# Patient Record
Sex: FEMALE | Race: WHITE | NOT HISPANIC OR LATINO | Employment: OTHER | URBAN - METROPOLITAN AREA
[De-identification: names, ages, dates, MRNs, and addresses within clinical notes are randomized per-mention and may not be internally consistent; named-entity substitution may affect disease eponyms.]

---

## 2017-01-18 ENCOUNTER — ALLSCRIPTS OFFICE VISIT (OUTPATIENT)
Dept: OTHER | Facility: OTHER | Age: 52
End: 2017-01-18

## 2017-04-19 ENCOUNTER — ALLSCRIPTS OFFICE VISIT (OUTPATIENT)
Dept: OTHER | Facility: OTHER | Age: 52
End: 2017-04-19

## 2017-07-21 ENCOUNTER — ALLSCRIPTS OFFICE VISIT (OUTPATIENT)
Dept: OTHER | Facility: OTHER | Age: 52
End: 2017-07-21

## 2017-10-23 ENCOUNTER — GENERIC CONVERSION - ENCOUNTER (OUTPATIENT)
Dept: OTHER | Facility: OTHER | Age: 52
End: 2017-10-23
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
## 2018-01-13 VITALS — DIASTOLIC BLOOD PRESSURE: 85 MMHG | TEMPERATURE: 98.3 F | SYSTOLIC BLOOD PRESSURE: 125 MMHG

## 2018-01-13 VITALS — SYSTOLIC BLOOD PRESSURE: 122 MMHG | TEMPERATURE: 98.1 F | DIASTOLIC BLOOD PRESSURE: 82 MMHG

## 2018-01-15 VITALS — DIASTOLIC BLOOD PRESSURE: 88 MMHG | SYSTOLIC BLOOD PRESSURE: 140 MMHG | TEMPERATURE: 97.8 F

## 2018-01-15 NOTE — MISCELLANEOUS
Provider Comments  Provider Comments:   Patient phone numbers on file have been disconnected, patient missed follow up appointment  I will send out a Missed Appointment warning letter to patients home    Hailey Concepcion [facility]      Signatures   Electronically signed by : Kathryn Carrera [facility]; Jul 19 2016  6:14PM EST                       (Author)    Electronically signed by : Vinod Pleitez MD; Jul 20 2016  5:59AM EST                       (Author)

## 2018-01-22 VITALS — TEMPERATURE: 97.9 F | SYSTOLIC BLOOD PRESSURE: 118 MMHG | DIASTOLIC BLOOD PRESSURE: 84 MMHG

## 2018-01-24 DIAGNOSIS — G43.709 CHRONIC MIGRAINE WITHOUT AURA WITHOUT STATUS MIGRAINOSUS, NOT INTRACTABLE: Primary | ICD-10-CM

## 2018-01-24 RX ORDER — GABAPENTIN 300 MG/1
CAPSULE ORAL
Qty: 540 CAPSULE | Refills: 0 | Status: SHIPPED | OUTPATIENT
Start: 2018-01-24 | End: 2018-03-03 | Stop reason: SDUPTHER

## 2018-01-26 ENCOUNTER — PROCEDURE VISIT (OUTPATIENT)
Dept: NEUROLOGY | Facility: CLINIC | Age: 53
End: 2018-01-26
Payer: COMMERCIAL

## 2018-01-26 VITALS — HEART RATE: 90 BPM | DIASTOLIC BLOOD PRESSURE: 85 MMHG | SYSTOLIC BLOOD PRESSURE: 135 MMHG | TEMPERATURE: 97.8 F

## 2018-01-26 DIAGNOSIS — G43.709 CHRONIC MIGRAINE WITHOUT AURA WITHOUT STATUS MIGRAINOSUS, NOT INTRACTABLE: Primary | ICD-10-CM

## 2018-01-26 PROCEDURE — 64615 CHEMODENERV MUSC MIGRAINE: CPT | Performed by: PSYCHIATRY & NEUROLOGY

## 2018-01-26 NOTE — PROGRESS NOTES
Chemodenervation  Date/Time: 1/26/2018 8:45 AM  Performed by: Flip Mitchell by: Armando Vanegas details:     Prepped With: Alcohol    Anesthesia (see MAR for exact dosages): Anesthesia method:  None  Procedure details:     Positon:  Upright  Botox:     Botox Type:  Type A    Brand:  Botox    Final Concentration per CC:  200 units    Needle Gauge:  30 G 2 5 inch    Medication Administration:  100 Units onabotulinumtoxin A 100 units  Procedures:     Botox Procedures: chronic headache      Indications: migraines    Injection Location:     Head / Face:  L superior cervical paraspinal, R superior cervical paraspinal, L , R , L frontalis, R frontalis, L medial occipitalis, R medial occipitalis, procerus, R temporalis, L temporalis, R superior trapezius, L superior trapezius, R orbicularis oculi and L orbicularis oculi    L  injection amount:  5    R  injection amount:  5    L lateral frontalis:  5    R lateral frontalis:  5    L medial frontalis:  5    R medial frontalis:  5    L temporalis injection amount:  20    R temporalis injection amount:  20    Procerus injection amount:  5    L orbicularis oculi injection amount:  5    R orbicularis oculi injection amount:  5    L medial occipitalis injection amount:  15    R medial occipitalis injection amount:  15    L superior cervical paraspinal injection amount:  10    R superior cervical paraspinal injection amount:  10    L superior trapezius injection amount:  15    R superior trapezius injection amount:  15  Total Units:     Total units used:  200, was medically necessary    Total units discarded:  0  Post-procedure details:     Chemodenervation:  Chronic migraine    Patient tolerance of procedure: Tolerated well, no immediate complications  Comments:      35 was given on the left temporal, parietal region

## 2018-03-03 DIAGNOSIS — G43.709 CHRONIC MIGRAINE WITHOUT AURA WITHOUT STATUS MIGRAINOSUS, NOT INTRACTABLE: ICD-10-CM

## 2018-03-05 RX ORDER — GABAPENTIN 300 MG/1
CAPSULE ORAL
Qty: 540 CAPSULE | Refills: 0 | Status: SHIPPED | OUTPATIENT
Start: 2018-03-05 | End: 2018-07-18 | Stop reason: SDUPTHER

## 2018-04-24 ENCOUNTER — TELEPHONE (OUTPATIENT)
Dept: NEUROLOGY | Facility: CLINIC | Age: 53
End: 2018-04-24

## 2018-04-25 NOTE — TELEPHONE ENCOUNTER
Per America, pts 200 units of Botox is going to arrive in [facility]  This Fri the 27th via [employer], Can you please await shipment and document when it arrives and please let me know when it comes to the office  Thank you so much!   Justin

## 2018-04-27 ENCOUNTER — TELEPHONE (OUTPATIENT)
Dept: NEUROLOGY | Facility: CLINIC | Age: 53
End: 2018-04-27

## 2018-04-27 NOTE — TELEPHONE ENCOUNTER
Juan,  Pt has new insur  And did not tell us    I found out from [place], pts Botox was supposed to be delivered today    Pts new insur  Is just reg  [place] now  ID number [id number]     Phone number   [facility] pt we have to cancel her binj apt with Q40 for this coming Monday  I told her we would work on this ASAP as she is suffering from worsening H/a's   Please advise ASAP    Thanks  Justin

## 2018-04-30 DIAGNOSIS — G43.709 CHRONIC MIGRAINE WITHOUT AURA WITHOUT STATUS MIGRAINOSUS, NOT INTRACTABLE: Primary | ICD-10-CM

## 2018-04-30 RX ORDER — DEXAMETHASONE 2 MG/1
TABLET ORAL
Qty: 3 TABLET | Refills: 0 | Status: SHIPPED | OUTPATIENT
Start: 2018-04-30 | End: 2018-09-21

## 2018-04-30 NOTE — TELEPHONE ENCOUNTER
She may have decadron 2mg in am for 3 days and increase her gabapentin to 2 tabs tid to see if that works better for her   thx

## 2018-04-30 NOTE — TELEPHONE ENCOUNTER
Pt seems very unsure of what she has taken in the past & what has helped    Pt states she has tried fioricet in the past w/minimal relief  Pt thinks she has tried steroids     Per med rec, pt has tried sumatriptan, midrin, decadron, toradol ink (she did not like the inj), & reglan    Pt does not want codeine, she OD'd in the past    Pt has only been taking gabapentin 300mg, 2 tabs BID, she will increase that as directed per last rx now    Please advise

## 2018-05-01 ENCOUNTER — TELEPHONE (OUTPATIENT)
Dept: NEUROLOGY | Facility: CLINIC | Age: 53
End: 2018-05-01

## 2018-05-02 ENCOUNTER — DOCUMENTATION (OUTPATIENT)
Dept: NEUROLOGY | Facility: CLINIC | Age: 53
End: 2018-05-02

## 2018-05-04 ENCOUNTER — TELEPHONE (OUTPATIENT)
Dept: NEUROLOGY | Facility: CLINIC | Age: 53
End: 2018-05-04

## 2018-05-04 ENCOUNTER — TRANSCRIBE ORDERS (OUTPATIENT)
Dept: NEUROLOGY | Facility: CLINIC | Age: 53
End: 2018-05-04

## 2018-05-04 DIAGNOSIS — IMO0002 CHRONIC MIGRAINE: Primary | ICD-10-CM

## 2018-05-04 RX ORDER — DULOXETIN HYDROCHLORIDE 60 MG/1
60 CAPSULE, DELAYED RELEASE ORAL DAILY
Refills: 0 | COMMUNITY
Start: 2018-04-03 | End: 2020-12-14

## 2018-05-04 RX ORDER — AMLODIPINE BESYLATE 5 MG/1
TABLET ORAL
Refills: 3 | COMMUNITY
Start: 2018-04-03 | End: 2019-04-24 | Stop reason: ALTCHOICE

## 2018-05-04 RX ORDER — FLUTICASONE FUROATE AND VILANTEROL TRIFENATATE 100; 25 UG/1; UG/1
POWDER RESPIRATORY (INHALATION)
Refills: 5 | Status: ON HOLD | COMMUNITY
Start: 2018-02-09 | End: 2022-01-28 | Stop reason: ALTCHOICE

## 2018-05-04 RX ORDER — ONABOTULINUMTOXINA 200 [USP'U]/1
INJECTION, POWDER, LYOPHILIZED, FOR SOLUTION INTRADERMAL; INTRAMUSCULAR
COMMUNITY
Start: 2018-05-01 | End: 2019-04-24 | Stop reason: ALTCHOICE

## 2018-05-04 NOTE — PROGRESS NOTES
Chemodenervation  Date/Time: 5/7/2018 2:43 PM  Performed by: Jazmine Gandhi  Authorized by: Mariya Pringle details:     Prepped With: Alcohol    Anesthesia (see MAR for exact dosages): Anesthesia method:  None  Procedure details:     Position:  Upright  Botox:     Botox Type:  Type A    Brand:  Botox    mL's of Botulinum Toxin:  200    Final Concentration per CC:  200 units    Needle Gauge:  30 G 2 5 inch  Procedures:     Botox Procedures: chronic headache      Indications: migraines    Injection Location:     Head / Face:  L superior cervical paraspinal, R superior cervical paraspinal, L , R , L frontalis, R frontalis, L medial occipitalis, R medial occipitalis, procerus, R temporalis, L temporalis, R superior trapezius and L superior trapezius    L  injection amount:  5 unit(s)    R  injection amount:  5 unit(s)    L lateral frontalis:  5 unit(s)    R lateral frontalis:  5 unit(s)    L medial frontalis:  5 unit(s)    R medial frontalis:  5 unit(s)    L temporalis injection amount:  20 unit(s)    R temporalis injection amount:  20 unit(s)    Procerus injection amount:  5 unit(s)    L medial occipitalis injection amount:  15 unit(s)    R medial occipitalis injection amount:  15 unit(s)    L superior cervical paraspinal injection amount:  10 unit(s)    R superior cervical paraspinal injection amount:  10 unit(s)    L superior trapezius injection amount:  15 unit(s)    R superior trapezius injection amount:  15 unit(s)  Total Units:     Total units used:  200    Total units discarded:  0  Post-procedure details:     Chemodenervation:  Chronic migraine    Patient tolerance of procedure:   Tolerated well, no immediate complications  Comments:      45 units given on the left temporal, parietal region, all medically necessary

## 2018-05-07 ENCOUNTER — PROCEDURE VISIT (OUTPATIENT)
Dept: NEUROLOGY | Facility: CLINIC | Age: 53
End: 2018-05-07
Payer: COMMERCIAL

## 2018-05-07 VITALS — DIASTOLIC BLOOD PRESSURE: 82 MMHG | HEART RATE: 99 BPM | SYSTOLIC BLOOD PRESSURE: 130 MMHG | TEMPERATURE: 98.4 F

## 2018-05-07 DIAGNOSIS — IMO0002 CHRONIC MIGRAINE: ICD-10-CM

## 2018-05-07 DIAGNOSIS — G43.709 CHRONIC MIGRAINE WITHOUT AURA WITHOUT STATUS MIGRAINOSUS, NOT INTRACTABLE: Primary | ICD-10-CM

## 2018-05-07 PROCEDURE — 64615 CHEMODENERV MUSC MIGRAINE: CPT | Performed by: PHYSICIAN ASSISTANT

## 2018-06-25 ENCOUNTER — TELEPHONE (OUTPATIENT)
Dept: NEUROLOGY | Facility: CLINIC | Age: 53
End: 2018-06-25

## 2018-06-25 DIAGNOSIS — G43.709 CHRONIC MIGRAINE WITHOUT AURA WITHOUT STATUS MIGRAINOSUS, NOT INTRACTABLE: Primary | ICD-10-CM

## 2018-06-25 RX ORDER — DEXAMETHASONE 2 MG/1
2 TABLET ORAL
Qty: 5 TABLET | Refills: 0 | Status: SHIPPED | OUTPATIENT
Start: 2018-06-25 | End: 2018-09-21 | Stop reason: SDUPTHER

## 2018-06-25 NOTE — TELEPHONE ENCOUNTER
pt called and states today is day 5 of a migriane   +nausea, +light/sound and smell sensitivity  tried tylenol and ineffective     gabapentin 300mg 2 caps tid  unsure what she has taken in the past that has worked  will not take depakote because of weight gain    she does not remember if she took decadron in the past     [phone]

## 2018-07-11 ENCOUNTER — TELEPHONE (OUTPATIENT)
Dept: NEUROLOGY | Facility: CLINIC | Age: 53
End: 2018-07-11

## 2018-07-11 NOTE — TELEPHONE ENCOUNTER
Called patient because [place] is no longer active  Patient stated she now has Medicare and Access (yellow card) I requested patient bring copies of cards to the office  Patient agreeable to go to our [facility] office since she lives in [place]

## 2018-07-17 NOTE — TELEPHONE ENCOUNTER
Delia Tierney  [address] [place] [address],     Patient did no show up at the [facility] with new insurance cards  [place] is still scanned into the patient's chart from May

## 2018-07-18 DIAGNOSIS — G43.709 CHRONIC MIGRAINE WITHOUT AURA WITHOUT STATUS MIGRAINOSUS, NOT INTRACTABLE: ICD-10-CM

## 2018-07-18 RX ORDER — GABAPENTIN 300 MG/1
CAPSULE ORAL
Qty: 540 CAPSULE | Refills: 0 | Status: SHIPPED | OUTPATIENT
Start: 2018-07-18 | End: 2018-10-11 | Stop reason: SDUPTHER

## 2018-07-31 NOTE — TELEPHONE ENCOUNTER
Called and spoke with patient regarding below  Patient stated she is having difficulty getting to office to provide copies  Patient stated she will be able to fax copies tomorrow 8/1/18  Provided patient with fax #  I will await

## 2018-08-06 NOTE — TELEPHONE ENCOUNTER
Called and spoke with patient who stated she was not yet able to fax insurance information  We discussed that we will have to cancel botox inj on 8/9/18  Tried to r/s patient to next available which is 8/30/18, patient was upset stating that she is suffering and does not want to wait 3 weeks  I explained to patient that I called her multiple times to get insurance information and she has not followed through with the requests  Patient requesting to be seen at [facility] [place]  I will reach out to [address] at [facility] office to schedule patient once we receive patients information

## 2018-08-24 ENCOUNTER — TELEPHONE (OUTPATIENT)
Dept: NEUROLOGY | Facility: CLINIC | Age: 53
End: 2018-08-24

## 2018-08-24 DIAGNOSIS — G43.709 CHRONIC MIGRAINE WITHOUT AURA WITHOUT STATUS MIGRAINOSUS, NOT INTRACTABLE: Primary | ICD-10-CM

## 2018-08-24 DIAGNOSIS — G43.709 CHRONIC MIGRAINE WITHOUT AURA WITHOUT STATUS MIGRAINOSUS, NOT INTRACTABLE: ICD-10-CM

## 2018-08-24 RX ORDER — DIVALPROEX SODIUM 250 MG/1
TABLET, DELAYED RELEASE ORAL
Qty: 13 TABLET | Refills: 0 | Status: SHIPPED | OUTPATIENT
Start: 2018-08-24 | End: 2019-04-24 | Stop reason: ALTCHOICE

## 2018-08-24 NOTE — TELEPHONE ENCOUNTER
Patient made aware that rx sent to pharm for depakote  She stated she doesn't want to take it due to the weight gain, but informed her that it's only for 5 days and not long term, patient agreeable  Patient also advised to sched f/u at [place] office  Patient stated she will once we receive her insurance info

## 2018-08-24 NOTE — TELEPHONE ENCOUNTER
----- Message from Aryan Jones sent at 8/22/2018 12:28 PM EDT -----  [facility],    Please reach out to patient  I have attempted to get copies of patients insurance cards in order to get new auth  Patient has been non compliant with this  She also expressed wanting to only go to [facility] since she lives in [place], Dr Rhianna Early is ok with this       Thanks,    Jahaira Amezcua

## 2018-08-24 NOTE — TELEPHONE ENCOUNTER
Per Jessica Mccann at [place] office, Patient is trying to sched a botox appt at [place] office but they are having issues with obtaining her insurance for new authorizations, they will not sched her until they receive her insurance info faxed to [place]

## 2018-08-24 NOTE — TELEPHONE ENCOUNTER
It does not seem that [facility] team has done a follow up with her  She was seen by Dr Maureen Stark in the past   She has no office visit follow up since 2016  She needs to make an appointment with the [facility] team to update what is going on with her  Consider Depakote 250 mg 3 tabs at bedtime for 3 days then 20 50 mg at bedtime for 2 days then stop

## 2018-08-24 NOTE — TELEPHONE ENCOUNTER
When did migraine start?  3 days ago  Location/Description: throbbing pain, unilateral in the left temporal area  Pain scale: 8; pain was 10/10 this morn  Associated symptoms:nausea, vomiting, photophobia, decreased social functioning  Precipitating factors: stress  Alleviating factors: caffeine  What medications have you tried for this migraine headache? gabapentin and OTC aleve 220 mg 2 tabs 4 times yesterday (total of 8 pills)  Advised patient not to take any more OTC aleve    Current migraine medications are confirmed as:  Gabapentin 300 mg 2 caps BID    Medications tried in the past? Decadron "numbs" it

## 2018-08-24 NOTE — TELEPHONE ENCOUNTER
Called and spoke with the patient- advised her that I cannot schedule her Botox appointment until she send over her insurance cards so we can get authorization  Patient states she sent them over today- called spoke with Kaylene Whyte- she didn't see any come over- went to check with Juan but he was away from his desk  I advised patient we have not received them yet  Patient states she will call her friend to make sure they were sent and went through okay  Patient states she is having horrible headaches  She questioned what she can do until she gets her Botox I did advise that I can send her to the nursing team  Patient was hesitant and then stated "well I guess I should because I've been popping gabapentin like its Candy and I don't want to have to call you on Monday to tell you I overdose " Called nursing team, and advised them of the situation  Patient was transferred directly to hospitals

## 2018-08-24 NOTE — TELEPHONE ENCOUNTER
[address] [facility]             Patient called again to schedule Botox in [facility]  She is having insurance cards faxed today or tomorrow  She will be available after 1 pm today 8/24/18  Please call patient ASAP to schedule

## 2018-08-25 DIAGNOSIS — G43.709 CHRONIC MIGRAINE WITHOUT AURA WITHOUT STATUS MIGRAINOSUS, NOT INTRACTABLE: ICD-10-CM

## 2018-08-27 ENCOUNTER — DOCUMENTATION (OUTPATIENT)
Dept: NEUROLOGY | Facility: CLINIC | Age: 53
End: 2018-08-27

## 2018-08-27 RX ORDER — DIVALPROEX SODIUM 250 MG/1
TABLET, DELAYED RELEASE ORAL
Qty: 390 TABLET | Refills: 0 | OUTPATIENT
Start: 2018-08-27

## 2018-08-27 RX ORDER — DIVALPROEX SODIUM 250 MG/1
TABLET, DELAYED RELEASE ORAL
Qty: 13 TABLET | Refills: 0 | OUTPATIENT
Start: 2018-08-27

## 2018-08-27 NOTE — TELEPHONE ENCOUNTER
Insurance cards received per Juan  Called and spoke with the patient made her aware that we received her cards  I offered the patient an appointment on 9/7/18 at 8 am with [address]  Patient states she cannot do Tuesday or Fridays  Due to MK's availability and the upcoming holiday next available with [address] is 9/12/18 at 730 am  Patient accepted that appointment

## 2018-09-12 ENCOUNTER — PROCEDURE VISIT (OUTPATIENT)
Dept: NEUROLOGY | Facility: CLINIC | Age: 53
End: 2018-09-12
Payer: COMMERCIAL

## 2018-09-12 VITALS
RESPIRATION RATE: 16 BRPM | BODY MASS INDEX: 31.83 KG/M2 | HEIGHT: 62 IN | HEART RATE: 92 BPM | SYSTOLIC BLOOD PRESSURE: 136 MMHG | WEIGHT: 173 LBS | DIASTOLIC BLOOD PRESSURE: 80 MMHG | TEMPERATURE: 98.1 F

## 2018-09-12 DIAGNOSIS — G43.709 CHRONIC MIGRAINE WITHOUT AURA WITHOUT STATUS MIGRAINOSUS, NOT INTRACTABLE: Primary | ICD-10-CM

## 2018-09-12 PROCEDURE — 64615 CHEMODENERV MUSC MIGRAINE: CPT | Performed by: PHYSICIAN ASSISTANT

## 2018-09-12 RX ORDER — ALBUTEROL SULFATE 90 UG/1
POWDER, METERED RESPIRATORY (INHALATION)
Refills: 1 | COMMUNITY
Start: 2018-06-14 | End: 2020-05-04 | Stop reason: ALTCHOICE

## 2018-09-12 RX ORDER — AMLODIPINE BESYLATE 10 MG/1
TABLET ORAL
Refills: 3 | COMMUNITY
Start: 2018-06-11 | End: 2019-04-24 | Stop reason: ALTCHOICE

## 2018-09-12 RX ORDER — LIDOCAINE AND PRILOCAINE 25; 25 MG/G; MG/G
CREAM TOPICAL AS NEEDED
Qty: 30 G | Refills: 0 | Status: SHIPPED | OUTPATIENT
Start: 2018-09-12 | End: 2019-04-24 | Stop reason: ALTCHOICE

## 2018-09-12 RX ORDER — SUMATRIPTAN 100 MG/1
1 TABLET, FILM COATED ORAL
COMMUNITY
Start: 2015-12-31 | End: 2020-05-04 | Stop reason: ALTCHOICE

## 2018-09-12 NOTE — PROGRESS NOTES
Chemodenervation  Date/Time: 9/12/2018 7:50 AM  Performed by: Laure Carey  Authorized by: Laure Carey     Pre-procedure details:     Prepped With: Alcohol    Procedure details:     Position:  Upright  Botox:     Botox Type:  Type A    Brand:  Botox    mL's of Botulinum Toxin:  165    Final Concentration per CC:  50 units    Needle Gauge:  30 G 2 5 inch  Procedures:     Botox Procedures: chronic headache      Indications: migraines    Injection Location:     Head / Face:  L , R , L frontalis, R frontalis, R inferior cervical paraspinal, L inferior cervical paraspinal, L medial occipitalis, R medial occipitalis, L lateral occipitalis, R lateral occipitalis, procerus, L temporalis, R temporalis, R superior trapezius and L superior trapezius    L  injection amount:  5 unit(s)    R  injection amount:  5 unit(s)    L lateral frontalis:  5 unit(s)    R lateral frontalis:  5 unit(s)    L medial frontalis:  5 unit(s)    R medial frontalis:  5 unit(s)    L temporalis injection amount:  20 unit(s)    R temporalis injection amount:  20 unit(s)    Procerus injection amount:  5 unit(s)    L lateral occipitalis injection amount:  5 unit(s)    R lateral occipitalis injection amount:  5 unit(s)    L medial occipitalis injection amount:  10 unit(s)    R medial occipitalis injection amount:  10 unit(s)    L inferior cervical paraspinal injection amount:  10 unit(s)    R inferior cervical paraspinal injection amount:  10 unit(s)    L superior trapezius injection amount:  15 unit(s)    R superior trapezius injection amount:  15 unit(s)  Total Units:     Total units used:  165    Total units discarded:  35  Post-procedure details:     Chemodenervation:  Chronic migraine    Facial Nerve Location[de-identified]  Bilateral facial nerve    Patient tolerance of procedure: Tolerated well, no immediate complications  Comments:      10 extra units in the left scalp medically necessary            Vitals: 09/12/18 0746   BP: 136/80   Pulse: 92   Resp: 16   Temp: 98 1 °F (36 7 °C)       Emla cream provided for next visit  The patient's sister recently passed from ruptured aneurysm  The patient had imaging at [facility] and will have this sent over to us  When she gets her license back in November she will make a follow-up to discuss/manage this

## 2018-09-20 ENCOUNTER — TELEPHONE (OUTPATIENT)
Dept: NEUROLOGY | Facility: CLINIC | Age: 53
End: 2018-09-20

## 2018-09-20 DIAGNOSIS — G43.709 CHRONIC MIGRAINE WITHOUT AURA WITHOUT STATUS MIGRAINOSUS, NOT INTRACTABLE: ICD-10-CM

## 2018-09-20 NOTE — TELEPHONE ENCOUNTER
pt called and states that she had botox on 9/12  today is day 3 of a headache   tired of crying herself to sleep and waking up with the same pain  10/10   today is the worst   +light/smell sensitivity  no n/v   took gabapentin this am and not helping   has not taken any other medications  gabapentin 300mg 2 tabs bid  does not want depakote due to weight gain  took decadron in june but does not remember if this was effective  agreeable to decadron if that is what you would like to order      [phone]

## 2018-09-21 ENCOUNTER — TELEPHONE (OUTPATIENT)
Dept: NEUROLOGY | Facility: CLINIC | Age: 53
End: 2018-09-21

## 2018-09-21 RX ORDER — DEXAMETHASONE 2 MG/1
2 TABLET ORAL
Qty: 5 TABLET | Refills: 0 | Status: SHIPPED | OUTPATIENT
Start: 2018-09-21 | End: 2018-12-17 | Stop reason: SDUPTHER

## 2018-09-21 NOTE — TELEPHONE ENCOUNTER
Will send decadron  Last message could have been handled by any provider if this was emergent  Jacquelin's last message was very helpful and detailed

## 2018-10-11 DIAGNOSIS — G43.709 CHRONIC MIGRAINE WITHOUT AURA WITHOUT STATUS MIGRAINOSUS, NOT INTRACTABLE: ICD-10-CM

## 2018-10-11 RX ORDER — GABAPENTIN 300 MG/1
600 CAPSULE ORAL 3 TIMES DAILY
Qty: 540 CAPSULE | Refills: 0 | Status: SHIPPED | OUTPATIENT
Start: 2018-10-11 | End: 2019-04-19 | Stop reason: SDUPTHER

## 2018-11-27 ENCOUNTER — DOCUMENTATION (OUTPATIENT)
Dept: NEUROLOGY | Facility: CLINIC | Age: 53
End: 2018-11-27

## 2018-12-06 ENCOUNTER — TELEPHONE (OUTPATIENT)
Dept: NEUROLOGY | Facility: CLINIC | Age: 53
End: 2018-12-06

## 2018-12-06 NOTE — TELEPHONE ENCOUNTER
Pt states that she still had not receive a call from our office re: botox delivery  Her appt is scheduled 12/13/18  Call transferred to [facility]

## 2018-12-06 NOTE — TELEPHONE ENCOUNTER
Spoke with the patient- made her aware that she is stock Botox  Confirmed Botox appointment with the patient

## 2018-12-13 ENCOUNTER — PROCEDURE VISIT (OUTPATIENT)
Dept: NEUROLOGY | Facility: CLINIC | Age: 53
End: 2018-12-13
Payer: MEDICARE

## 2018-12-13 VITALS — DIASTOLIC BLOOD PRESSURE: 78 MMHG | HEART RATE: 102 BPM | SYSTOLIC BLOOD PRESSURE: 116 MMHG | TEMPERATURE: 98.2 F

## 2018-12-13 DIAGNOSIS — G43.709 CHRONIC MIGRAINE WITHOUT AURA WITHOUT STATUS MIGRAINOSUS, NOT INTRACTABLE: Primary | ICD-10-CM

## 2018-12-13 PROCEDURE — 64615 CHEMODENERV MUSC MIGRAINE: CPT | Performed by: PHYSICIAN ASSISTANT

## 2018-12-13 NOTE — PROGRESS NOTES
Chemodenervation  Date/Time: 12/13/2018 8:23 AM  Performed by: Becki Diop  Authorized by: Becki Diop     Pre-procedure details:     Prepped With: Alcohol    Procedure details:     Position:  Upright  Botox:     Botox Type:  Type A    Brand:  Botox    mL's of Botulinum Toxin:  175    Final Concentration per CC:  50 units    Needle Gauge:  30 G 2 5 inch  Procedures:     Botox Procedures: chronic headache      Indications: migraines    Injection Location:     Head / Face:  L , R , L frontalis, R frontalis, R inferior cervical paraspinal, L inferior cervical paraspinal, L medial occipitalis, R medial occipitalis, L lateral occipitalis, R lateral occipitalis, procerus, L temporalis, R temporalis, R superior trapezius and L superior trapezius    L  injection amount:  5 unit(s)    R  injection amount:  5 unit(s)    L lateral frontalis:  5 unit(s)    R lateral frontalis:  5 unit(s)    L medial frontalis:  5 unit(s)    R medial frontalis:  5 unit(s)    L temporalis injection amount:  20 unit(s)    R temporalis injection amount:  20 unit(s)    Procerus injection amount:  5 unit(s)    L lateral occipitalis injection amount:  5 unit(s)    R lateral occipitalis injection amount:  5 unit(s)    L medial occipitalis injection amount:  10 unit(s)    R medial occipitalis injection amount:  10 unit(s)    L inferior cervical paraspinal injection amount:  10 unit(s)    R inferior cervical paraspinal injection amount:  10 unit(s)    L superior trapezius injection amount:  15 unit(s)    R superior trapezius injection amount:  15 unit(s)  Total Units:     Total units used:  175    Total units discarded:  25  Post-procedure details:     Chemodenervation:  Chronic migraine    Facial Nerve Location[de-identified]  Bilateral facial nerve    Patient tolerance of procedure: Tolerated well, no immediate complications  Comments:      20 extra units in the left scalp, medically necessary            Vitals: 12/13/18 0752   BP: 116/78   Pulse: 102   Temp: 98 2 °F (36 8 °C)

## 2018-12-17 ENCOUNTER — TELEPHONE (OUTPATIENT)
Dept: NEUROLOGY | Facility: CLINIC | Age: 53
End: 2018-12-17

## 2018-12-17 DIAGNOSIS — G43.709 CHRONIC MIGRAINE WITHOUT AURA WITHOUT STATUS MIGRAINOSUS, NOT INTRACTABLE: ICD-10-CM

## 2018-12-17 RX ORDER — DEXAMETHASONE 2 MG/1
TABLET ORAL
Qty: 5 TABLET | Refills: 0 | Status: SHIPPED | OUTPATIENT
Start: 2018-12-17 | End: 2019-01-17 | Stop reason: SDUPTHER

## 2018-12-17 NOTE — TELEPHONE ENCOUNTER
Patient Nils Velasquez called [facility] office  States she had Botox with Marilyn Pack this past Thursday 12/13/18 and has a headache since Friday 12/14/18  Consulted with Marilyn Pack who offered decadron, sumatriptan, or coming into the office for a toradol injection  The patient declined the toradol injection and requested a prescription for "whatever Marilyn Pack recommends" to be sent to [address]

## 2019-01-16 ENCOUNTER — TELEPHONE (OUTPATIENT)
Dept: NEUROLOGY | Facility: CLINIC | Age: 54
End: 2019-01-16

## 2019-01-16 DIAGNOSIS — G43.709 CHRONIC MIGRAINE WITHOUT AURA WITHOUT STATUS MIGRAINOSUS, NOT INTRACTABLE: ICD-10-CM

## 2019-01-16 NOTE — TELEPHONE ENCOUNTER
I called patient in regards to scheduled Botox appt with [address] on 3/14/19  Per Jackie's previous note, patient requested to continue her care with Suni Ruiz and Eric  A botox appt was offered with Eric on 3/18/19 in [place] d/t patient unsure of where the [place] [address] office is located  Patient denied appt because she "doesn't want to prolong her botox"  States any appointments thereafter will be made with Alberto Muñoz  Please advice that patient is awaiting a call back to discuss meds

## 2019-01-16 NOTE — TELEPHONE ENCOUNTER
Pt c/o migraine x7days  N/V yesterday  Photosensitive yesterday  sharp throbbing @l/temple, not behind eye  Not dizzy  Yesterday was the worst day but she can't get rid of pain today    Current meds  Gabapentin 300mg, 2cap tid    Pt does not want steroids, pt getting ready for gastric bypass  depakote not helpful in the past    Please advise  Pt requesting appt w/Dr Andrea Adam f/u scheduled 4/24  Pt aware that she will continue her care w/Dr Austen Wilson  Pt is scheduled for botox 3/14 w/[facility], I did speak w/Siobhan    Please advise

## 2019-01-16 NOTE — TELEPHONE ENCOUNTER
She specifically requested follow up with [address] due to proximity [facility]  She needs to continue to receive her care there  We cannot have patients go back and forth  Therefore, will need to remain with [address]  Final option is that she could see Dr Adriel Marvin at [facility] office, not Dr Airam Miller or Alva Palacios  Please cancel appointment with Dr Airam Miller on 4/24/19    Thank you

## 2019-01-17 DIAGNOSIS — G43.709 CHRONIC MIGRAINE WITHOUT AURA WITHOUT STATUS MIGRAINOSUS, NOT INTRACTABLE: Primary | ICD-10-CM

## 2019-01-17 DIAGNOSIS — G43.709 CHRONIC MIGRAINE WITHOUT AURA WITHOUT STATUS MIGRAINOSUS, NOT INTRACTABLE: ICD-10-CM

## 2019-01-17 RX ORDER — OLANZAPINE 5 MG/1
5 TABLET ORAL
Qty: 5 TABLET | Refills: 0 | Status: SHIPPED | OUTPATIENT
Start: 2019-01-17 | End: 2019-04-24 | Stop reason: SDUPTHER

## 2019-01-17 RX ORDER — DEXAMETHASONE 2 MG/1
TABLET ORAL
Qty: 5 TABLET | Refills: 0 | Status: SHIPPED | OUTPATIENT
Start: 2019-01-17 | End: 2019-04-24 | Stop reason: ALTCHOICE

## 2019-01-17 RX ORDER — OLANZAPINE 5 MG/1
TABLET ORAL
Qty: 90 TABLET | Refills: 0 | OUTPATIENT
Start: 2019-01-17

## 2019-01-17 NOTE — TELEPHONE ENCOUNTER
Called patient and informed her that moving forward she should continue to be seen by Dr Liz Barnhart and Troy Jones  Patient states she loves Dr Char Melgar and does not want to switch her neurologist   She states she was under the impression that she was only seeing Troy Jones for botox  Patient states she would be happy to see Hoang Lowery and Dr Char Melgar in the [place] or [place] offices moving forward  Patient states she would prefer to keep last scheduled Botox appt with Troy Jones as she does not want to delay getting the Botox further  Please advise if okay to keep patient current appts as scheduled and advise re below migraine (or let us know if you would like it sent to Troy Jones)  Patient states she is suffering from the migraine and really hopes she will hear back today

## 2019-01-17 NOTE — TELEPHONE ENCOUNTER
Pt agreeable to olanzapine, will call on Monday w/update  Pt does not want steroids, please see prevous note, is having gastric bypass done    For clarification  Pt will be getting next botox w/[facility] & will then continue her care here w/Lisa & Dr Parminder Zuñiga    thanks

## 2019-01-17 NOTE — TELEPHONE ENCOUNTER
Discussed with Dr Valerio King and he states pt can continue botox with [facility] and see Dr Parminder Zuñiga as her neurologist as long as treatment plan is consistent, as this is the patients preference       Either [facility] or Moiz Bacon, please respond on treating pt's current headache ASAP

## 2019-01-17 NOTE — TELEPHONE ENCOUNTER
Patient states she would like to avoid taking steroids at this point if possible so she will take the Olanzapine  Patient states she will keep current appts as scheduled

## 2019-01-17 NOTE — TELEPHONE ENCOUNTER
She needs to decide who she wants care from: Steff Garcia and Dr Og[place] Doctor and Dr Parminder Zuñiga  She cannot go back and forth between the 2 centers  She can chose to switch her botox and office visits to [place] or continue at [facility]  Unfortunately, she must make a choice  Steff Garcia, please advise on how to break patient's headache    Thanks

## 2019-01-17 NOTE — TELEPHONE ENCOUNTER
I called the patient and left a voicemail  I will send Decadron because this seemed to help in the past   I told her to call back if she needs something else

## 2019-03-06 ENCOUNTER — DOCUMENTATION (OUTPATIENT)
Dept: NEUROLOGY | Facility: CLINIC | Age: 54
End: 2019-03-06

## 2019-03-06 NOTE — PROGRESS NOTES
General 03/06/2019 10:51 AM Cailin King care coordination  -   Note    Botox- no authorization needed  Please use our stock         Thank you!

## 2019-03-06 NOTE — PROGRESS NOTES
Patient scheduled with Wily on 3/21/19 [facility] [place] [facility]

## 2019-03-21 ENCOUNTER — PROCEDURE VISIT (OUTPATIENT)
Dept: NEUROLOGY | Facility: CLINIC | Age: 54
End: 2019-03-21
Payer: MEDICARE

## 2019-03-21 ENCOUNTER — TELEPHONE (OUTPATIENT)
Dept: NEUROLOGY | Facility: CLINIC | Age: 54
End: 2019-03-21

## 2019-03-21 VITALS — DIASTOLIC BLOOD PRESSURE: 78 MMHG | SYSTOLIC BLOOD PRESSURE: 118 MMHG | TEMPERATURE: 98.1 F | HEART RATE: 91 BPM

## 2019-03-21 DIAGNOSIS — G43.709 CHRONIC MIGRAINE WITHOUT AURA WITHOUT STATUS MIGRAINOSUS, NOT INTRACTABLE: Primary | ICD-10-CM

## 2019-03-21 PROCEDURE — 64615 CHEMODENERV MUSC MIGRAINE: CPT | Performed by: PHYSICIAN ASSISTANT

## 2019-03-21 RX ORDER — KETOROLAC TROMETHAMINE 30 MG/ML
INJECTION, SOLUTION INTRAMUSCULAR; INTRAVENOUS
Qty: 2 ML | Refills: 0 | Status: SHIPPED | OUTPATIENT
Start: 2019-03-21 | End: 2019-04-24 | Stop reason: SDUPTHER

## 2019-03-21 RX ORDER — SYRINGE W-NEEDLE,DISPOSAB,3 ML 25GX5/8"
SYRINGE, EMPTY DISPOSABLE MISCELLANEOUS
Qty: 3 EACH | Refills: 2 | Status: SHIPPED | OUTPATIENT
Start: 2019-03-21 | End: 2019-12-02 | Stop reason: SDUPTHER

## 2019-03-21 NOTE — PROGRESS NOTES
Chemodenervation  Date/Time: 3/21/2019 8:01 AM  Performed by: Alejo Tony PA-C  Authorized by: Alejo Tony PA-C     Pre-procedure details:     Prepped With: Alcohol    Procedure details:     Position:  Upright  Botox:     Botox Type:  Type A    Brand:  Botox    mL's of Botulinum Toxin:  185    Final Concentration per CC:  50 units    Needle Gauge:  30 G 2 5 inch  Procedures:     Botox Procedures: chronic headache      Indications: migraines    Injection Location:     Head / Face:  L , R , L frontalis, R frontalis, R inferior cervical paraspinal, L inferior cervical paraspinal, L medial occipitalis, R medial occipitalis, L lateral occipitalis, R lateral occipitalis, procerus, L temporalis, R temporalis, R superior trapezius and L superior trapezius    L  injection amount:  5 unit(s)    R  injection amount:  5 unit(s)    L lateral frontalis:  5 unit(s)    R lateral frontalis:  5 unit(s)    L medial frontalis:  5 unit(s)    R medial frontalis:  5 unit(s)    L temporalis injection amount:  20 unit(s)    R temporalis injection amount:  20 unit(s)    Procerus injection amount:  5 unit(s)    L lateral occipitalis injection amount:  5 unit(s)    R lateral occipitalis injection amount:  5 unit(s)    L medial occipitalis injection amount:  10 unit(s)    R medial occipitalis injection amount:  10 unit(s)    L inferior cervical paraspinal injection amount:  10 unit(s)    R inferior cervical paraspinal injection amount:  10 unit(s)    L superior trapezius injection amount:  15 unit(s)    R superior trapezius injection amount:  15 unit(s)  Total Units:     Total units used:  185    Total units discarded:  15  Post-procedure details:     Chemodenervation:  Chronic migraine    Facial Nerve Location[de-identified]  Bilateral facial nerve    Patient tolerance of procedure:   Tolerated well, no immediate complications  Comments:      Extra 25 units in the left scalp/ parietal region and extra 5 units in the left anterior temporalis, medically necessary          Vitals:    03/21/19 0756   BP: 118/78   Pulse: 91   Temp: 98 1 °F (36 7 °C)       toradol IM injection PRN severe migraine only, JUST had GB surgery about 5 weeks ago [facility]   Ineffective per her: depakote taper, advil, tylenol, steroid, olanzapine, gabapentin rescue, imitrex, maxalt

## 2019-03-21 NOTE — TELEPHONE ENCOUNTER
Call received from [place] pharmacist stating they do not have 27g 3ml 1 1/4 syringes are not available to order  I gave verbal for 25g 3ml 1 1/2 syringes  Pharmacist also states that per , no latex in the stopper, however, they cannot guarantee that latex gloves did not "handle" the product  Call placed to patient to inquire further about latex allergy  She states she does not have any respiratory reaction only redness where touched  Patient still feels comfortable still picking up the medication

## 2019-04-19 DIAGNOSIS — G43.709 CHRONIC MIGRAINE WITHOUT AURA WITHOUT STATUS MIGRAINOSUS, NOT INTRACTABLE: ICD-10-CM

## 2019-04-22 RX ORDER — GABAPENTIN 300 MG/1
600 CAPSULE ORAL 3 TIMES DAILY
Qty: 540 CAPSULE | Refills: 0 | Status: SHIPPED | OUTPATIENT
Start: 2019-04-22 | End: 2019-07-19 | Stop reason: SDUPTHER

## 2019-04-24 ENCOUNTER — TELEPHONE (OUTPATIENT)
Dept: NEUROLOGY | Facility: CLINIC | Age: 54
End: 2019-04-24

## 2019-04-24 ENCOUNTER — OFFICE VISIT (OUTPATIENT)
Dept: NEUROLOGY | Facility: CLINIC | Age: 54
End: 2019-04-24
Payer: MEDICARE

## 2019-04-24 VITALS
SYSTOLIC BLOOD PRESSURE: 127 MMHG | BODY MASS INDEX: 31.8 KG/M2 | DIASTOLIC BLOOD PRESSURE: 85 MMHG | HEART RATE: 91 BPM | WEIGHT: 179.5 LBS | HEIGHT: 63 IN

## 2019-04-24 DIAGNOSIS — G60.9 IDIOPATHIC PERIPHERAL NEUROPATHY: ICD-10-CM

## 2019-04-24 DIAGNOSIS — E55.9 VITAMIN D DEFICIENCY: ICD-10-CM

## 2019-04-24 DIAGNOSIS — G43.709 CHRONIC MIGRAINE WITHOUT AURA WITHOUT STATUS MIGRAINOSUS, NOT INTRACTABLE: ICD-10-CM

## 2019-04-24 DIAGNOSIS — G43.709 CHRONIC MIGRAINE WITHOUT AURA WITHOUT STATUS MIGRAINOSUS, NOT INTRACTABLE: Primary | ICD-10-CM

## 2019-04-24 PROBLEM — G62.9 PERIPHERAL NEUROPATHY: Status: ACTIVE | Noted: 2019-04-24

## 2019-04-24 PROCEDURE — 99215 OFFICE O/P EST HI 40 MIN: CPT | Performed by: PSYCHIATRY & NEUROLOGY

## 2019-04-24 PROCEDURE — 96372 THER/PROPH/DIAG INJ SC/IM: CPT | Performed by: PSYCHIATRY & NEUROLOGY

## 2019-04-24 RX ORDER — KETOROLAC TROMETHAMINE 30 MG/ML
60 INJECTION, SOLUTION INTRAMUSCULAR; INTRAVENOUS ONCE
Status: COMPLETED | OUTPATIENT
Start: 2019-04-24 | End: 2019-04-24

## 2019-04-24 RX ORDER — PROCHLORPERAZINE MALEATE 10 MG
TABLET ORAL
Qty: 360 TABLET | Refills: 3 | OUTPATIENT
Start: 2019-04-24

## 2019-04-24 RX ORDER — OLANZAPINE 5 MG/1
5 TABLET ORAL
Qty: 30 TABLET | Refills: 0 | Status: SHIPPED | OUTPATIENT
Start: 2019-04-24 | End: 2019-05-22 | Stop reason: SDUPTHER

## 2019-04-24 RX ORDER — PROCHLORPERAZINE MALEATE 10 MG
TABLET ORAL
Qty: 20 TABLET | Refills: 3 | Status: SHIPPED | OUTPATIENT
Start: 2019-04-24 | End: 2020-08-20 | Stop reason: SDUPTHER

## 2019-04-24 RX ORDER — DILTIAZEM HCL 90 MG
1 TABLET ORAL DAILY
COMMUNITY
Start: 2019-03-14 | End: 2021-06-23

## 2019-04-24 RX ORDER — KETOROLAC TROMETHAMINE 30 MG/ML
INJECTION, SOLUTION INTRAMUSCULAR; INTRAVENOUS
Qty: 6 ML | Refills: 0 | Status: SHIPPED | OUTPATIENT
Start: 2019-04-24 | End: 2019-06-27 | Stop reason: SDUPTHER

## 2019-04-24 RX ADMIN — KETOROLAC TROMETHAMINE 60 MG: 30 INJECTION, SOLUTION INTRAMUSCULAR; INTRAVENOUS at 13:31

## 2019-04-25 ENCOUNTER — TELEPHONE (OUTPATIENT)
Dept: NEUROLOGY | Facility: CLINIC | Age: 54
End: 2019-04-25

## 2019-04-30 ENCOUNTER — DOCUMENTATION (OUTPATIENT)
Dept: NEUROLOGY | Facility: CLINIC | Age: 54
End: 2019-04-30

## 2019-05-06 ENCOUNTER — TRANSCRIBE ORDERS (OUTPATIENT)
Dept: LAB | Facility: CLINIC | Age: 54
End: 2019-05-06

## 2019-05-06 ENCOUNTER — APPOINTMENT (OUTPATIENT)
Dept: LAB | Facility: CLINIC | Age: 54
End: 2019-05-06
Payer: COMMERCIAL

## 2019-05-06 DIAGNOSIS — G43.709 CHRONIC MIGRAINE WITHOUT AURA WITHOUT STATUS MIGRAINOSUS, NOT INTRACTABLE: ICD-10-CM

## 2019-05-06 DIAGNOSIS — G60.9 IDIOPATHIC PERIPHERAL NEUROPATHY: ICD-10-CM

## 2019-05-06 DIAGNOSIS — E55.9 VITAMIN D DEFICIENCY: ICD-10-CM

## 2019-05-06 LAB
25(OH)D3 SERPL-MCNC: 13.5 NG/ML (ref 30–100)
ALBUMIN SERPL BCP-MCNC: 3.5 G/DL (ref 3.5–5)
ALP SERPL-CCNC: 101 U/L (ref 46–116)
ALT SERPL W P-5'-P-CCNC: 36 U/L (ref 12–78)
ANION GAP SERPL CALCULATED.3IONS-SCNC: 8 MMOL/L (ref 4–13)
AST SERPL W P-5'-P-CCNC: 20 U/L (ref 5–45)
BASOPHILS # BLD AUTO: 0.04 THOUSANDS/ΜL (ref 0–0.1)
BASOPHILS NFR BLD AUTO: 1 % (ref 0–1)
BILIRUB SERPL-MCNC: 0.4 MG/DL (ref 0.2–1)
BUN SERPL-MCNC: 13 MG/DL (ref 5–25)
CALCIUM SERPL-MCNC: 9.2 MG/DL (ref 8.3–10.1)
CHLORIDE SERPL-SCNC: 110 MMOL/L (ref 100–108)
CO2 SERPL-SCNC: 27 MMOL/L (ref 21–32)
CREAT SERPL-MCNC: 0.9 MG/DL (ref 0.6–1.3)
EOSINOPHIL # BLD AUTO: 0.37 THOUSAND/ΜL (ref 0–0.61)
EOSINOPHIL NFR BLD AUTO: 9 % (ref 0–6)
ERYTHROCYTE [DISTWIDTH] IN BLOOD BY AUTOMATED COUNT: 15.4 % (ref 11.6–15.1)
GFR SERPL CREATININE-BSD FRML MDRD: 74 ML/MIN/1.73SQ M
GLUCOSE P FAST SERPL-MCNC: 101 MG/DL (ref 65–99)
HCT VFR BLD AUTO: 41.7 % (ref 34.8–46.1)
HGB BLD-MCNC: 13.3 G/DL (ref 11.5–15.4)
IMM GRANULOCYTES # BLD AUTO: 0.01 THOUSAND/UL (ref 0–0.2)
IMM GRANULOCYTES NFR BLD AUTO: 0 % (ref 0–2)
LYMPHOCYTES # BLD AUTO: 1.53 THOUSANDS/ΜL (ref 0.6–4.47)
LYMPHOCYTES NFR BLD AUTO: 36 % (ref 14–44)
MCH RBC QN AUTO: 26.1 PG (ref 26.8–34.3)
MCHC RBC AUTO-ENTMCNC: 31.9 G/DL (ref 31.4–37.4)
MCV RBC AUTO: 82 FL (ref 82–98)
MONOCYTES # BLD AUTO: 0.24 THOUSAND/ΜL (ref 0.17–1.22)
MONOCYTES NFR BLD AUTO: 6 % (ref 4–12)
NEUTROPHILS # BLD AUTO: 2.11 THOUSANDS/ΜL (ref 1.85–7.62)
NEUTS SEG NFR BLD AUTO: 48 % (ref 43–75)
NRBC BLD AUTO-RTO: 0 /100 WBCS
PLATELET # BLD AUTO: 285 THOUSANDS/UL (ref 149–390)
PMV BLD AUTO: 10 FL (ref 8.9–12.7)
POTASSIUM SERPL-SCNC: 4.1 MMOL/L (ref 3.5–5.3)
PROT SERPL-MCNC: 6.8 G/DL (ref 6.4–8.2)
RBC # BLD AUTO: 5.1 MILLION/UL (ref 3.81–5.12)
SODIUM SERPL-SCNC: 145 MMOL/L (ref 136–145)
TSH SERPL DL<=0.05 MIU/L-ACNC: 2.73 UIU/ML (ref 0.36–3.74)
VIT B12 SERPL-MCNC: 328 PG/ML (ref 100–900)
WBC # BLD AUTO: 4.3 THOUSAND/UL (ref 4.31–10.16)

## 2019-05-06 PROCEDURE — 80053 COMPREHEN METABOLIC PANEL: CPT

## 2019-05-06 PROCEDURE — 85025 COMPLETE CBC W/AUTO DIFF WBC: CPT

## 2019-05-06 PROCEDURE — 36415 COLL VENOUS BLD VENIPUNCTURE: CPT

## 2019-05-06 PROCEDURE — 82607 VITAMIN B-12: CPT

## 2019-05-06 PROCEDURE — 82306 VITAMIN D 25 HYDROXY: CPT

## 2019-05-06 PROCEDURE — 84443 ASSAY THYROID STIM HORMONE: CPT

## 2019-05-13 DIAGNOSIS — E55.9 VITAMIN D DEFICIENCY: Primary | ICD-10-CM

## 2019-05-13 RX ORDER — ERGOCALCIFEROL 1.25 MG/1
50000 CAPSULE ORAL WEEKLY
Qty: 12 CAPSULE | Refills: 0 | Status: SHIPPED | OUTPATIENT
Start: 2019-05-13 | End: 2020-05-04 | Stop reason: ALTCHOICE

## 2019-05-14 ENCOUNTER — TELEPHONE (OUTPATIENT)
Dept: NEUROLOGY | Facility: CLINIC | Age: 54
End: 2019-05-14

## 2019-05-15 ENCOUNTER — TELEPHONE (OUTPATIENT)
Dept: NEUROLOGY | Facility: CLINIC | Age: 54
End: 2019-05-15

## 2019-05-16 ENCOUNTER — TELEPHONE (OUTPATIENT)
Dept: NEUROLOGY | Facility: CLINIC | Age: 54
End: 2019-05-16

## 2019-05-16 DIAGNOSIS — G43.709 CHRONIC MIGRAINE WITHOUT AURA WITHOUT STATUS MIGRAINOSUS, NOT INTRACTABLE: ICD-10-CM

## 2019-05-16 RX ORDER — PROCHLORPERAZINE MALEATE 10 MG
TABLET ORAL
Qty: 20 TABLET | Refills: 0 | OUTPATIENT
Start: 2019-05-16

## 2019-05-22 DIAGNOSIS — G43.709 CHRONIC MIGRAINE WITHOUT AURA WITHOUT STATUS MIGRAINOSUS, NOT INTRACTABLE: ICD-10-CM

## 2019-05-22 RX ORDER — OLANZAPINE 5 MG/1
TABLET ORAL
Qty: 30 TABLET | Refills: 0 | Status: SHIPPED | OUTPATIENT
Start: 2019-05-22 | End: 2019-06-21 | Stop reason: SDUPTHER

## 2019-06-17 ENCOUNTER — HOSPITAL ENCOUNTER (OUTPATIENT)
Dept: MRI IMAGING | Facility: HOSPITAL | Age: 54
Discharge: HOME | End: 2019-06-17
Attending: PSYCHIATRY & NEUROLOGY
Payer: COMMERCIAL

## 2019-06-17 DIAGNOSIS — G43.709 CHRONIC MIGRAINE WITHOUT AURA WITHOUT STATUS MIGRAINOSUS, NOT INTRACTABLE: ICD-10-CM

## 2019-06-17 PROCEDURE — 70553 MRI BRAIN STEM W/O & W/DYE: CPT

## 2019-06-17 PROCEDURE — A9585 GADOBUTROL INJECTION: HCPCS | Performed by: PSYCHIATRY & NEUROLOGY

## 2019-06-17 RX ADMIN — GADOBUTROL 8 ML: 604.72 INJECTION INTRAVENOUS at 07:29

## 2019-06-21 DIAGNOSIS — G43.709 CHRONIC MIGRAINE WITHOUT AURA WITHOUT STATUS MIGRAINOSUS, NOT INTRACTABLE: ICD-10-CM

## 2019-06-21 RX ORDER — OLANZAPINE 5 MG/1
5 TABLET ORAL
Qty: 30 TABLET | Refills: 0 | Status: SHIPPED | OUTPATIENT
Start: 2019-06-21 | End: 2019-07-19 | Stop reason: SDUPTHER

## 2019-06-27 DIAGNOSIS — G43.709 CHRONIC MIGRAINE WITHOUT AURA WITHOUT STATUS MIGRAINOSUS, NOT INTRACTABLE: Primary | ICD-10-CM

## 2019-06-27 DIAGNOSIS — G43.709 CHRONIC MIGRAINE WITHOUT AURA WITHOUT STATUS MIGRAINOSUS, NOT INTRACTABLE: ICD-10-CM

## 2019-06-27 RX ORDER — KETOROLAC TROMETHAMINE 30 MG/ML
INJECTION, SOLUTION INTRAMUSCULAR; INTRAVENOUS
Qty: 6 ML | Refills: 0 | Status: SHIPPED | OUTPATIENT
Start: 2019-06-27 | End: 2019-09-01 | Stop reason: SDUPTHER

## 2019-06-27 RX ORDER — SYRINGE WITH NEEDLE, 1 ML 25GX5/8"
SYRINGE, EMPTY DISPOSABLE MISCELLANEOUS
Qty: 6 EACH | Refills: 2 | Status: SHIPPED | OUTPATIENT
Start: 2019-06-27 | End: 2020-08-25 | Stop reason: SDUPTHER

## 2019-06-27 RX ORDER — SYRINGE WITH NEEDLE, 1 ML 25GX5/8"
SYRINGE, EMPTY DISPOSABLE MISCELLANEOUS
Refills: 2 | COMMUNITY
Start: 2019-05-29 | End: 2019-06-27 | Stop reason: SDUPTHER

## 2019-07-19 DIAGNOSIS — G43.709 CHRONIC MIGRAINE WITHOUT AURA WITHOUT STATUS MIGRAINOSUS, NOT INTRACTABLE: ICD-10-CM

## 2019-07-19 RX ORDER — OLANZAPINE 5 MG/1
TABLET ORAL
Qty: 30 TABLET | Refills: 0 | Status: SHIPPED | OUTPATIENT
Start: 2019-07-19 | End: 2019-08-18 | Stop reason: SDUPTHER

## 2019-07-19 RX ORDER — GABAPENTIN 300 MG/1
CAPSULE ORAL
Qty: 540 CAPSULE | Refills: 0 | Status: SHIPPED | OUTPATIENT
Start: 2019-07-19 | End: 2019-10-17 | Stop reason: SDUPTHER

## 2019-08-18 DIAGNOSIS — G43.709 CHRONIC MIGRAINE WITHOUT AURA WITHOUT STATUS MIGRAINOSUS, NOT INTRACTABLE: ICD-10-CM

## 2019-08-19 RX ORDER — OLANZAPINE 5 MG/1
TABLET ORAL
Qty: 30 TABLET | Refills: 0 | Status: SHIPPED | OUTPATIENT
Start: 2019-08-19 | End: 2019-09-17 | Stop reason: SDUPTHER

## 2019-09-01 DIAGNOSIS — G43.709 CHRONIC MIGRAINE WITHOUT AURA WITHOUT STATUS MIGRAINOSUS, NOT INTRACTABLE: ICD-10-CM

## 2019-09-03 RX ORDER — KETOROLAC TROMETHAMINE 30 MG/ML
INJECTION, SOLUTION INTRAMUSCULAR; INTRAVENOUS
Qty: 6 ML | Refills: 0 | Status: SHIPPED | OUTPATIENT
Start: 2019-09-03 | End: 2019-10-04 | Stop reason: SDUPTHER

## 2019-09-17 DIAGNOSIS — G43.709 CHRONIC MIGRAINE WITHOUT AURA WITHOUT STATUS MIGRAINOSUS, NOT INTRACTABLE: ICD-10-CM

## 2019-09-17 RX ORDER — OLANZAPINE 5 MG/1
TABLET ORAL
Qty: 30 TABLET | Refills: 6 | Status: SHIPPED | OUTPATIENT
Start: 2019-09-17 | End: 2020-05-04 | Stop reason: ALTCHOICE

## 2019-10-04 DIAGNOSIS — G43.709 CHRONIC MIGRAINE WITHOUT AURA WITHOUT STATUS MIGRAINOSUS, NOT INTRACTABLE: ICD-10-CM

## 2019-10-04 RX ORDER — KETOROLAC TROMETHAMINE 30 MG/ML
INJECTION, SOLUTION INTRAMUSCULAR; INTRAVENOUS
Qty: 6 ML | Refills: 0 | Status: SHIPPED | OUTPATIENT
Start: 2019-10-04 | End: 2019-12-02 | Stop reason: SDUPTHER

## 2019-10-04 NOTE — TELEPHONE ENCOUNTER
[facility] [employer] faxed med refill request fo rpt ketorolac 60mg/2 ml   Pt last ov 4/24/19 next f/u 12/20/19

## 2019-10-17 DIAGNOSIS — G43.709 CHRONIC MIGRAINE WITHOUT AURA WITHOUT STATUS MIGRAINOSUS, NOT INTRACTABLE: ICD-10-CM

## 2019-10-17 RX ORDER — GABAPENTIN 300 MG/1
CAPSULE ORAL
Qty: 540 CAPSULE | Refills: 0 | Status: SHIPPED | OUTPATIENT
Start: 2019-10-17 | End: 2020-01-15

## 2019-12-02 ENCOUNTER — TELEPHONE (OUTPATIENT)
Dept: NEUROLOGY | Facility: CLINIC | Age: 54
End: 2019-12-02

## 2019-12-02 DIAGNOSIS — G43.709 CHRONIC MIGRAINE WITHOUT AURA WITHOUT STATUS MIGRAINOSUS, NOT INTRACTABLE: ICD-10-CM

## 2019-12-02 RX ORDER — KETOROLAC TROMETHAMINE 30 MG/ML
INJECTION, SOLUTION INTRAMUSCULAR; INTRAVENOUS
Qty: 6 ML | Refills: 0 | Status: SHIPPED | OUTPATIENT
Start: 2019-12-02 | End: 2020-01-31

## 2019-12-02 RX ORDER — SYRINGE W-NEEDLE,DISPOSAB,3 ML 25GX5/8"
SYRINGE, EMPTY DISPOSABLE MISCELLANEOUS
Qty: 3 EACH | Refills: 2 | Status: SHIPPED | OUTPATIENT
Start: 2019-12-02 | End: 2022-01-26 | Stop reason: SDUPTHER

## 2019-12-02 NOTE — TELEPHONE ENCOUNTER
Pharmacy calling requesting refill of ketorolac injection  If agreeable, please sent to [place]

## 2019-12-18 ENCOUNTER — TELEPHONE (OUTPATIENT)
Dept: NEUROLOGY | Facility: CLINIC | Age: 54
End: 2019-12-18

## 2020-01-15 DIAGNOSIS — G43.709 CHRONIC MIGRAINE WITHOUT AURA WITHOUT STATUS MIGRAINOSUS, NOT INTRACTABLE: ICD-10-CM

## 2020-01-15 RX ORDER — GABAPENTIN 300 MG/1
CAPSULE ORAL
Qty: 540 CAPSULE | Refills: 0 | Status: SHIPPED | OUTPATIENT
Start: 2020-01-15 | End: 2021-04-27

## 2020-01-31 DIAGNOSIS — G43.709 CHRONIC MIGRAINE WITHOUT AURA WITHOUT STATUS MIGRAINOSUS, NOT INTRACTABLE: ICD-10-CM

## 2020-01-31 RX ORDER — KETOROLAC TROMETHAMINE 30 MG/ML
INJECTION, SOLUTION INTRAMUSCULAR; INTRAVENOUS
Qty: 6 ML | Refills: 0 | Status: SHIPPED | OUTPATIENT
Start: 2020-01-31 | End: 2020-05-15 | Stop reason: SDUPTHER

## 2020-04-30 ENCOUNTER — TELEPHONE (OUTPATIENT)
Dept: NEUROLOGY | Facility: CLINIC | Age: 55
End: 2020-04-30

## 2020-04-30 DIAGNOSIS — G43.709 CHRONIC MIGRAINE WITHOUT AURA WITHOUT STATUS MIGRAINOSUS, NOT INTRACTABLE: Primary | ICD-10-CM

## 2020-04-30 RX ORDER — GALCANEZUMAB 120 MG/ML
1 INJECTION, SOLUTION SUBCUTANEOUS
Qty: 1 PEN | Refills: 11 | Status: SHIPPED | OUTPATIENT
Start: 2020-04-30 | End: 2021-05-28

## 2020-04-30 RX ORDER — GALCANEZUMAB 120 MG/ML
INJECTION, SOLUTION SUBCUTANEOUS
COMMUNITY
Start: 2020-01-22 | End: 2020-04-30 | Stop reason: SDUPTHER

## 2020-05-04 ENCOUNTER — TELEPHONE (OUTPATIENT)
Dept: NEUROLOGY | Facility: CLINIC | Age: 55
End: 2020-05-04

## 2020-05-04 ENCOUNTER — TELEMEDICINE (OUTPATIENT)
Dept: NEUROLOGY | Facility: CLINIC | Age: 55
End: 2020-05-04
Payer: COMMERCIAL

## 2020-05-04 DIAGNOSIS — G43.709 CHRONIC MIGRAINE WITHOUT AURA WITHOUT STATUS MIGRAINOSUS, NOT INTRACTABLE: Primary | ICD-10-CM

## 2020-05-04 PROCEDURE — 99214 OFFICE O/P EST MOD 30 MIN: CPT | Performed by: PSYCHIATRY & NEUROLOGY

## 2020-05-04 RX ORDER — PROCHLORPERAZINE MALEATE 10 MG
TABLET ORAL
Qty: 20 TABLET | Refills: 3 | Status: SHIPPED | OUTPATIENT
Start: 2020-05-04 | End: 2020-12-14 | Stop reason: SDUPTHER

## 2020-05-04 RX ORDER — OLANZAPINE 5 MG/1
TABLET ORAL
Qty: 30 TABLET | Refills: 1 | Status: SHIPPED | OUTPATIENT
Start: 2020-05-04 | End: 2020-12-14

## 2020-05-07 ENCOUNTER — TELEPHONE (OUTPATIENT)
Dept: NEUROLOGY | Facility: CLINIC | Age: 55
End: 2020-05-07

## 2020-05-15 DIAGNOSIS — G43.709 CHRONIC MIGRAINE WITHOUT AURA WITHOUT STATUS MIGRAINOSUS, NOT INTRACTABLE: ICD-10-CM

## 2020-05-15 RX ORDER — KETOROLAC TROMETHAMINE 30 MG/ML
INJECTION, SOLUTION INTRAMUSCULAR; INTRAVENOUS
Qty: 6 ML | Refills: 0 | Status: SHIPPED | OUTPATIENT
Start: 2020-05-15 | End: 2020-08-25

## 2020-06-22 ENCOUNTER — TELEPHONE (OUTPATIENT)
Dept: NEUROLOGY | Facility: CLINIC | Age: 55
End: 2020-06-22

## 2020-07-27 DIAGNOSIS — Z20.828 EXPOSURE TO SARS-ASSOCIATED CORONAVIRUS: ICD-10-CM

## 2020-07-27 PROCEDURE — U0003 INFECTIOUS AGENT DETECTION BY NUCLEIC ACID (DNA OR RNA); SEVERE ACUTE RESPIRATORY SYNDROME CORONAVIRUS 2 (SARS-COV-2) (CORONAVIRUS DISEASE [COVID-19]), AMPLIFIED PROBE TECHNIQUE, MAKING USE OF HIGH THROUGHPUT TECHNOLOGIES AS DESCRIBED BY CMS-2020-01-R: HCPCS

## 2020-07-28 LAB — SARS-COV-2 RNA SPEC QL NAA+PROBE: DETECTED

## 2020-07-29 ENCOUNTER — TELEPHONE (OUTPATIENT)
Dept: OTHER | Facility: OTHER | Age: 55
End: 2020-07-29

## 2020-07-29 NOTE — TELEPHONE ENCOUNTER
Your test for the novel coronavirus, also known as COVID-19, was positive  The sample showed that the virus was present  We are going to go through some general information to keep you safe at home and schedule a virtual visit for you to be evaluated by your provider  If your symptoms are generally mild and stable, you are safe to isolate yourself at home  If you develop difficulty breathing before your scheduled visit with your provider, you should contact the office immediately or go to the nearest ED for evaluation  Treatment of coronavirus does not require an antibiotic  The most important thing you can do is to remain home except to receive medical care  Do not go to work, school, or public areas  Remain isolated for 7 days at a minimum or 72 hours after your symptoms have completely resolved whichever is longer  For example, if all of your symptoms get better after 2 days, you should still remain isolated for 7 days  If your symptoms get better after 10 days, you should remain isolated for 13 days  Wash your hands often with soap and water for at least 20 seconds  Alternatively, you may use hand  with at least 60% alcohol content  Cover your coughs and sneezes and use a facemask when around others if possible  Clean all high-touch surfaces every day such as doorknobs and cellphones  Lastly, we are going to set up your appointment  If you have any additional questions, we can schedule a virtual visit for you with a provider or call the [facility] [phone], option 7, for care advice    For additional information, please visit the Coronavirus FAQ on the [facility] home page ([facility]  org)  Patient reports the health department already notified them of their results and answered all their questions  Patient still has sore throat and cold symptoms but is managing at home  She has an external PCP she can follow up with if symptoms worsen

## 2020-08-19 DIAGNOSIS — G43.709 CHRONIC MIGRAINE WITHOUT AURA WITHOUT STATUS MIGRAINOSUS, NOT INTRACTABLE: Primary | ICD-10-CM

## 2020-08-19 NOTE — TELEPHONE ENCOUNTER
Patient is agreeable to trying the depakote 500mg and compazine  I have queued the depakote up for you       TY

## 2020-08-19 NOTE — TELEPHONE ENCOUNTER
Patient calling stating that she is on day 16 of a migraine  Headache located on the right side  HA associated with nausea/vomiting, photophobia, phonophobia  Patient did go and have vision testing completed, no major changes  Patient states that nothing is helping her at this time  Preventatives  Emgality taken 8/7  Gabapentin 600mg TID    Abortives  toradol injection 8/16/20 did not help  Prochlorperazine  Olanzapine  Advil Migraine  Excedrin    Patient was Covid positive at the end of July  Patient was asymptomatic during infection  Patient asking how to proceed since she is now taking OTC medications and knows that is not helping  Patient cannot take steroids d/t gastric bypass  [phone] okay to leave a detailed message

## 2020-08-19 NOTE — TELEPHONE ENCOUNTER
I would like for her to try Depakote 500 mg at bedtime for 5 days to see if this helps  Also may do Compazine 3 times a day for 2 days to see if this also helps along with the Depakote    Thank you

## 2020-08-20 DIAGNOSIS — G43.709 CHRONIC MIGRAINE WITHOUT AURA WITHOUT STATUS MIGRAINOSUS, NOT INTRACTABLE: ICD-10-CM

## 2020-08-20 RX ORDER — PROCHLORPERAZINE MALEATE 10 MG
TABLET ORAL
Qty: 20 TABLET | Refills: 3 | Status: SHIPPED | OUTPATIENT
Start: 2020-08-20 | End: 2020-12-14

## 2020-08-20 RX ORDER — DIVALPROEX SODIUM 500 MG/1
500 TABLET, DELAYED RELEASE ORAL
Qty: 5 TABLET | Refills: 0 | Status: SHIPPED | OUTPATIENT
Start: 2020-08-20 | End: 2020-09-09

## 2020-08-24 DIAGNOSIS — G43.709 CHRONIC MIGRAINE WITHOUT AURA WITHOUT STATUS MIGRAINOSUS, NOT INTRACTABLE: ICD-10-CM

## 2020-08-25 DIAGNOSIS — G43.709 CHRONIC MIGRAINE WITHOUT AURA WITHOUT STATUS MIGRAINOSUS, NOT INTRACTABLE: ICD-10-CM

## 2020-08-25 RX ORDER — SYRINGE WITH NEEDLE, 1 ML 25GX5/8"
SYRINGE, EMPTY DISPOSABLE MISCELLANEOUS
Qty: 6 EACH | Refills: 2 | Status: SHIPPED | OUTPATIENT
Start: 2020-08-25

## 2020-08-25 RX ORDER — KETOROLAC TROMETHAMINE 30 MG/ML
INJECTION, SOLUTION INTRAMUSCULAR; INTRAVENOUS
Qty: 6 ML | Refills: 0 | Status: SHIPPED | OUTPATIENT
Start: 2020-08-25 | End: 2022-01-26 | Stop reason: SDUPTHER

## 2020-08-25 NOTE — TELEPHONE ENCOUNTER
Medication refill check list    Correct patient? yes   Correct medication name, dose, and pill size? yes   Correct provider? yes   Last and Next appt  scheduled? Yes, last date 05/04/20 & next date 09/17/20   Right pharmacy listed? yes   Correct quantity for 30 or 90 days? yes   Is the patient out of refills? When was it last prescribed? Yes, last date 06/27/19   Directions match what the patient says they are taking?  yes   Enough refills? (none for controlled substances, 1 year for routine medications) yes

## 2020-09-07 DIAGNOSIS — G43.709 CHRONIC MIGRAINE WITHOUT AURA WITHOUT STATUS MIGRAINOSUS, NOT INTRACTABLE: ICD-10-CM

## 2020-09-09 DIAGNOSIS — G43.709 CHRONIC MIGRAINE WITHOUT AURA WITHOUT STATUS MIGRAINOSUS, NOT INTRACTABLE: ICD-10-CM

## 2020-09-09 RX ORDER — DIVALPROEX SODIUM 500 MG/1
TABLET, DELAYED RELEASE ORAL
Qty: 5 TABLET | Refills: 0 | Status: SHIPPED | OUTPATIENT
Start: 2020-09-09 | End: 2020-09-17 | Stop reason: ALTCHOICE

## 2020-09-13 DIAGNOSIS — G43.709 CHRONIC MIGRAINE WITHOUT AURA WITHOUT STATUS MIGRAINOSUS, NOT INTRACTABLE: ICD-10-CM

## 2020-09-14 RX ORDER — KETOROLAC TROMETHAMINE 30 MG/ML
INJECTION, SOLUTION INTRAMUSCULAR; INTRAVENOUS
Qty: 6 ML | Refills: 0 | OUTPATIENT
Start: 2020-09-14

## 2020-09-17 ENCOUNTER — OFFICE VISIT (OUTPATIENT)
Dept: NEUROLOGY | Facility: CLINIC | Age: 55
End: 2020-09-17
Payer: COMMERCIAL

## 2020-09-17 VITALS
WEIGHT: 195 LBS | DIASTOLIC BLOOD PRESSURE: 87 MMHG | TEMPERATURE: 97.4 F | HEIGHT: 63 IN | SYSTOLIC BLOOD PRESSURE: 130 MMHG | BODY MASS INDEX: 34.55 KG/M2 | HEART RATE: 80 BPM

## 2020-09-17 DIAGNOSIS — G43.709 CHRONIC MIGRAINE WITHOUT AURA WITHOUT STATUS MIGRAINOSUS, NOT INTRACTABLE: Primary | ICD-10-CM

## 2020-09-17 PROCEDURE — 99214 OFFICE O/P EST MOD 30 MIN: CPT | Performed by: PSYCHIATRY & NEUROLOGY

## 2020-09-17 PROCEDURE — 96372 THER/PROPH/DIAG INJ SC/IM: CPT | Performed by: PSYCHIATRY & NEUROLOGY

## 2020-09-17 RX ORDER — CLONAZEPAM 0.5 MG/1
0.5 TABLET ORAL DAILY
COMMUNITY

## 2020-09-17 RX ORDER — BUPROPION HYDROCHLORIDE 75 MG/1
75 TABLET ORAL DAILY
COMMUNITY
End: 2021-06-23

## 2020-09-17 RX ORDER — PROCHLORPERAZINE MALEATE 10 MG
TABLET ORAL
Qty: 20 TABLET | Refills: 3 | Status: SHIPPED | OUTPATIENT
Start: 2020-09-17 | End: 2020-12-14

## 2020-09-17 RX ORDER — PROCHLORPERAZINE EDISYLATE 5 MG/ML
10 INJECTION INTRAMUSCULAR; INTRAVENOUS ONCE
Status: COMPLETED | OUTPATIENT
Start: 2020-09-17 | End: 2020-09-17

## 2020-09-17 RX ADMIN — PROCHLORPERAZINE EDISYLATE 10 MG: 5 INJECTION INTRAMUSCULAR; INTRAVENOUS at 15:33

## 2020-09-17 NOTE — PROGRESS NOTES
[facility]  PATIENT:  Leisa Arreguin  MRN:  [id number]  :  1965  DATE OF SERVICE:  2020      Assessment/Plan:      Problem List Items Addressed This Visit        Cardiovascular and Mediastinum    Chronic migraine without aura - Primary    Relevant Medications    buPROPion (WELLBUTRIN) 75 mg tablet    clonazePAM (KlonoPIN) 0 5 mg tablet    FLUoxetine HCl (PROZAC PO)    prochlorperazine (COMPAZINE) injection 10 mg (Completed) (Start on 2020  3:45 PM)    prochlorperazine (COMPAZINE) 10 mg tablet         Vitamin-D deficiency (May 2019 -13): continue taking 2000 International Units on daily basis     Low B12 level last year-continue taking B12 500 micro g daily     Chronic migraine headaches:  Preventive therapy for headaches:   - Emgality 120mg 1 injection monthly-patient has also needs to rested in going back to doing Botox   - Gabapentin 600 mg 3 times a day  Abortive therapy for headaches:   - Has tried Triptan in the past with no benefit  - Status post gastric bypass in 2019  - To help break her current headache will have her take Compazine (prochlorperazine) 10 mg 3 times a day for 3 days along with Benadryl 50 mg at bedtime  If this fails patient will consider hospital admission to help break her headache cycle       Headache management instructions  - When patient has a moderate to severe headache, they should seek rest, initiate relaxation and apply cold compresses to the head  - Maintain regular sleep schedule  Adults need at least 7-8 hours of uninterrupted a night  - Limit over the counter medications such as Tylenol, Ibuprofen, Aleve, Excedrin  (No more than 3 times a week)  - Maintain headache diary   We discussed an JEREMI for a smart phone is "Migraine nely"  - Limit caffeine to 1-2 cups 8 to 16 oz a day or less  - Avoid dietary trigger  (aged cheese, peanuts, MSG, aspartame and nitrates)    - Patient is to have regular frequent meals to prevent headache onset     - Please drink at least 64 ounces of water a day to help remain hydrated      Please call with any questions or concerns  Office number is [facility] Monitoring Program report was reviewed       History of Present Illness: We had the pleasure of evaluating Miya Camarillo in neurological follow up  today for headaches   As you know,  she is a 49 y  o  left handed female  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 PREVENTATIVE:  - vitamin-D 2,  - Botox -2018  - Emgality 120 mg (started-January 2020),  - gabapentin 300 mg 2 capsules, Depakote (for 5 days but did not help), Topamax (not effective)  - amlodipine 10 mg, diltiazem, propanolol  - olanzapine 5 mg (does not help either),   - Cymbalta 60 mg, nortriptyline, protriptyline  ABORTIVE:  - Decadron 2 mg (Gastric bypass)  - Advil, Aleve, Toradol injection (work the best),  - Midrin, Fioricet  - Reglan 10 mg, Compazine 10 mg,   - Imitrex 100 mg, Maxalt 10 mg        What is your current pain level - 8/10     How often do the headaches occur?  prior to below she was getting 1 headachesw a month     dialy for the last 5 week and does not feel anything has really helped braek the headahs    - 5 to 10/10 daily for the last 5 week     Are you ever headache free? Yes  Aura/warning and how long does it last - none     What time of the day do the headaches start? Typically wakes up with them     How long do the headaches last? It will last one day but can last up to 7 days at time     Describe your usual headache - Throbbing, Pounding, Pressure,      Where is your headache located? Typically left side of her head and at time on the right and that is when it is really severe       What is the intensity of pain? 6 to 10/10       Associated symptoms:   · Decrease of appetite, nausea, vomiting, diarrhea  · Photophobia, phonophobia, sensitivity to smell   · Problem with concentration  · light-headed or dizzy, stiff or sore neck,   · Hands or feet tingle or feel numb, prefer to be alone and in a dark room, unable to work     Number of days missed per month because of headaches:  Work (or school) days: has to work through it  Social or Family activities: yes      Alternative therapies used in the past for headaches? heating pad  Headache are worse if the patient: cough, sneeze  Headache triggers: Food ( MSG ), Stress, weather change, lack of sleep, neck pain     What time of the year do headaches occur more frequently? fall  Have you seen someone else for headaches or pain? No  Have you had trigger point injection performed and how often? Yes  Have you had Botox injection performed and how often? Yes   Have you had epidural injections or transforaminal injections performed? No     Have you used CBD or THC for your headaches and how often? No     Have you ever had any Brain imaging? yes   Recent laboratory data was reviewed  Medications and allergies were reviewed       06/17/2019-MRI of the brain  FINDINGS:   BRAIN PARENCHYMA: Dellie Siskin is no discrete mass, mass effect or midline shift   There is no intracranial hemorrhage   Normal posterior fossa   Diffusion imaging is unremarkable    Scattered punctate foci of frontal subcortical and deep cerebral white matter [id number] are again noted   Few similar punctate foci of FLAIR hyperintense signal are also noted in the left parietal lobe   No brainstem or cerebellar signal abnormalities identified   Postcontrast imaging of the brain demonstrates no abnormal enhancement    VENTRICLES:  Normal    SELLA AND PITUITARY GLAND:  Normal    ORBITS:  Normal    PARANASAL SINUSES:  Inflammatory mucosal thickening within the left more than right maxillary sinus  [facility] asymmetric nonenhancing soft tissue within the left posterior nasopharynx likely inflammatory in etiology   Direct inspection is recommended to   exclude a new callosal lesion    VASCULATURE:  Diminutive vertebrobasilar circulation   Probable fetal posterior cerebral circulation   CALVARIUM AND SKULL BASE:  Normal    EXTRACRANIAL SOFT TISSUES:  Normal    IMPRESSION:   Stable MRI of the brain, redemonstrating nonspecific cerebral white matter foci of signal abnormality which are in keeping with the patient's reported history of migraines   No pathologic intracranial enhancement    Probable secretions within the posterior left nasopharynx   Recommend direct inspection to exclude a mucosal abnormality   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      MRI brain 2015  IMPRESSION-   1   Several small scattered white matter lesions within the cerebral hemispheres may represent precocious chronic microvascular ischemic disease   These are unchanged   Correlate for history of hypertension, diabetes or hypercholesterolemia     2   No evidence of mass, hemorrhage or acute ischemia        Past Medical History:   Diagnosis Date    Asthma     Hypertension     Migraine     Rapid heart rate        Patient Active Problem List   Diagnosis    Chronic migraine without aura    Peripheral neuropathy       Medications:      Current Outpatient Medications   Medication Sig Dispense Refill    B-D 3CC LUER-ARACELIS SYR 25GX1/2" 25G X 1-1/2" 3 ML MISC USE FOR TORADOL INTRAMUSCULAR INJECTIONS 6 each 2    BREO ELLIPTA Inhale 1 puff Daily  5    buPROPion (WELLBUTRIN) 75 mg tablet Take 75 mg by mouth daily      clonazePAM (KlonoPIN) 0 5 mg tablet Take 0 5 mg by mouth 2 (two) times a day as needed for seizures      diltiazem (CARDIZEM) 90 mg tablet Take 1 tablet by mouth daily      EMGALITY 120 MG/ML SOAJ Inject 1 Syringe as directed every 30 (thirty) days 1 pen 11    FLUoxetine HCl (PROZAC PO) Take 1 tablet by mouth daily      gabapentin (NEURONTIN) 300 mg capsule TAKE 2 CAPSULES(600 MG) BY MOUTH THREE TIMES DAILY 540 capsule 0    ketorolac (TORADOL) 30 mg/mL injection INJECT 1-2 ML 'S IN THE MUSCLE ONCE PER 24 HOURS AS NEEDED FOR MIGRAINE  NO MORE THAN 2 INJECTIONS PER WEEK 6 mL 0    PROAIR  (90 Base) MCG/ACT inhaler Inhale 2 puffs every 6 (six) hours as needed   1    Syringe/Needle, Disp, (SYRINGE 3CC/78IZ8-7/4") 27G X 1-1/4" 3 ML MISC Use for Toradol intramuscular injections  3 each 2    DULoxetine (CYMBALTA) 60 mg delayed release capsule Take 60 mg by mouth daily   0    OLANZapine (ZyPREXA) 5 mg tablet 1 tab at bedtime nightly on the week of her headache (Patient not taking: Reported on 9/17/2020) 30 tablet 1    prochlorperazine (COMPAZINE) 10 mg tablet 1 tab in a m  Daily during the week of her headache (Patient not taking: Reported on 9/17/2020) 20 tablet 3    prochlorperazine (COMPAZINE) 10 mg tablet 1 tab at onset of migraine, can repeat in 8 hours, can take with triptan/NSAID (Patient not taking: Reported on 9/17/2020) 20 tablet 3    prochlorperazine (COMPAZINE) 10 mg tablet 1 tab 3 times a day for 3 days, then as needed for moderate to severe headache t i d  P r n  20 tablet 3     No current facility-administered medications for this visit  Allergies:       Allergies   Allergen Reactions    Latex     Other      Annotation - [id number]: STEROIDS    Penicillins        Family History:     Family History   Problem Relation Age of Onset    Diabetes Father     Lung cancer Father        Social History:     Social History     Socioeconomic History    Marital status: /Civil Union     Spouse name: Not on file    Number of children: Not on file    Years of education: Not on file    Highest education level: Not on file   Occupational History    Not on file   Social Needs    Financial resource strain: Not on file    Food insecurity     Worry: Not on file     Inability: Not on file   [employer] needs     Medical: Not on file     Non-medical: Not on file   Tobacco Use    Smoking status: Former Smoker     Last attempt to quit: 1986     Years since quittin 7    Smokeless tobacco: Never Used   Substance and Sexual Activity    Alcohol use: Not Currently    Drug use: Never    Sexual activity: Not on file   Lifestyle    Physical activity     Days per week: Not on file     Minutes per session: Not on file    Stress: Not on file   Relationships    Social connections     Talks on phone: Not on file     Gets together: Not on file     Attends [employer] service: Not on file     Active member of club or organization: Not on file     Attends meetings of clubs or organizations: Not on file     Relationship status: Not on file    Intimate partner violence     Fear of current or ex partner: Not on file     Emotionally abused: Not on file     Physically abused: Not on file     Forced sexual activity: Not on file   Other Topics Concern    Not on file   Social History Narrative    Not on file         Objective:   Physical Exam:                                                                   Vitals:            /87 (BP Location: Left arm, Patient Position: Sitting, Cuff Size: Standard)   Pulse 80   Temp (!) 97 4 °F (36 3 °C) (Temporal)   Ht 5' 2 5" (1 588 m)   Wt 88 5 kg (195 lb)   BMI 35 10 kg/m²   BP Readings from Last 3 Encounters:   20 130/87   19 127/85   19 118/78     Pulse Readings from Last 3 Encounters:   20 80   19 91   19 91            CONSTITUTIONAL: Well developed, well nourished, well groomed  No dysmorphic features  Eyes:  PERRLA, EOM normal      Neck:  Normal ROM, neck supple  HEENT:  Normocephalic atraumatic  No meningismus  Oropharynx is clear and moist  No oral mucosal lesions  Chest:  Respirations regular and unlabored      Cardiovascular:  Distal extremities warm without palpable edema or tenderness, no observed significant swelling  Musculoskeletal:  Full range of motion  Skin:  warm and dry   Psychiatric:  Normal behavior and appropriate affect      Neurological Examination:   Mental status/cognitive function: Orientated to time, place and person  Cranial Nerves: 2 to 12 intact    Motor Exam:  Moving all extremities without any difficulty    Sensory:  Intact to light touch throughout    Reflexes:  Not checked today    Coordination: Finger to nose intact bilaterally, no tremor noted    Gait: - Steady casual gait         Review of Systems:   Review of Systems  Constitutional: Negative  HENT: Negative  Eyes: Positive for photophobia  Blurred Vision    Respiratory: Negative  Cardiovascular: Negative  Gastrointestinal: Positive for nausea  Endocrine: Negative  Genitourinary: Negative  Musculoskeletal: Positive for neck pain  Skin: Negative  Allergic/Immunologic: Negative  Neurological: Positive for light-headedness and headaches  Hematological: Negative  Psychiatric/Behavioral: Positive for decreased concentration  The patient is nervous/anxious  I have spent 25 minutes with Patient  today in which greater than 50% of this time was spent in counseling/coordination of care regarding Diagnostic results, Prognosis, Risks and benefits of TX options,  instructions for management, Patient and family education, Importance of TX compliance, Risk factor reductions, Impressions and Plan of care as above           Author:  Maricruz Cornejo MD 9/17/2020 3:35 PM

## 2020-09-17 NOTE — PROGRESS NOTES
Review of Systems   Constitutional: Negative  HENT: Negative  Eyes: Positive for photophobia  Blurred Vision    Respiratory: Negative  Cardiovascular: Negative  Gastrointestinal: Positive for nausea  Endocrine: Negative  Genitourinary: Negative  Musculoskeletal: Positive for neck pain  Skin: Negative  Allergic/Immunologic: Negative  Neurological: Positive for light-headedness and headaches  Hematological: Negative  Psychiatric/Behavioral: Positive for decreased concentration  The patient is nervous/anxious

## 2020-09-17 NOTE — PATIENT INSTRUCTIONS
Vitamin-D deficiency (May 2019 -13): continue taking 2000 International Units on daily basis     Low B12 level last year-continue taking B12 500 micro g daily     Chronic migraine headaches:  Preventive therapy for headaches:   - Emgality 120mg 1 injection monthly  - Gabapentin 600 mg 3 times a day  Abortive therapy for headaches:   - Has tried Triptan in the past with no benefit  - Status post gastric bypass in February of 2019  - To help break her current headache will have her take Compazine (prochlorperazine) 10 mg 3 times a day for 3 days along with Benadryl 50 mg at bedtime  If this fails patient will consider hospital admission to help break her headache cycle       Headache management instructions  - When patient has a moderate to severe headache, they should seek rest, initiate relaxation and apply cold compresses to the head  - Maintain regular sleep schedule  Adults need at least 7-8 hours of uninterrupted a night  - Limit over the counter medications such as Tylenol, Ibuprofen, Aleve, Excedrin  (No more than 3 times a week)  - Maintain headache diary   We discussed an JEREMI for a smart phone is "Migraine nely"  - Limit caffeine to 1-2 cups 8 to 16 oz a day or less  - Avoid dietary trigger  (aged cheese, peanuts, MSG, aspartame and nitrates)  - Patient is to have regular frequent meals to prevent headache onset     - Please drink at least 64 ounces of water a day to help remain hydrated      Please call with any questions or concerns   Office number is [phone]

## 2020-09-21 ENCOUNTER — TELEPHONE (OUTPATIENT)
Dept: NEUROLOGY | Facility: CLINIC | Age: 55
End: 2020-09-21

## 2020-09-24 ENCOUNTER — TELEPHONE (OUTPATIENT)
Dept: NEUROLOGY | Facility: CLINIC | Age: 55
End: 2020-09-24

## 2020-09-24 NOTE — TELEPHONE ENCOUNTER
Received approval information from [facility] via Cover my meds  Approval letter has been printed and scanned into the patient's chart under "media" for future reference  Authorization information:    authorization #: [id number]  Valid dates: 9/24/2020 until 12/24/2020    Daphne,    Please contact the patient and schedule a New Start Botox appointment with Dr Constanza Parrish  It will be specialty pharmacy  Please let me know once the patient is scheduled so I can attach a referral  Per the patient Dr Constanza Parrish wanted the patient to have these injections as soon as possible  She is aware you will be giving her a call      Thank you    Amalia Miller

## 2020-09-24 NOTE — TELEPHONE ENCOUNTER
Dr Jamison Davalos,    Patient called in stating you were going to order Botox for her  I advised her that I do see Botox mention in your note but I did not receive a request to initiate prior authorization for Botox  I did take a look at her insurance and reviewed the process of Botox with her  I advised her that once I get the okay to move forward from Dr Jamison Davalos I will proceed with calling her insurance to verify if authorization is required  I advised her that we will need to use [facility]/ [place]  I explained the process of ordering Botox with the patient  Patient is questioning how long it will take to complete all this- I advised her that I will call her insurance today as soon as I get a response from Dr Jamison Davalos  I advised her that I cannot start the ordering process until she is scheduled  Patient states she suffered from a headache for 6 weeks and Dr Jamison Davalos wants her to have these injections as soon as possible  I advised her that I will send everything to [facility] as soon as the information is verified  I advised her that if Dr Jamison Davalos wants her brought in as soon as possible [facility] will communicate with Dr Jamison Davalos to schedule the patient  Patient verbally understood and is aware that she will receive a call from [facility] once the authorization process is completed       Please advise    Thank you    Jeffrey Tompkins

## 2020-09-24 NOTE — TELEPHONE ENCOUNTER
----- Message from Rosetta Webster MD sent at 9/24/2020  2:12 PM EDT -----  Patient needs Botox 200 units for her chronic migraine headache    Thank you

## 2020-09-24 NOTE — TELEPHONE ENCOUNTER
[facility]- spoke with  Juliana Licea- I advised her I was calling to see if prior authorization is required for Botox  I provided her with the following codes: 39047,   She states no authorization is required for either code  She advised me the medication can be obtained from [facility]/ [place] or can be a buy and bill  Call reference #: [id number]      Prior authorization submitted to [facility] via Cover my Meds  Will await approval/denial letter      Pending authorization #: [id number]

## 2020-09-29 DIAGNOSIS — G43.709 CHRONIC MIGRAINE WITHOUT AURA WITHOUT STATUS MIGRAINOSUS, NOT INTRACTABLE: Primary | ICD-10-CM

## 2020-09-29 NOTE — TELEPHONE ENCOUNTER
Received a call from the patient following up on scheduling her New Start Botox appointment as she has not yet heard from Rose  I advised her that I would see if Rose was available and transfer her to Rose to get scheduled  Spoke with Rose and call was transferred to be scheduled

## 2020-09-29 NOTE — TELEPHONE ENCOUNTER
Patient called in and I scheduled her for her New Start Botox 11/04/20 at 03:00pm in the [facility] Location with Dr Parminder Schwartz,   Please overbook Dr Parminder Zuñiga on the date above       Dann Arnoldo

## 2020-10-05 NOTE — TELEPHONE ENCOUNTER
Noted thank you- a referral has been attached to the patient's upcoming appointment with Dr Jean Hopkins on 11/4/2020 in the [facility] location  Type  Date  User  Summary  Attachment    General  10/05/2020 12:02 PM  Marysol Ansari  care coordination  -    Note     Botox- no authorization needed  Please use [facility]      *Patient is a New Start Botox*     Thank you,     Johny Brink

## 2020-10-05 NOTE — TELEPHONE ENCOUNTER
[address] [facility]- spoke with [place]- I advised her I was calling get the patient enrolled with the specialty pharmacy for Botox  She was unable to locate the patient- an account was created  I provided her with the patient's demographics and contact information  She was able to transfer me to a pharmacist to provide a verbal Rx  Spoke with [employer], pharmacist- provided a verbal Rx for Botox 200 units QTY: 1 under [facility] for Chronic Migraine with 3 refills  Will do a status check in 2 days         Account #: [de-identified]

## 2020-10-06 NOTE — TELEPHONE ENCOUNTER
[address]- spoke with Tianna Rocha- she states the patient's Botox order is ready to be scheduled for delivery  Patient's consent is not on file  I attempted to contact the patient while I had Akin Shah on the phone- patient did answer  The patient provided consent to ship  Botox to be delivered on Thursday 10/8/2020 via [employer] priority overnight- a signature will be required  Daphne,    Please await the patient's Botox order and document once it has arrived  Please let me know if you do not receive the patient's order      Thank you,    [facility]

## 2020-10-08 NOTE — TELEPHONE ENCOUNTER
Botox number of units: 200  Botox quantity: 1  Arrived at what location:   Lot number: [id number]  Expiration Date: 05/2023

## 2020-10-09 ENCOUNTER — TELEPHONE (OUTPATIENT)
Dept: NEUROLOGY | Facility: CLINIC | Age: 55
End: 2020-10-09

## 2020-11-04 ENCOUNTER — PROCEDURE VISIT (OUTPATIENT)
Dept: NEUROLOGY | Facility: CLINIC | Age: 55
End: 2020-11-04
Payer: COMMERCIAL

## 2020-11-04 VITALS — SYSTOLIC BLOOD PRESSURE: 132 MMHG | HEART RATE: 87 BPM | DIASTOLIC BLOOD PRESSURE: 74 MMHG | TEMPERATURE: 97 F

## 2020-11-04 DIAGNOSIS — G43.709 CHRONIC MIGRAINE WITHOUT AURA WITHOUT STATUS MIGRAINOSUS, NOT INTRACTABLE: Primary | ICD-10-CM

## 2020-11-04 PROCEDURE — 64615 CHEMODENERV MUSC MIGRAINE: CPT | Performed by: PSYCHIATRY & NEUROLOGY

## 2020-11-19 DIAGNOSIS — R50.9 FEVER, UNSPECIFIED FEVER CAUSE: ICD-10-CM

## 2020-11-19 DIAGNOSIS — R07.89 TIGHTNESS IN CHEST: ICD-10-CM

## 2020-11-19 PROCEDURE — U0003 INFECTIOUS AGENT DETECTION BY NUCLEIC ACID (DNA OR RNA); SEVERE ACUTE RESPIRATORY SYNDROME CORONAVIRUS 2 (SARS-COV-2) (CORONAVIRUS DISEASE [COVID-19]), AMPLIFIED PROBE TECHNIQUE, MAKING USE OF HIGH THROUGHPUT TECHNOLOGIES AS DESCRIBED BY CMS-2020-01-R: HCPCS | Performed by: FAMILY MEDICINE

## 2020-11-21 LAB — SARS-COV-2 RNA SPEC QL NAA+PROBE: NOT DETECTED

## 2020-11-30 ENCOUNTER — TELEPHONE (OUTPATIENT)
Dept: NEUROLOGY | Facility: CLINIC | Age: 55
End: 2020-11-30

## 2020-11-30 DIAGNOSIS — G43.709 CHRONIC MIGRAINE WITHOUT AURA WITHOUT STATUS MIGRAINOSUS, NOT INTRACTABLE: ICD-10-CM

## 2020-12-09 ENCOUNTER — HOSPITAL ENCOUNTER (OUTPATIENT)
Dept: RADIOLOGY | Facility: HOSPITAL | Age: 55
Discharge: HOME | End: 2020-12-09
Attending: FAMILY MEDICINE
Payer: COMMERCIAL

## 2020-12-09 ENCOUNTER — TRANSCRIBE ORDERS (OUTPATIENT)
Dept: ADMINISTRATIVE | Facility: HOSPITAL | Age: 55
End: 2020-12-09

## 2020-12-09 DIAGNOSIS — M25.559 PAIN IN JOINT INVOLVING PELVIC REGION AND THIGH, UNSPECIFIED LATERALITY: ICD-10-CM

## 2020-12-09 DIAGNOSIS — M25.559 PAIN IN JOINT INVOLVING PELVIC REGION AND THIGH, UNSPECIFIED LATERALITY: Primary | ICD-10-CM

## 2020-12-09 PROCEDURE — 73521 X-RAY EXAM HIPS BI 2 VIEWS: CPT

## 2020-12-14 ENCOUNTER — OFFICE VISIT (OUTPATIENT)
Dept: NEUROLOGY | Facility: CLINIC | Age: 55
End: 2020-12-14
Payer: COMMERCIAL

## 2020-12-14 VITALS
DIASTOLIC BLOOD PRESSURE: 59 MMHG | HEART RATE: 79 BPM | HEIGHT: 63 IN | BODY MASS INDEX: 36.02 KG/M2 | SYSTOLIC BLOOD PRESSURE: 130 MMHG | WEIGHT: 203.3 LBS

## 2020-12-14 DIAGNOSIS — E55.9 VITAMIN D DEFICIENCY: ICD-10-CM

## 2020-12-14 DIAGNOSIS — E53.8 B12 DEFICIENCY: Primary | ICD-10-CM

## 2020-12-14 DIAGNOSIS — G43.709 CHRONIC MIGRAINE WITHOUT AURA WITHOUT STATUS MIGRAINOSUS, NOT INTRACTABLE: ICD-10-CM

## 2020-12-14 PROCEDURE — 99214 OFFICE O/P EST MOD 30 MIN: CPT | Performed by: PHYSICIAN ASSISTANT

## 2020-12-14 RX ORDER — CYPROHEPTADINE HYDROCHLORIDE 4 MG/1
4 TABLET ORAL
Qty: 30 TABLET | Refills: 2 | Status: SHIPPED | OUTPATIENT
Start: 2020-12-14 | End: 2021-06-23

## 2020-12-14 RX ORDER — PROCHLORPERAZINE MALEATE 10 MG
TABLET ORAL
Qty: 20 TABLET | Refills: 3 | Status: SHIPPED | OUTPATIENT
Start: 2020-12-14 | End: 2021-06-23 | Stop reason: SDUPTHER

## 2020-12-29 ENCOUNTER — TELEPHONE (OUTPATIENT)
Dept: NEUROLOGY | Facility: CLINIC | Age: 55
End: 2020-12-29

## 2021-01-14 DIAGNOSIS — Z20.828 EXPOSURE TO SARS-ASSOCIATED CORONAVIRUS: ICD-10-CM

## 2021-01-14 DIAGNOSIS — R05.9 COUGH: ICD-10-CM

## 2021-01-14 PROCEDURE — U0003 INFECTIOUS AGENT DETECTION BY NUCLEIC ACID (DNA OR RNA); SEVERE ACUTE RESPIRATORY SYNDROME CORONAVIRUS 2 (SARS-COV-2) (CORONAVIRUS DISEASE [COVID-19]), AMPLIFIED PROBE TECHNIQUE, MAKING USE OF HIGH THROUGHPUT TECHNOLOGIES AS DESCRIBED BY CMS-2020-01-R: HCPCS

## 2021-01-14 PROCEDURE — U0005 INFEC AGEN DETEC AMPLI PROBE: HCPCS

## 2021-01-15 LAB — SARS-COV-2 RNA RESP QL NAA+PROBE: NEGATIVE

## 2021-01-19 DIAGNOSIS — K91.2 POSTSURGICAL MALABSORPTION: ICD-10-CM

## 2021-01-19 DIAGNOSIS — E66.01 MORBID OBESITY (HCC): Primary | ICD-10-CM

## 2021-01-19 DIAGNOSIS — Z98.84 BARIATRIC SURGERY STATUS: ICD-10-CM

## 2021-01-20 ENCOUNTER — TELEPHONE (OUTPATIENT)
Dept: NEUROLOGY | Facility: CLINIC | Age: 56
End: 2021-01-20

## 2021-01-20 NOTE — TELEPHONE ENCOUNTER
Did not contact patient during 2 week reminder calls, as we just spoke with patient last week regarding botox delivery  Will contact patient to complete COVID screening during 2 day confirmation calls

## 2021-02-02 ENCOUNTER — TELEPHONE (OUTPATIENT)
Dept: NEUROLOGY | Facility: CLINIC | Age: 56
End: 2021-02-02

## 2021-02-02 NOTE — TELEPHONE ENCOUNTER
Patient called in to reschedule missed botox appt due to weather  appt rescheduled as overbooking 2/10 @ 3:30p to accommodate patient

## 2021-02-11 ENCOUNTER — TELEPHONE (OUTPATIENT)
Dept: NEUROLOGY | Facility: CLINIC | Age: 56
End: 2021-02-11

## 2021-02-11 NOTE — TELEPHONE ENCOUNTER
Called patient and rescheduled her Botox to 02/24/21 at 09:30am      Erin Nelson,   Please re-attach referral      Thank you

## 2021-02-11 NOTE — TELEPHONE ENCOUNTER
Patient called in and left a voicemail stating that she was stuck in traffic yesterday 02/10/21 which is why she no showed to her Botox appointment  She is requesting a call back to reschedule appointment

## 2021-02-23 PROCEDURE — 64615 CHEMODENERV MUSC MIGRAINE: CPT | Performed by: PSYCHIATRY & NEUROLOGY

## 2021-02-23 NOTE — PROGRESS NOTES
Universal Protocol   Consent: Verbal consent obtained  Written consent obtained  Risks and benefits: risks, benefits and alternatives were discussed  Consent given by: patient  Time out: Immediately prior to procedure a "time out" was called to verify the correct patient, procedure, equipment, support staff and site/side marked as required  Patient understanding: patient states understanding of the procedure being performed  Patient consent: the patient's understanding of the procedure matches consent given  Procedure consent: procedure consent matches procedure scheduled  Relevant documents: relevant documents present and verified  Patient identity confirmed: verbally with patient        Chemodenervation     Date/Time 2/23/2021 2:03 PM     Performed by  Poli Parrish MD     Authorized by Poli Parrish MD        Pre-procedure details      Prepped With: Alcohol     Anesthesia  (see MAR for exact dosages): Anesthesia method:  None   Procedure details     Position:  Upright   Botox     Botox Type:  Type A    Brand:  Botox    mL's of Botulinum Toxin:  200    Final Concentration per CC:  50 units    Needle Gauge:  30 G 2 5 inch   Total Units     Total units used:  200    Total units discarded:  0   Post-procedure details      Chemodenervation:  Chronic migraine    Facial Nerve Location[de-identified]  Bilateral facial nerve    Patient tolerance of procedure:   Tolerated well, no immediate complications            L  injection amount:  5 unit(s)    R  injection amount:  5 unit(s)    Procerus injection amount:  5 unit(s)       L lateral frontalis:  5 unit(s)    R lateral frontalis:  5 unit(s)    L medial frontalis:  5 unit(s)    R medial frontalis:  5 unit(s)       L temporalis injection amount:  20 unit(s)    R temporalis injection amount:  20 unit(s)        L inferior occipitalis injection amount:  15 unit(s)    R inferior occipitalis injection amount:  15 unit(s)       L superior cervical paraspinal injection amount:  10 unit(s)    R superior cervical paraspinal injection amount:  10 unit(s)            L sternocleidomastoid injection amount:   5 unit(s)     left temporal frontal scalp region injection amount 40 units

## 2021-02-24 ENCOUNTER — PROCEDURE VISIT (OUTPATIENT)
Dept: NEUROLOGY | Facility: CLINIC | Age: 56
End: 2021-02-24
Payer: COMMERCIAL

## 2021-02-24 VITALS — TEMPERATURE: 97.2 F | SYSTOLIC BLOOD PRESSURE: 141 MMHG | HEART RATE: 91 BPM | DIASTOLIC BLOOD PRESSURE: 78 MMHG

## 2021-02-24 DIAGNOSIS — G43.709 CHRONIC MIGRAINE WITHOUT AURA WITHOUT STATUS MIGRAINOSUS, NOT INTRACTABLE: Primary | ICD-10-CM

## 2021-03-06 ENCOUNTER — APPOINTMENT (EMERGENCY)
Dept: RADIOLOGY | Facility: HOSPITAL | Age: 56
End: 2021-03-06
Payer: COMMERCIAL

## 2021-03-06 ENCOUNTER — HOSPITAL ENCOUNTER (EMERGENCY)
Facility: HOSPITAL | Age: 56
Discharge: HOME | End: 2021-03-06
Payer: COMMERCIAL

## 2021-03-06 VITALS
DIASTOLIC BLOOD PRESSURE: 99 MMHG | TEMPERATURE: 98.1 F | SYSTOLIC BLOOD PRESSURE: 148 MMHG | HEART RATE: 93 BPM | RESPIRATION RATE: 16 BRPM | OXYGEN SATURATION: 98 %

## 2021-03-06 DIAGNOSIS — S63.502A LEFT WRIST SPRAIN, INITIAL ENCOUNTER: Primary | ICD-10-CM

## 2021-03-06 PROCEDURE — 99283 EMERGENCY DEPT VISIT LOW MDM: CPT

## 2021-03-06 PROCEDURE — 73110 X-RAY EXAM OF WRIST: CPT

## 2021-03-06 PROCEDURE — 99284 EMERGENCY DEPT VISIT MOD MDM: CPT | Performed by: PHYSICIAN ASSISTANT

## 2021-03-06 RX ORDER — IBUPROFEN 600 MG/1
600 TABLET ORAL ONCE
Status: COMPLETED | OUTPATIENT
Start: 2021-03-06 | End: 2021-03-06

## 2021-03-06 RX ADMIN — IBUPROFEN 600 MG: 600 TABLET ORAL at 13:16

## 2021-03-06 NOTE — DISCHARGE INSTRUCTIONS
Rest your injury as much as possible  Ice the injury 20 minutes on 20 minutes off as needed for pain  Compress the area using an Ace wrap or compression bandage  Elevate the injury above the level of the heart as much as possible to help with swelling  Take ibuprofen 600 mg every 6 hours for the 1st 24-48 hours to help with pain and swelling  You can also take Tylenol in between there for continued pain relief  Please follow-up with the orthopedic group provided within 1-2 weeks if you continue to have pain  Return to the emergency department sooner if you have any new, worsening, or concerning symptoms

## 2021-03-06 NOTE — ED PROVIDER NOTES
History  Chief Complaint   Patient presents with   Alex Bones Fall     pt states tripped at home and hit face on side of deep freezer; no LOC; denies thinners; left wrist pain     70-year-old female comes in today complaining a mechanical fall at home just prior to arrival   She reports that she hit her left cheek against a deep freezer and tried to brace herself using her left hand  She now has pain to her left wrist along the radial aspect  She did not try anything prior to coming  She has an ice pack on upon my evaluation          Prior to Admission Medications   Prescriptions Last Dose Informant Patient Reported? Taking? B-D 3CC LUER-ARACELIS SYR 25GX1/2" 25G X 1-1/2" 3 ML MISC   No No   Sig: USE FOR TORADOL INTRAMUSCULAR INJECTIONS   BREO ELLIPTA   Yes No   Sig: Inhale 1 puff Daily   EMGALITY 120 MG/ML SOAJ   No No   Sig: Inject 1 Syringe as directed every 30 (thirty) days   FLUoxetine HCl (PROZAC PO)   Yes No   Sig: Take 1 tablet by mouth daily   PROAIR  (90 Base) MCG/ACT inhaler   Yes No   Sig: Inhale 2 puffs every 6 (six) hours as needed    Syringe/Needle, Disp, (SYRINGE 3CC/74IL8-7/4") 27G X 1-1/4" 3 ML MISC   No No   Sig: Use for Toradol intramuscular injections  Ubrogepant (UBRELVY) 100 MG tablet   No No   Sig: Take 1 tablet (100 mg total) by mouth as needed (migraine) Take 1 tab at onset of migraine  May repeat in 2 hours if needed    Limit of 200 mg a day   buPROPion (WELLBUTRIN) 75 mg tablet   Yes No   Sig: Take 75 mg by mouth daily   clonazePAM (KlonoPIN) 0 5 mg tablet   Yes No   Sig: Take 0 5 mg by mouth 2 (two) times a day as needed for seizures   cyproheptadine (PERIACTIN) 4 mg tablet   No No   Sig: Take 1 tablet (4 mg total) by mouth daily at bedtime as needed for allergies (headaches)   diltiazem (CARDIZEM) 90 mg tablet   Yes No   Sig: Take 1 tablet by mouth daily   gabapentin (NEURONTIN) 300 mg capsule   No No   Sig: TAKE 2 CAPSULES(600 MG) BY MOUTH THREE TIMES DAILY   ketorolac (TORADOL) 30 mg/mL injection   No No   Sig: INJECT 1-2 ML 'S IN THE MUSCLE ONCE PER 24 HOURS AS NEEDED FOR MIGRAINE  NO MORE THAN 2 INJECTIONS PER WEEK   prochlorperazine (COMPAZINE) 10 mg tablet   No No   Si tab in a m  Daily during the week of her headache      Facility-Administered Medications: None       Past Medical History:   Diagnosis Date    Asthma     Hypertension     Migraine     Rapid heart rate        Past Surgical History:   Procedure Laterality Date    CHOLECYSTECTOMY      GASTRIC BYPASS      HERNIA REPAIR      LUNG SURGERY         Family History   Problem Relation Age of Onset    Diabetes Father     Lung cancer Father      I have reviewed and agree with the history as documented  E-Cigarette/Vaping    E-Cigarette Use Never User      E-Cigarette/Vaping Substances    Nicotine No     THC No     CBD No     Flavoring No     Other No     Unknown No      Social History     Tobacco Use    Smoking status: Former Smoker     Quit date:      Years since quittin 2    Smokeless tobacco: Never Used   Substance Use Topics    Alcohol use: Not Currently    Drug use: Never       Review of Systems   Constitutional: Negative for fever  HENT: Negative for nosebleeds  Eyes: Negative for redness  Respiratory: Negative for shortness of breath  Cardiovascular: Negative for chest pain  Gastrointestinal: Negative for blood in stool  Genitourinary: Negative for hematuria  Musculoskeletal: Positive for arthralgias  Negative for gait problem  Skin: Negative for rash  Neurological: Negative for seizures  Psychiatric/Behavioral: Negative for behavioral problems  Physical Exam  Physical Exam  Constitutional:       Appearance: Normal appearance  Comments: Patient appears older than stated age   HENT:      Head: Normocephalic  Comments: Superficial abrasion to the left superior zygomatic arch  No periorbital tenderness to palpation    No TMJ locking or crepitus     Nose: No rhinorrhea  Mouth/Throat:      Mouth: Mucous membranes are moist    Eyes:      Extraocular Movements: Extraocular movements intact  Neck:      Musculoskeletal: Normal range of motion  Pulmonary:      Effort: Pulmonary effort is normal    Musculoskeletal:      Comments: Left upper extremity:  Patient has tenderness to palpation of the distal 1/3 of her radius  No snuffbox tenderness  No obvious deformity  Decreased flexion extension and abduction   Skin:     General: Skin is warm and dry  Neurological:      General: No focal deficit present  Mental Status: She is alert  Psychiatric:         Mood and Affect: Mood normal          Behavior: Behavior normal          Vital Signs  ED Triage Vitals   Temperature Pulse Respirations Blood Pressure SpO2   03/06/21 1252 03/06/21 1252 03/06/21 1252 03/06/21 1254 03/06/21 1252   98 1 °F (36 7 °C) 93 16 148/99 98 %      Temp src Heart Rate Source Patient Position - Orthostatic VS BP Location FiO2 (%)   -- 03/06/21 1252 03/06/21 1252 03/06/21 1252 --    Monitor Sitting Right arm       Pain Score       --                  Vitals:    03/06/21 1252 03/06/21 1254   BP:  148/99   Pulse: 93    Patient Position - Orthostatic VS: Sitting          Visual Acuity      ED Medications  Medications   ibuprofen (MOTRIN) tablet 600 mg (600 mg Oral Given 3/6/21 1316)       Diagnostic Studies  Results Reviewed     None                 XR wrist 3+ views LEFT   ED Interpretation by Luan Aschoff, PA-C (03/06 1358)   No acute bony process                 Procedures  Procedures         ED Course                             SBIRT 20yo+      Most Recent Value   SBIRT (23 yo +)   In order to provide better care to our patients, we are screening all of our patients for alcohol and drug use  Would it be okay to ask you these screening questions? Yes Filed at: 03/06/2021 3406   Initial Alcohol Screen: US AUDIT-C    1   How often do you have a drink containing alcohol?  0 Filed at: 03/06/2021 1337   2  How many drinks containing alcohol do you have on a typical day you are drinking? 0 Filed at: 03/06/2021 1337   3a  Male UNDER 65: How often do you have five or more drinks on one occasion? 0 Filed at: 03/06/2021 1337   3b  FEMALE Any Age, or MALE 65+: How often do you have 4 or more drinks on one occassion? 0 Filed at: 03/06/2021 1337   Audit-C Score  0 Filed at: 03/06/2021 1337   SAMUEL: How many times in the past year have you    Used an illegal drug or used a prescription medication for non-medical reasons? Never Filed at: 03/06/2021 1337                    MDM  Number of Diagnoses or Management Options  Left wrist sprain, initial encounter:   Diagnosis management comments: Ace wrap applied by me prior to discharge      Disposition  Final diagnoses:   Left wrist sprain, initial encounter     Time reflects when diagnosis was documented in both MDM as applicable and the Disposition within this note     Time User Action Codes Description Comment    3/6/2021  2:08 PM Maya Vaughn Add [[id number]] Left wrist sprain, initial encounter       ED Disposition     ED Disposition Condition Date/Time Comment    Discharge Stable Sat Mar 6, 2021  2:08 PM Jessica Keller discharge to home/self care              Follow-up Information     Follow up With Specialties Details Why Contact Info Additional Information    Flaquita Patterson, [facility] [address] [address] [facility] [place] [facility] Go in 1 week As needed [address] [phone] [address] [facility] [place], [facility] [place] [address] [place], [place], [address] [facility]          Discharge Medication List as of 3/6/2021  2:09 PM      CONTINUE these medications which have NOT CHANGED    Details   B-D 3CC LUER-ARACELIS SYR 25GX1/2" 25G X 1-1/2" 3 ML MISC USE FOR TORADOL INTRAMUSCULAR INJECTIONS, Normal      BREO ELLIPTA Inhale 1 puff Daily, Historical Med      buPROPion (WELLBUTRIN) 75 mg tablet Take 75 mg by mouth daily, Historical Med      clonazePAM (KlonoPIN) 0 5 mg tablet Take 0 5 mg by mouth 2 (two) times a day as needed for seizures, Historical Med      cyproheptadine (PERIACTIN) 4 mg tablet Take 1 tablet (4 mg total) by mouth daily at bedtime as needed for allergies (headaches), Starting Mon 12/14/2020, Normal      diltiazem (CARDIZEM) 90 mg tablet Take 1 tablet by mouth daily, Starting Thu 3/14/2019, Historical Med      EMGALITY 120 MG/ML SOAJ Inject 1 Syringe as directed every 30 (thirty) days, Starting Thu 4/30/2020, Normal      FLUoxetine HCl (PROZAC PO) Take 1 tablet by mouth daily, Historical Med      gabapentin (NEURONTIN) 300 mg capsule TAKE 2 CAPSULES(600 MG) BY MOUTH THREE TIMES DAILY, Normal      ketorolac (TORADOL) 30 mg/mL injection INJECT 1-2 ML 'S IN THE MUSCLE ONCE PER 24 HOURS AS NEEDED FOR MIGRAINE  NO MORE THAN 2 INJECTIONS PER WEEK, Normal      PROAIR  (90 Base) MCG/ACT inhaler Inhale 2 puffs every 6 (six) hours as needed , Starting Wed 4/4/2018, Historical Med      prochlorperazine (COMPAZINE) 10 mg tablet 1 tab in a m  Daily during the week of her headache, Normal      Syringe/Needle, Disp, (SYRINGE 3CC/31MW7-5/4") 27G X 1-1/4" 3 ML MISC Use for Toradol intramuscular injections  , Normal      Ubrogepant (UBRELVY) 100 MG tablet Take 1 tablet (100 mg total) by mouth as needed (migraine) Take 1 tab at onset of migraine  May repeat in 2 hours if needed  Limit of 200 mg a day, Starting Mon 12/14/2020, Normal           No discharge procedures on file      PDMP Review     None          ED Provider  Electronically Signed by           Herb Simpson PA-C  03/06/21 4367

## 2021-03-10 DIAGNOSIS — Z23 ENCOUNTER FOR IMMUNIZATION: ICD-10-CM

## 2021-03-11 ENCOUNTER — TELEPHONE (OUTPATIENT)
Dept: SURGERY | Facility: CLINIC | Age: 56
End: 2021-03-11

## 2021-03-19 ENCOUNTER — APPOINTMENT (OUTPATIENT)
Dept: LAB | Facility: HOSPITAL | Age: 56
End: 2021-03-19
Attending: FAMILY MEDICINE
Payer: COMMERCIAL

## 2021-03-19 ENCOUNTER — TRANSCRIBE ORDERS (OUTPATIENT)
Dept: ADMINISTRATIVE | Facility: HOSPITAL | Age: 56
End: 2021-03-19

## 2021-03-19 DIAGNOSIS — R73.01 IMPAIRED FASTING GLUCOSE: ICD-10-CM

## 2021-03-19 DIAGNOSIS — I10 HYPERTENSION, ESSENTIAL: ICD-10-CM

## 2021-03-19 DIAGNOSIS — E78.5 HYPERLIPIDEMIA, UNSPECIFIED HYPERLIPIDEMIA TYPE: ICD-10-CM

## 2021-03-19 DIAGNOSIS — F32.2 SEVERE MAJOR DEPRESSION WITHOUT PSYCHOTIC FEATURES (HCC): ICD-10-CM

## 2021-03-19 DIAGNOSIS — I10 HYPERTENSION, ESSENTIAL: Primary | ICD-10-CM

## 2021-03-19 LAB
ALBUMIN SERPL BCP-MCNC: 4 G/DL (ref 3.4–4.8)
ALP SERPL-CCNC: 102 U/L (ref 35–140)
ALT SERPL W P-5'-P-CCNC: 14 U/L (ref 5–54)
ANION GAP SERPL CALCULATED.3IONS-SCNC: 8 MMOL/L (ref 4–13)
AST SERPL W P-5'-P-CCNC: 15 U/L (ref 15–41)
BASOPHILS # BLD AUTO: 0.04 THOUSANDS/ΜL (ref 0–0.1)
BASOPHILS NFR BLD AUTO: 1 % (ref 0–1)
BILIRUB SERPL-MCNC: 0.3 MG/DL (ref 0.3–1.2)
BUN SERPL-MCNC: 12 MG/DL (ref 6–20)
CALCIUM SERPL-MCNC: 8.8 MG/DL (ref 8.4–10.2)
CHLORIDE SERPL-SCNC: 107 MMOL/L (ref 96–108)
CHOLEST SERPL-MCNC: 207 MG/DL
CO2 SERPL-SCNC: 27 MMOL/L (ref 22–33)
CREAT SERPL-MCNC: 0.98 MG/DL (ref 0.4–1.1)
EOSINOPHIL # BLD AUTO: 0.35 THOUSAND/ΜL (ref 0–0.61)
EOSINOPHIL NFR BLD AUTO: 5 % (ref 0–6)
ERYTHROCYTE [DISTWIDTH] IN BLOOD BY AUTOMATED COUNT: 16.3 % (ref 11.6–15.1)
EST. AVERAGE GLUCOSE BLD GHB EST-MCNC: 111 MG/DL
GFR SERPL CREATININE-BSD FRML MDRD: 66 ML/MIN/1.73SQ M
GLUCOSE P FAST SERPL-MCNC: 110 MG/DL (ref 70–105)
HBA1C MFR BLD: 5.5 %
HCT VFR BLD AUTO: 41.6 % (ref 34.8–46.1)
HDLC SERPL-MCNC: 51 MG/DL
HGB BLD-MCNC: 13 G/DL (ref 11.5–15.4)
IMM GRANULOCYTES # BLD AUTO: 0.03 THOUSAND/UL (ref 0–0.2)
IMM GRANULOCYTES NFR BLD AUTO: 0 % (ref 0–2)
LDLC SERPL CALC-MCNC: 128 MG/DL (ref 0–100)
LYMPHOCYTES # BLD AUTO: 1.88 THOUSANDS/ΜL (ref 0.6–4.47)
LYMPHOCYTES NFR BLD AUTO: 28 % (ref 14–44)
MCH RBC QN AUTO: 24.5 PG (ref 26.8–34.3)
MCHC RBC AUTO-ENTMCNC: 31.3 G/DL (ref 31.4–37.4)
MCV RBC AUTO: 78 FL (ref 82–98)
MONOCYTES # BLD AUTO: 0.44 THOUSAND/ΜL (ref 0.17–1.22)
MONOCYTES NFR BLD AUTO: 6 % (ref 4–12)
NEUTROPHILS # BLD AUTO: 4.1 THOUSANDS/ΜL (ref 1.85–7.62)
NEUTS SEG NFR BLD AUTO: 60 % (ref 43–75)
NONHDLC SERPL-MCNC: 156 MG/DL
PLATELET # BLD AUTO: 396 THOUSANDS/UL (ref 149–390)
PMV BLD AUTO: 9.9 FL (ref 8.9–12.7)
POTASSIUM SERPL-SCNC: 3.9 MMOL/L (ref 3.5–5)
PROT SERPL-MCNC: 6.7 G/DL (ref 6.4–8.3)
RBC # BLD AUTO: 5.31 MILLION/UL (ref 3.81–5.12)
SODIUM SERPL-SCNC: 142 MMOL/L (ref 133–145)
TRIGL SERPL-MCNC: 140.1 MG/DL
TSH SERPL DL<=0.05 MIU/L-ACNC: 4.05 UIU/ML (ref 0.34–5.6)
WBC # BLD AUTO: 6.84 THOUSAND/UL (ref 4.31–10.16)

## 2021-03-19 PROCEDURE — 80061 LIPID PANEL: CPT

## 2021-03-19 PROCEDURE — 36415 COLL VENOUS BLD VENIPUNCTURE: CPT

## 2021-03-19 PROCEDURE — 84443 ASSAY THYROID STIM HORMONE: CPT

## 2021-03-19 PROCEDURE — 83036 HEMOGLOBIN GLYCOSYLATED A1C: CPT

## 2021-03-19 PROCEDURE — 85025 COMPLETE CBC W/AUTO DIFF WBC: CPT

## 2021-03-19 PROCEDURE — 80053 COMPREHEN METABOLIC PANEL: CPT

## 2021-03-24 ENCOUNTER — TRANSCRIBE ORDERS (OUTPATIENT)
Dept: ADMINISTRATIVE | Facility: HOSPITAL | Age: 56
End: 2021-03-24

## 2021-03-24 ENCOUNTER — APPOINTMENT (OUTPATIENT)
Dept: LAB | Facility: HOSPITAL | Age: 56
End: 2021-03-24
Attending: FAMILY MEDICINE
Payer: COMMERCIAL

## 2021-03-24 DIAGNOSIS — D64.9 ANEMIA, UNSPECIFIED TYPE: Primary | ICD-10-CM

## 2021-03-24 DIAGNOSIS — D64.9 ANEMIA, UNSPECIFIED TYPE: ICD-10-CM

## 2021-03-24 LAB
FERRITIN SERPL-MCNC: 8 NG/ML (ref 8–388)
FOLATE SERPL-MCNC: 10.1 NG/ML (ref 3.1–17.5)
IRON SATN MFR SERPL: 11 %
IRON SERPL-MCNC: 51 UG/DL (ref 50–170)
TIBC SERPL-MCNC: 468 UG/DL (ref 250–450)
VIT B12 SERPL-MCNC: 239 PG/ML (ref 100–900)

## 2021-03-24 PROCEDURE — 82607 VITAMIN B-12: CPT

## 2021-03-24 PROCEDURE — 36415 COLL VENOUS BLD VENIPUNCTURE: CPT

## 2021-03-24 PROCEDURE — 82746 ASSAY OF FOLIC ACID SERUM: CPT

## 2021-03-24 PROCEDURE — 82728 ASSAY OF FERRITIN: CPT

## 2021-03-24 PROCEDURE — 83540 ASSAY OF IRON: CPT

## 2021-03-24 PROCEDURE — 83550 IRON BINDING TEST: CPT

## 2021-03-26 ENCOUNTER — TRANSCRIBE ORDERS (OUTPATIENT)
Dept: ADMINISTRATIVE | Facility: HOSPITAL | Age: 56
End: 2021-03-26

## 2021-03-26 DIAGNOSIS — Z12.31 OTHER SCREENING MAMMOGRAM: Primary | ICD-10-CM

## 2021-04-06 ENCOUNTER — TELEPHONE (OUTPATIENT)
Dept: NEUROLOGY | Facility: CLINIC | Age: 56
End: 2021-04-06

## 2021-04-06 NOTE — TELEPHONE ENCOUNTER
Patient is scheduled 5/27/2021 with Janelle Plata in the [facility]

## 2021-04-06 NOTE — TELEPHONE ENCOUNTER
Type Date User Summary Attachment   General 04/05/2021  2:44 PM Marysol Ansari care coordination  -   Note    Botox- no authorization needed   Please use [facility]      Thank you,     Johny Brink

## 2021-04-26 ENCOUNTER — TELEPHONE (OUTPATIENT)
Dept: NEUROLOGY | Facility: CLINIC | Age: 56
End: 2021-04-26

## 2021-04-27 DIAGNOSIS — G43.709 CHRONIC MIGRAINE WITHOUT AURA WITHOUT STATUS MIGRAINOSUS, NOT INTRACTABLE: ICD-10-CM

## 2021-04-27 RX ORDER — GABAPENTIN 300 MG/1
CAPSULE ORAL
Qty: 540 CAPSULE | Refills: 0 | Status: SHIPPED | OUTPATIENT
Start: 2021-04-27 | End: 2021-09-08

## 2021-04-28 NOTE — TELEPHONE ENCOUNTER
Appeal for pharmacy benefit Botox authorization has been approved:    Authorization information:     Authorization #: [id number]  Valid dates: 4/14/2021 until 7/28/2021

## 2021-05-03 NOTE — TELEPHONE ENCOUNTER
[address]  [facility] spoke with Madhu Whatley, advised I was calling to cancel a prescription on file for botox  Advised that patient is no longer interested in medication at this time  Madhu Whatley was able to cancel patient's prescription as of today 5/3/2021

## 2021-05-03 NOTE — TELEPHONE ENCOUNTER
Called and spoke with the patient- I advised her that her Botox order ready to be scheduled for delivery  She advised me that the Botox is not working and she does not wish to continue with it at this time  She is requesting I cancel her order  I advised her that I would call and cancel her Botox order  I advised the patient that I would make Ed Xiao aware as well  Patient would like to schedule a follow-up to discuss other options besides Botox  I advised her that Delmar MATHIS will be giving her a call since we will not be able to convert her 5/27 appointment to a follow-up  Patient is aware and verbally understood  Will call to cancel order

## 2021-05-03 NOTE — TELEPHONE ENCOUNTER
[address]  [facility]- spoke with Mariajose Naranjo- I advised her I was calling to initiate a refill request for the patient's Botox order  Refill request initiated  She advised me that the patient has a balance of $290 00  Patient has a  co-pay of $[phone]  She is going to attempt to reach out to the patient while she has me on the phone- the patient did not answer  Will reach out to the patient on my end as well

## 2021-05-10 NOTE — TELEPHONE ENCOUNTER
Called patient and I scheduled a follow up for 06/03/21 at 07:30am in the  with Moiz Bacon to discuss other options

## 2021-05-28 DIAGNOSIS — G43.709 CHRONIC MIGRAINE WITHOUT AURA WITHOUT STATUS MIGRAINOSUS, NOT INTRACTABLE: ICD-10-CM

## 2021-05-28 RX ORDER — GALCANEZUMAB 120 MG/ML
INJECTION, SOLUTION SUBCUTANEOUS
Qty: 1 ML | Refills: 11 | Status: SHIPPED | OUTPATIENT
Start: 2021-05-28 | End: 2021-06-23

## 2021-06-21 ENCOUNTER — TELEPHONE (OUTPATIENT)
Dept: NEUROLOGY | Facility: CLINIC | Age: 56
End: 2021-06-21

## 2021-06-21 NOTE — TELEPHONE ENCOUNTER
ADD ON - patient is scheduled with Flores Berg on 6/23 @ 10:30 in [facility] - insurance is sstill in force

## 2021-06-23 ENCOUNTER — OFFICE VISIT (OUTPATIENT)
Dept: NEUROLOGY | Facility: CLINIC | Age: 56
End: 2021-06-23
Payer: COMMERCIAL

## 2021-06-23 VITALS
TEMPERATURE: 97 F | HEART RATE: 80 BPM | DIASTOLIC BLOOD PRESSURE: 88 MMHG | SYSTOLIC BLOOD PRESSURE: 128 MMHG | WEIGHT: 214.9 LBS | BODY MASS INDEX: 38.08 KG/M2 | HEIGHT: 63 IN

## 2021-06-23 DIAGNOSIS — G43.709 CHRONIC MIGRAINE WITHOUT AURA WITHOUT STATUS MIGRAINOSUS, NOT INTRACTABLE: ICD-10-CM

## 2021-06-23 DIAGNOSIS — G43.711 INTRACTABLE CHRONIC MIGRAINE WITHOUT AURA AND WITH STATUS MIGRAINOSUS: Primary | ICD-10-CM

## 2021-06-23 PROCEDURE — 96372 THER/PROPH/DIAG INJ SC/IM: CPT | Performed by: PHYSICIAN ASSISTANT

## 2021-06-23 PROCEDURE — 99215 OFFICE O/P EST HI 40 MIN: CPT | Performed by: PHYSICIAN ASSISTANT

## 2021-06-23 RX ORDER — PROCHLORPERAZINE MALEATE 10 MG
10 TABLET ORAL EVERY 8 HOURS PRN
Qty: 20 TABLET | Refills: 3 | Status: SHIPPED | OUTPATIENT
Start: 2021-06-23 | End: 2021-09-08 | Stop reason: SDUPTHER

## 2021-06-23 RX ORDER — PROCHLORPERAZINE EDISYLATE 5 MG/ML
10 INJECTION INTRAMUSCULAR; INTRAVENOUS ONCE
Status: COMPLETED | OUTPATIENT
Start: 2021-06-23 | End: 2021-06-23

## 2021-06-23 RX ORDER — KETOROLAC TROMETHAMINE 30 MG/ML
60 INJECTION, SOLUTION INTRAMUSCULAR; INTRAVENOUS ONCE
Status: COMPLETED | OUTPATIENT
Start: 2021-06-23 | End: 2021-06-23

## 2021-06-23 RX ORDER — LAMOTRIGINE 25 MG/1
TABLET ORAL
Qty: 120 TABLET | Refills: 6 | Status: SHIPPED | OUTPATIENT
Start: 2021-06-23 | End: 2022-01-26

## 2021-06-23 RX ORDER — RIMEGEPANT SULFATE 75 MG/75MG
75 TABLET, ORALLY DISINTEGRATING ORAL AS NEEDED
Qty: 8 TABLET | Refills: 3 | Status: SHIPPED | OUTPATIENT
Start: 2021-06-23

## 2021-06-23 RX ORDER — CYPROHEPTADINE HYDROCHLORIDE 4 MG/1
4 TABLET ORAL
Qty: 30 TABLET | Refills: 0 | Status: SHIPPED | OUTPATIENT
Start: 2021-06-23 | End: 2021-07-19

## 2021-06-23 RX ADMIN — KETOROLAC TROMETHAMINE 60 MG: 30 INJECTION, SOLUTION INTRAMUSCULAR; INTRAVENOUS at 10:38

## 2021-06-23 RX ADMIN — PROCHLORPERAZINE EDISYLATE 10 MG: 5 INJECTION INTRAMUSCULAR; INTRAVENOUS at 10:39

## 2021-06-23 NOTE — PROGRESS NOTES
[facility]  PATIENT:  Claudine Burgos  MRN:  [id number]  :  1965  DATE OF SERVICE:  2021      Assessment/Plan:     Chronic migraine without aura  Preventive therapy for headaches:   - Gabapentin 600 mg 3 times a day  - cyproheptadine 4 mg at bedtime (for at least 30 days)  -  We will attempt to obtain Aimovig 140 mg once a month through [employer] savings program  Lamictal 1 tab at bedtime for 7 nights, 1 tab twice a day for 7 days, 1 tab in am and 2 tabs bedtime for 7 days then 2 tabs twice a day  Lamictal Education: reinforced watching for itch, rash, or burning sensation  If noted, stop Lamictal, take Benadryl, call office, or proceed to the ER if worsens Danya Radha Syndrome)  Reminded to not stop Lamictal abruptly  If you miss more than 4-5 days of the medication, you will need to return to the low dose of 25 mg , and titrate up slowly  ;  Cyproheptadine 4 mg for 14 nights then hold for as needed with weather headaches  Abortive therapy for headaches:   - At onset of migraine, take Nurtec 75 mg  Limit of 1 tab in 24 hours  - Status post gastric bypass in 2019  - To help break her current headache will have her take Compazine (prochlorperazine) 10 mg 3 times a day for 3 days along with Benadryl 50 mg at bedtime       Call if you want to set up headache infusions as outpatient         Problem List Items Addressed This Visit        Cardiovascular and Mediastinum    Chronic migraine without aura - Primary     Preventive therapy for headaches:   - Gabapentin 600 mg 3 times a day  - cyproheptadine 4 mg at bedtime (for at least 30 days)  -  We will attempt to obtain Aimovig 140 mg once a month through [employer] program  Lamictal 1 tab at bedtime for 7 nights, 1 tab twice a day for 7 days, 1 tab in am and 2 tabs bedtime for 7 days then 2 tabs twice a day  Lamictal Education: reinforced watching for itch, rash, or burning sensation   If noted, stop Lamictal, take Benadryl, call office, or proceed to the ER if worsens Christian Natalee Syndrome)  Reminded to not stop Lamictal abruptly  If you miss more than 4-5 days of the medication, you will need to return to the low dose of 25 mg , and titrate up slowly  ;  Cyproheptadine 4 mg for 14 nights then hold for as needed with weather headaches  Abortive therapy for headaches:   - At onset of migraine, take Nurtec 75 mg  Limit of 1 tab in 24 hours  - Status post gastric bypass in February of 2019  - To help break her current headache will have her take Compazine (prochlorperazine) 10 mg 3 times a day for 3 days along with Benadryl 50 mg at bedtime       Call if you want to set up headache infusions as outpatient         Relevant Medications    ketorolac (TORADOL) 60 mg/2 mL IM injection 60 mg (Completed)    prochlorperazine (COMPAZINE) injection 10 mg (Completed)    lamoTRIgine (LaMICtal) 25 mg tablet    Rimegepant Sulfate (Nurtec) 75 MG TBDP    cyproheptadine (PERIACTIN) 4 mg tablet    prochlorperazine (COMPAZINE) 10 mg tablet              History of Present Illness: We had the pleasure of evaluating Dennie Dam in neurological follow up  today for headaches  As you know,  she is a 48 y o   left handed female  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    PREVENTATIVE:  - vitamin-D 2,  - Botox -2018, restarted 11/4/2020  - Emgality 120 mg (started-January 2020),  - gabapentin 300 mg 2 capsules, Depakote (for 5 days but did not help), Topamax (not effective)  - amlodipine 10 mg, diltiazem, propanololNur  - olanzapine 5 mg (does not help either),   - Cymbalta 60 mg, nortriptyline, protriptyline  - cyproheptadine  ABORTIVE:  - Decadron 2 mg (Gastric bypass)  - Advil, Aleve, Toradol injection (work the best),  - Midrin, Fioricet  - Reglan 10 mg, Compazine 10 mg,   - Imitrex 100 mg (heart palp and in hospital for 4 days), Maxalt 10 mg   - Vivienne Pierce      What is your current pain level - 9/10     How often do the headaches occur?   Daily  for past month  4 to 10/10 daily      Are you ever headache free? Not for past month  Aura/warning and how long does it last - none     What time of the day do the headaches start? Typically wakes up with them     How long do the headaches last? It will last one day but can last up to weeks     Describe your usual headache - Throbbing, Pounding, Pressure,      Where is your headache located? Typically left side of her head and at time on the right and that is when it is really severe       What is the intensity of pain? 4 to 10/10; average 7/10      Associated symptoms:   · Decrease of appetite, nausea, vomiting, diarrhea  · Photophobia, phonophobia, sensitivity to smell   · Problem with concentration, forgets what she is talking about  · light-headed or dizzy, stiff or sore neck,   · Hands or feet tingle or feel numb, prefer to be alone and in a dark room, unable to work     Number of days missed per month because of headaches:  Work (or school) days: 2 times already this month could not take care of her grandchildren  Social or Family activities: yes     Alternative therapies used in the past for headaches? heating pad  Headache are worse if the patient: cough, sneeze  Headache triggers: Food ( MSG ), Stress, weather change, lack of sleep, neck pain     What time of the year do headaches occur more frequently? fall  Have you seen someone else for headaches or pain? No  Have you had trigger point injection performed and how often? Yes  Have you had Botox injection performed and how often? Yes   Have you had epidural injections or transforaminal injections performed? No     Have you used CBD or THC for your headaches and how often? No     Have you ever had any Brain imaging? yes      MRI brain 2015  IMPRESSION-   1   Several small scattered white matter lesions within the cerebral hemispheres may represent precocious chronic microvascular ischemic disease   These are unchanged   Correlate for history of hypertension, diabetes or hypercholesterolemia  2   No evidence of mass, hemorrhage or acute ischemia         06/17/2019-MRI of the brain  Stable MRI of the brain, redemonstrating nonspecific cerebral white matter foci of signal abnormality which are in keeping with the patient's reported history of migraines   No pathologic intracranial enhancement    Probable secretions within the posterior left nasopharynx   Recommend direct inspection to exclude a mucosal abnormality   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        I personally reviewed these images      Reviewed old notes from physician seen in the past- see above HPI for summary of previous encounters           Past Medical History:   Diagnosis Date    Asthma     Hypertension     Migraine     Rapid heart rate        Patient Active Problem List   Diagnosis    Chronic migraine without aura    Peripheral neuropathy    B12 deficiency    Vitamin D deficiency       Medications:      Current Outpatient Medications   Medication Sig Dispense Refill    B-D 3CC LUER-ARACELIS SYR 25GX1/2" 25G X 1-1/2" 3 ML MISC USE FOR TORADOL INTRAMUSCULAR INJECTIONS 6 each 2    BREO ELLIPTA Inhale 1 puff Daily  5    clonazePAM (KlonoPIN) 0 5 mg tablet Take 0 5 mg by mouth 2 (two) times a day       FLUoxetine HCl (PROZAC PO) Take 2 tablets by mouth daily       gabapentin (NEURONTIN) 300 mg capsule TAKE 2 CAPSULES(600 MG) BY MOUTH THREE TIMES DAILY 540 capsule 0    ketorolac (TORADOL) 30 mg/mL injection INJECT 1-2 ML 'S IN THE MUSCLE ONCE PER 24 HOURS AS NEEDED FOR MIGRAINE  NO MORE THAN 2 INJECTIONS PER WEEK 6 mL 0    Syringe/Needle, Disp, (SYRINGE 3CC/97HC2-4/4") 27G X 1-" 3 ML MISC Use for Toradol intramuscular injections  3 each 2    cyproheptadine (PERIACTIN) 4 mg tablet Take 1 tablet (4 mg total) by mouth daily at bedtime as needed for allergies (headaches) 30 tablet 0    lamoTRIgine (LaMICtal) 25 mg tablet 1 tab at bedtime for 7 nights, 1 tab twice a day for 7 days, 1 tab in am and 2 tabs bedtime for 7 days then 2 tabs twice a day 120 tablet 6    PROAIR  (90 Base) MCG/ACT inhaler Inhale 2 puffs every 6 (six) hours as needed  (Patient not taking: Reported on 2021)  1    prochlorperazine (COMPAZINE) 10 mg tablet Take 1 tablet (10 mg total) by mouth every 8 (eight) hours as needed for nausea or vomiting (migraine) 20 tablet 3    Rimegepant Sulfate (Nurtec) 75 MG TBDP Take 75 mg by mouth as needed (migraine) Limit of 1 in 24 hours 8 tablet 3     No current facility-administered medications for this visit  Allergies:       Allergies   Allergen Reactions    Latex     Other      [facility] - [id number]: STEROIDS    Penicillins        Family History:     Family History   Problem Relation Age of Onset    Diabetes Father     Lung cancer Father        Social History:     Social History     Socioeconomic History    Marital status: /Civil Union     Spouse name: Not on file    Number of children: Not on file    Years of education: Not on file    Highest education level: Not on file   Occupational History    Not on file   Tobacco Use    Smoking status: Former Smoker     Quit date:      Years since quittin 4    Smokeless tobacco: Never Used   Vaping Use    Vaping Use: Never used   Substance and Sexual Activity    Alcohol use: Not Currently    Drug use: Never    Sexual activity: Not on file   Other Topics Concern    Not on file   Social History Narrative    Not on file     Social Determinants of Health Financial Resource Strain:     Difficulty of Paying Living Expenses:    Food Insecurity:     Worried About Running Out of Food in the Last Year:     [address] in the Last Year:    Transportation Needs:     Lack of Transportation (Medical):  Lack of Transportation (Non-Medical):    Physical Activity:     Days of Exercise per Week:     Minutes of Exercise per Session:    Stress:     Feeling of Stress :    Social Connections:     Frequency of Communication with Friends and Family:     Frequency of Social Gatherings with Friends and Family:     Attends Latter day Services:     Active Member of Clubs or Organizations:     Attends Club or Organization Meetings:     Marital Status:    Intimate Partner Violence:     Fear of Current or Ex-Partner:     Emotionally Abused:     Physically Abused:     Sexually Abused:       I have reviewed the patient's medical, social and surgical history as well as medications in detail and updated the computerized patient record  Objective:   Physical Exam:                                                                   Vitals:            /88 (BP Location: Left arm, Patient Position: Sitting, Cuff Size: Large)   Pulse 80   Temp (!) 97 °F (36 1 °C) (Tympanic)   Ht 5' 2 5" (1 588 m)   Wt 97 5 kg (214 lb 14 4 oz)   BMI 38 68 kg/m²   BP Readings from Last 3 Encounters:   06/23/21 128/88   03/06/21 148/99   02/24/21 141/78     Pulse Readings from Last 3 Encounters:   06/23/21 80   03/06/21 93   02/24/21 91          CONSTITUTIONAL: Well developed, well nourished, well groomed  No dysmorphic features  Eyes:  EOM normal      Neck:  Normal ROM, neck supple  HEENT:  Normocephalic atraumatic  Chest:  Respirations regular and unlabored  Psychiatric:  Normal behavior and appropriate affect      MENTAL STATUS  Orientation: Alert and oriented x 3  Fund of knowledge: Intact      MOTOR (Upper and lower extremities)   Bulk/tone/abnormal movement: Normal muscle bulk and tone  COORDINATION   Station/Gait: Normal baseline gait  Review of Systems:   Review of Systems  Constitutional: Negative  HENT: Negative  Eyes: Negative  Respiratory: Negative  Cardiovascular: Negative  Gastrointestinal: Negative  Endocrine: Negative  Genitourinary: Negative  Musculoskeletal: Negative  Skin: Negative  Allergic/Immunologic: Negative  Neurological: Positive for headaches  Hematological: Negative  Psychiatric/Behavioral: Negative        I personally reviewed the ROS entered by the MA    I spent 30 minutes in face-to-face discussion regarding  the pathophysiology of her current symptoms and further plan, as well as counseling, educating, and coordinating the patient's care including prognosis of diagnosis, diagnostic results, impression, and recommendations, risks and benefits of treatment, instructions for disease self management and treatment instructions and spent 15 minutes non-face to face    Author:  Jody Guillaume PA-C 6/23/2021 11:40 AM

## 2021-06-23 NOTE — PATIENT INSTRUCTIONS
Chronic migraine headaches:  Preventive therapy for headaches:   - Gabapentin 600 mg 3 times a day  - cyproheptadine 4 mg at bedtime (for at least 30 days)  -  We will attempt to obtain Aimovig 140 mg once a month through [employer] program  1 tab at bedtime for 7 nights, 1 tab twice a day for 7 days, 1 tab in am and 2 tabs bedtime for 7 days then 2 tabs twice a day  Lamictal Education: reinforced watching for itch, rash, or burning sensation  If noted, stop Lamictal, take Benadryl, call office, or proceed to the ER if worsens Daniela Corona Syndrome)  Reminded to not stop Lamictal abruptly  If you miss more than 4-5 days of the medication, you will need to return to the low dose of 25 mg , and titrate up slowly  ;  Cyproheptadine 4 mg for 14 nights then hold for as needed with weather headaches  Abortive therapy for headaches:   - At onset of migraine, take Nurtec 75 mg  Limit of 1 tab in 24 hours  - Status post gastric bypass in February of 2019  - To help break her current headache will have her take Compazine (prochlorperazine) 10 mg 3 times a day for 3 days along with Benadryl 50 mg at bedtime       Call if you want to set up headache infusions as outpatient     Headache management instructions  - When patient has a moderate to severe headache, they should seek rest, initiate relaxation and apply cold compresses to the head  - Maintain regular sleep schedule  Adults need at least 7-8 hours of uninterrupted a night  - Limit over the counter medications such as Tylenol, Ibuprofen, Aleve, Excedrin  (No more than 3 times a week)  - Maintain headache diary   We discussed an JEREMI for a smart phone is "Migraine buddy"  - Limit caffeine to 1-2 cups 8 to 16 oz a day or less  - Avoid dietary trigger  (aged cheese, peanuts, MSG, aspartame and nitrates)    - Patient is to have regular frequent meals to prevent headache onset     - Please drink at least 64 ounces of water a day to help remain hydrated      Please call with any questions or concerns   Office number is [phone]

## 2021-06-24 ENCOUNTER — TELEPHONE (OUTPATIENT)
Dept: NEUROLOGY | Facility: CLINIC | Age: 56
End: 2021-06-24

## 2021-06-24 NOTE — TELEPHONE ENCOUNTER
received [facility] PA request from pharm  called pharm for insurnace info  [id number]  [id number]  [facility]  [id number]  [phone]    PA completed on CM  Key: Bacilio Doyle

## 2021-06-29 NOTE — TELEPHONE ENCOUNTER
per [facility] approved  Request Reference Number: [id number]   NURTEC TAB 75MG ODT is approved through 12/31/2021    Left message for pharm making them aware of approval

## 2021-07-18 DIAGNOSIS — G43.711 INTRACTABLE CHRONIC MIGRAINE WITHOUT AURA AND WITH STATUS MIGRAINOSUS: ICD-10-CM

## 2021-07-19 RX ORDER — CYPROHEPTADINE HYDROCHLORIDE 4 MG/1
TABLET ORAL
Qty: 30 TABLET | Refills: 0 | Status: SHIPPED | OUTPATIENT
Start: 2021-07-19 | End: 2021-09-08 | Stop reason: SDUPTHER

## 2021-07-20 ENCOUNTER — TELEPHONE (OUTPATIENT)
Dept: NEUROLOGY | Facility: CLINIC | Age: 56
End: 2021-07-20

## 2021-07-20 NOTE — TELEPHONE ENCOUNTER
Pt is applying for [employer] safety net foundation for Aimovig  See encounter from 6/23 for more info  They need PA determination letter faxed to them once received    [phone]    Aimovig PA completed on CMM  Spivey: Luise Severance

## 2021-07-22 ENCOUNTER — TELEPHONE (OUTPATIENT)
Dept: NEUROLOGY | Facility: CLINIC | Age: 56
End: 2021-07-22

## 2021-07-22 NOTE — TELEPHONE ENCOUNTER
Per [facility]- Approved  Request Reference Number: [id number]  AIMOVIG INJ 140MG/ML is approved through 12/31/2021      Approval letter faxed to Yasir Perkins at [phone]

## 2021-09-03 ENCOUNTER — TELEPHONE (OUTPATIENT)
Dept: NEUROLOGY | Facility: CLINIC | Age: 56
End: 2021-09-03

## 2021-09-03 NOTE — TELEPHONE ENCOUNTER
Called patient and I left a message to call back to confirm her 09/08/21 at 09:00am appointment with Michael Glover in the [facility]

## 2021-09-08 ENCOUNTER — OFFICE VISIT (OUTPATIENT)
Dept: NEUROLOGY | Facility: CLINIC | Age: 56
End: 2021-09-08
Payer: COMMERCIAL

## 2021-09-08 VITALS
TEMPERATURE: 97.7 F | WEIGHT: 211 LBS | SYSTOLIC BLOOD PRESSURE: 142 MMHG | HEART RATE: 78 BPM | DIASTOLIC BLOOD PRESSURE: 81 MMHG | HEIGHT: 63 IN | BODY MASS INDEX: 37.39 KG/M2

## 2021-09-08 DIAGNOSIS — G43.711 INTRACTABLE CHRONIC MIGRAINE WITHOUT AURA AND WITH STATUS MIGRAINOSUS: ICD-10-CM

## 2021-09-08 DIAGNOSIS — G43.709 CHRONIC MIGRAINE WITHOUT AURA WITHOUT STATUS MIGRAINOSUS, NOT INTRACTABLE: ICD-10-CM

## 2021-09-08 PROCEDURE — 99214 OFFICE O/P EST MOD 30 MIN: CPT | Performed by: PHYSICIAN ASSISTANT

## 2021-09-08 RX ORDER — LAMOTRIGINE 100 MG/1
TABLET ORAL
Qty: 45 TABLET | Refills: 11 | Status: SHIPPED | OUTPATIENT
Start: 2021-09-08

## 2021-09-08 RX ORDER — PROCHLORPERAZINE MALEATE 10 MG
10 TABLET ORAL EVERY 8 HOURS PRN
Qty: 20 TABLET | Refills: 3 | Status: SHIPPED | OUTPATIENT
Start: 2021-09-08

## 2021-09-08 RX ORDER — CYPROHEPTADINE HYDROCHLORIDE 4 MG/1
4 TABLET ORAL
Qty: 30 TABLET | Refills: 6 | Status: SHIPPED | OUTPATIENT
Start: 2021-09-08

## 2021-09-08 NOTE — PATIENT INSTRUCTIONS
Chronic migraine headaches:  Preventive therapy for headaches:   - cyproheptadine 4 mg at bedtime   - Increase Lamictal to 50 mg in am and 75 mg at bedtime for 7 days then increase to 50 mg in am and 100 mg at bedtime  Abortive therapy for headaches:   - At onset of migraine, take Nurtec 75 mg  Limit of 1 tab in 24 hours  - Status post gastric bypass in February of 2019  - To help break her current headache will have her take Compazine (prochlorperazine) 10 mg 3 times a day for 3 days along with Benadryl 50 mg at bedtime         Headache management instructions  - When patient has a moderate to severe headache, they should seek rest, initiate relaxation and apply cold compresses to the head  - Maintain regular sleep schedule  Adults need at least 7-8 hours of uninterrupted a night  - Limit over the counter medications such as Tylenol, Ibuprofen, Aleve, Excedrin  (No more than 3 times a week)  - Maintain headache diary   We discussed an JEREMI for a smart phone is "Migraine nely"  - Limit caffeine to 1-2 cups 8 to 16 oz a day or less  - Avoid dietary trigger  (aged cheese, peanuts, MSG, aspartame and nitrates)  - Patient is to have regular frequent meals to prevent headache onset     - Please drink at least 64 ounces of water a day to help remain hydrated      Please call with any questions or concerns   Office number is [phone] Patient pleasant and cooperative all shift. Used call light frequently. Called kitchen frequently for meals. Encouraged patient to call only for meals and for one snack between each meal. Occasionally asking for a massage but staff continues to reiterate that we cannot do massages on this unit. Good appetite up with SBA with walker.

## 2021-09-08 NOTE — PROGRESS NOTES
[facility]  PATIENT:  Dorota Gamboa  MRN:  [id number]  :  1965  DATE OF SERVICE:  2021      Assessment/Plan:     Chronic migraine without aura  Preventive therapy for headaches:   - cyproheptadine 4 mg at bedtime   - Increase Lamictal to 50 mg in am and 75 mg at bedtime for 7 days then increase to 50 mg in am and 100 mg at bedtime  Abortive therapy for headaches:   - At onset of migraine, take Nurtec 75 mg  Limit of 1 tab in 24 hours  - Status post gastric bypass in 2019  - To help break her current headache will have her take Compazine (prochlorperazine) 10 mg  As needed         Problem List Items Addressed This Visit        Cardiovascular and Mediastinum    Chronic migraine without aura     Preventive therapy for headaches:   - cyproheptadine 4 mg at bedtime   - Increase Lamictal to 50 mg in am and 75 mg at bedtime for 7 days then increase to 50 mg in am and 100 mg at bedtime  Abortive therapy for headaches:   - At onset of migraine, take Nurtec 75 mg  Limit of 1 tab in 24 hours  - Status post gastric bypass in 2019  - To help break her current headache will have her take Compazine (prochlorperazine) 10 mg  As needed         Relevant Medications    prochlorperazine (COMPAZINE) 10 mg tablet    cyproheptadine (PERIACTIN) 4 mg tablet    lamoTRIgine (LaMICtal) 100 mg tablet              History of Present Illness: We had the pleasure of evaluating Dorota Gamboa in neurological follow up  today for headaches  As you know,  she is a 48 y o   left handed female  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    PREVENTATIVE:  - vitamin-D 2,  - Botox -2018, restarted 11/4/2020  - Emgality 120 mg (started-January 2020),  - gabapentin 300 mg 2 capsules, Depakote (for 5 days but did not help), Topamax (not effective)  - amlodipine 10 mg, diltiazem, propanolol  - olanzapine 5 mg (does not help either),   - Cymbalta 60 mg, nortriptyline, protriptyline  - cyproheptadine  ABORTIVE:  - Decadron 2 mg (Gastric bypass)  - Advil, Aleve, Toradol injection (work the best),  - Midrin, Fioricet  - Reglan 10 mg, Compazine 10 mg,   - Imitrex 100 mg (heart palp and in hospital for 4 days), Maxalt 10 mg   - Miri Galvez      What is your current pain level - 1/10     How often do the headaches occur?   5-6 a week prior was Daily      Are you ever headache free? Not for past month  Aura/warning and how long does it last - none     What time of the day do the headaches start? Typically wakes up with them     How long do the headaches last? It will last one day but can last up to weeks     Describe your usual headache - Throbbing, Pounding, Pressure,      Where is your headache located? Typically left side of her head and at time on the right and that is when it is really severe       What is the intensity of pain? 4 to 10/10; average 7/10      Associated symptoms:   · Decrease of appetite, nausea, vomiting, diarrhea  · Photophobia, phonophobia, sensitivity to smell   · Problem with concentration, forgets what she is talking about  · light-headed or dizzy, stiff or sore neck,   · Hands or feet tingle or feel numb, prefer to be alone and in a dark room, unable to work     Number of days missed per month because of headaches:  Work (or school) days: 5-6 times in a month can not take care of her grandchildren  Social or Family activities: yes, misses most     Alternative therapies used in the past for headaches? heating pad  Headache are worse if the patient: cough, sneeze  Headache triggers: Food ( MSG ), Stress, weather change, lack of sleep, neck pain     What time of the year do headaches occur more frequently? fall  Have you seen someone else for headaches or pain? No  Have you had trigger point injection performed and how often? Yes  Have you had Botox injection performed and how often? Yes   Have you had epidural injections or transforaminal injections performed? No     Have you used CBD or THC for your headaches and how often? No     Have you ever had any Brain imaging? yes      MRI brain 2015  IMPRESSION-   1   Several small scattered white matter lesions within the cerebral hemispheres may represent precocious chronic microvascular ischemic disease   These are unchanged   Correlate for history of hypertension, diabetes or hypercholesterolemia  2   No evidence of mass, hemorrhage or acute ischemia         06/17/2019-MRI of the brain  Stable MRI of the brain, redemonstrating nonspecific cerebral white matter foci of signal abnormality which are in keeping with the patient's reported history of migraines   No pathologic intracranial enhancement    Probable secretions within the posterior left nasopharynx   Recommend direct inspection to exclude a mucosal abnormality   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         I personally reviewed these images      Reviewed old notes from physician seen in the past- see above HPI for summary of previous encounters         Past Medical History:   Diagnosis Date    Asthma     Hypertension     Migraine     Rapid heart rate        Patient Active Problem List   Diagnosis    Chronic migraine without aura    Peripheral neuropathy    B12 deficiency    Vitamin D deficiency       Medications:      Current Outpatient Medications   Medication Sig Dispense Refill    B-D 3CC LUER-ARACELIS SYR 25GX1/2" 25G X 1-1/2" 3 ML MISC USE FOR TORADOL INTRAMUSCULAR INJECTIONS 6 each 2    BREO ELLIPTA Inhale 1 puff Daily  5    clonazePAM (KlonoPIN) 0 5 mg tablet Take 0 5 mg by mouth 2 (two) times a day       cyproheptadine (PERIACTIN) 4 mg tablet Take 1 tablet (4 mg total) by mouth daily at bedtime 30 tablet 6    FLUoxetine HCl (PROZAC PO) Take 2 tablets by mouth daily       ketorolac (TORADOL) 30 mg/mL injection INJECT 1-2 ML 'S IN THE MUSCLE ONCE PER 24 HOURS AS NEEDED FOR MIGRAINE  NO MORE THAN 2 INJECTIONS PER WEEK 6 mL 0    lamoTRIgine (LaMICtal) 25 mg tablet 1 tab at bedtime for 7 nights, 1 tab twice a day for 7 days, 1 tab in am and 2 tabs bedtime for 7 days then 2 tabs twice a day (Patient taking differently: Take 50 mg by mouth 2 (two) times a day ) 120 tablet 6    PROAIR  (90 Base) MCG/ACT inhaler Inhale 2 puffs every 6 (six) hours as needed   1    prochlorperazine (COMPAZINE) 10 mg tablet Take 1 tablet (10 mg total) by mouth every 8 (eight) hours as needed for nausea or vomiting (migraine) 20 tablet 3    Rimegepant Sulfate (Nurtec) 75 MG TBDP Take 75 mg by mouth as needed (migraine) Limit of 1 in 24 hours 8 tablet 3    Syringe/Needle, Disp, (SYRINGE 3CC/47JX5-1/4") 27G X 1-1/4" 3 ML MISC Use for Toradol intramuscular injections  3 each 2    lamoTRIgine (LaMICtal) 100 mg tablet 1/2 tab (50 mg) in am and 1 tab (100mg) bedtime 45 tablet 11     No current facility-administered medications for this visit  Allergies:       Allergies   Allergen Reactions    Latex     Other      Annotation - [id number]: STEROIDS    Penicillins        Family History:     Family History   Problem Relation Age of Onset    Diabetes Father     Lung cancer Father        Social History:     Social History     Socioeconomic History    Marital status: /Civil Union     Spouse name: Not on file    Number of children: Not on file    Years of education: Not on file    Highest education level: Not on file   Occupational History    Not on file   Tobacco Use    Smoking status: Former Smoker     Quit date:      Years since quittin 7    Smokeless tobacco: Never Used   Vaping Use    Vaping Use: Never used   Substance and Sexual Activity    Alcohol use: Not Currently    Drug use: Never    Sexual activity: Not on file   Other Topics Concern    Not on file   Social History Narrative    Not on file     Social Determinants of Health     Financial Resource Strain:     Difficulty of Paying Living Expenses:    Food Insecurity:     Worried About Running Out of Food in the Last Year:     [address] in the Last Year:    Transportation Needs:     Lack of Transportation (Medical):  Lack of Transportation (Non-Medical):    Physical Activity:     Days of Exercise per Week:     Minutes of Exercise per Session:    Stress:     Feeling of Stress :    Social Connections:     Frequency of Communication with Friends and Family:     Frequency of Social Gatherings with Friends and Family:     Attends Yazdanism Services:     Active Member of Clubs or Organizations:     Attends Club or Organization Meetings:     Marital Status:    Intimate Partner Violence:     Fear of Current or Ex-Partner:     Emotionally Abused:     Physically Abused:     Sexually Abused:       I have reviewed the patient's medical, social and surgical history as well as medications in detail and updated the computerized patient record        Objective:   Physical Exam:                                                                   Vitals:            /81 (BP Location: Left arm, Patient Position: Sitting, Cuff Size: Standard)   Pulse 78   Temp 97 7 °F (36 5 °C) (Temporal)   Ht 5' 2 5" (1 588 m)   Wt 95 7 kg (211 lb)   BMI 37 98 kg/m²   BP Readings from Last 3 Encounters:   21 142/81   21 128/88   21 148/99     Pulse Readings from Last 3 Encounters:   21 78   21 80   21 93          CONSTITUTIONAL: Well developed, well nourished, well groomed  No dysmorphic features  Eyes:  EOM normal      Neck:  Normal ROM, neck supple  HEENT:  Normocephalic atraumatic  Chest:  Respirations regular and unlabored  Psychiatric:  Normal behavior and appropriate affect      MENTAL STATUS  Orientation: Alert and oriented x 3  Fund of knowledge: Intact  MOTOR (Upper and lower extremities)   Bulk/tone/abnormal movement: Normal muscle bulk and tone  COORDINATION   Station/Gait: Normal baseline gait  Review of Systems:   Review of Systems  Constitutional: Negative  HENT: Negative  Eyes: Positive for photophobia  Respiratory: Negative  Cardiovascular: Negative  Gastrointestinal: Positive for nausea  Endocrine: Negative  Genitourinary: Negative  Musculoskeletal: Negative  Skin: Negative  Allergic/Immunologic: Negative  Neurological: Positive for headaches  Hematological: Negative  Psychiatric/Behavioral: Negative  I personally reviewed the ROS entered by the MA    I spent 20 minutes in face-to-face discussion regarding  the pathophysiology of her current symptoms and further plan, as well as counseling, educating, and coordinating the patient's care including prognosis of diagnosis, diagnostic results, impression, and recommendations, risks and benefits of treatment, instructions for disease self management, treatment instructions and follow up requirements and spent 15 minutes non-face to face    Author:  Madhuri Lerma PA-C 9/8/2021 9:25 AM

## 2021-11-02 ENCOUNTER — TELEPHONE (OUTPATIENT)
Dept: NEUROLOGY | Facility: CLINIC | Age: 56
End: 2021-11-02

## 2021-12-27 ENCOUNTER — APPOINTMENT (EMERGENCY)
Dept: CT IMAGING | Facility: HOSPITAL | Age: 56
End: 2021-12-27
Payer: COMMERCIAL

## 2021-12-27 ENCOUNTER — HOSPITAL ENCOUNTER (EMERGENCY)
Facility: HOSPITAL | Age: 56
Discharge: HOME | End: 2021-12-27
Attending: EMERGENCY MEDICINE | Admitting: EMERGENCY MEDICINE
Payer: COMMERCIAL

## 2021-12-27 VITALS
WEIGHT: 185 LBS | TEMPERATURE: 98.5 F | RESPIRATION RATE: 18 BRPM | HEART RATE: 75 BPM | DIASTOLIC BLOOD PRESSURE: 65 MMHG | SYSTOLIC BLOOD PRESSURE: 107 MMHG | OXYGEN SATURATION: 100 % | BODY MASS INDEX: 33.3 KG/M2

## 2021-12-27 DIAGNOSIS — R10.9 ABDOMINAL PAIN: Primary | ICD-10-CM

## 2021-12-27 LAB
ALBUMIN SERPL BCP-MCNC: 3.8 G/DL (ref 3.4–4.8)
ALP SERPL-CCNC: 92.7 U/L (ref 35–140)
ALT SERPL W P-5'-P-CCNC: 15 U/L (ref 5–54)
ANION GAP SERPL CALCULATED.3IONS-SCNC: 7 MMOL/L (ref 4–13)
AST SERPL W P-5'-P-CCNC: 15 U/L (ref 15–41)
BASOPHILS # BLD AUTO: 0.05 THOUSANDS/ΜL (ref 0–0.1)
BASOPHILS NFR BLD AUTO: 1 % (ref 0–1)
BILIRUB SERPL-MCNC: 0.29 MG/DL (ref 0.3–1.2)
BUN SERPL-MCNC: 8 MG/DL (ref 6–20)
CALCIUM SERPL-MCNC: 8.9 MG/DL (ref 8.4–10.2)
CHLORIDE SERPL-SCNC: 105 MMOL/L (ref 96–108)
CO2 SERPL-SCNC: 27 MMOL/L (ref 22–33)
CREAT SERPL-MCNC: 0.84 MG/DL (ref 0.4–1.1)
EOSINOPHIL # BLD AUTO: 0.25 THOUSAND/ΜL (ref 0–0.61)
EOSINOPHIL NFR BLD AUTO: 4 % (ref 0–6)
ERYTHROCYTE [DISTWIDTH] IN BLOOD BY AUTOMATED COUNT: 15.7 % (ref 11.6–15.1)
GFR SERPL CREATININE-BSD FRML MDRD: 78 ML/MIN/1.73SQ M
GLUCOSE SERPL-MCNC: 93 MG/DL (ref 65–140)
HCT VFR BLD AUTO: 38.4 % (ref 34.8–46.1)
HGB BLD-MCNC: 12 G/DL (ref 11.5–15.4)
IMM GRANULOCYTES # BLD AUTO: 0.02 THOUSAND/UL (ref 0–0.2)
IMM GRANULOCYTES NFR BLD AUTO: 0 % (ref 0–2)
LIPASE SERPL-CCNC: 17 U/L (ref 13–60)
LYMPHOCYTES # BLD AUTO: 1.24 THOUSANDS/ΜL (ref 0.6–4.47)
LYMPHOCYTES NFR BLD AUTO: 19 % (ref 14–44)
MCH RBC QN AUTO: 23.8 PG (ref 26.8–34.3)
MCHC RBC AUTO-ENTMCNC: 31.3 G/DL (ref 31.4–37.4)
MCV RBC AUTO: 76 FL (ref 82–98)
MONOCYTES # BLD AUTO: 0.34 THOUSAND/ΜL (ref 0.17–1.22)
MONOCYTES NFR BLD AUTO: 5 % (ref 4–12)
NEUTROPHILS # BLD AUTO: 4.65 THOUSANDS/ΜL (ref 1.85–7.62)
NEUTS SEG NFR BLD AUTO: 71 % (ref 43–75)
NRBC BLD AUTO-RTO: 0 /100 WBCS
PLATELET # BLD AUTO: 354 THOUSANDS/UL (ref 149–390)
PMV BLD AUTO: 9.5 FL (ref 8.9–12.7)
POTASSIUM SERPL-SCNC: 3.8 MMOL/L (ref 3.5–5)
PROT SERPL-MCNC: 6.7 G/DL (ref 6.4–8.3)
RBC # BLD AUTO: 5.04 MILLION/UL (ref 3.81–5.12)
SODIUM SERPL-SCNC: 139 MMOL/L (ref 133–145)
WBC # BLD AUTO: 6.55 THOUSAND/UL (ref 4.31–10.16)

## 2021-12-27 PROCEDURE — 80053 COMPREHEN METABOLIC PANEL: CPT | Performed by: EMERGENCY MEDICINE

## 2021-12-27 PROCEDURE — G1004 CDSM NDSC: HCPCS

## 2021-12-27 PROCEDURE — 96375 TX/PRO/DX INJ NEW DRUG ADDON: CPT

## 2021-12-27 PROCEDURE — 96374 THER/PROPH/DIAG INJ IV PUSH: CPT

## 2021-12-27 PROCEDURE — 74177 CT ABD & PELVIS W/CONTRAST: CPT

## 2021-12-27 PROCEDURE — 36415 COLL VENOUS BLD VENIPUNCTURE: CPT | Performed by: EMERGENCY MEDICINE

## 2021-12-27 PROCEDURE — 99285 EMERGENCY DEPT VISIT HI MDM: CPT | Performed by: EMERGENCY MEDICINE

## 2021-12-27 PROCEDURE — 99284 EMERGENCY DEPT VISIT MOD MDM: CPT

## 2021-12-27 PROCEDURE — 85025 COMPLETE CBC W/AUTO DIFF WBC: CPT | Performed by: EMERGENCY MEDICINE

## 2021-12-27 PROCEDURE — 83690 ASSAY OF LIPASE: CPT | Performed by: EMERGENCY MEDICINE

## 2021-12-27 PROCEDURE — 96376 TX/PRO/DX INJ SAME DRUG ADON: CPT

## 2021-12-27 RX ORDER — ONDANSETRON 2 MG/ML
4 INJECTION INTRAMUSCULAR; INTRAVENOUS ONCE
Status: COMPLETED | OUTPATIENT
Start: 2021-12-27 | End: 2021-12-27

## 2021-12-27 RX ORDER — MORPHINE SULFATE 4 MG/ML
4 INJECTION, SOLUTION INTRAMUSCULAR; INTRAVENOUS ONCE
Status: COMPLETED | OUTPATIENT
Start: 2021-12-27 | End: 2021-12-27

## 2021-12-27 RX ADMIN — ONDANSETRON 4 MG: 2 INJECTION INTRAMUSCULAR; INTRAVENOUS at 10:47

## 2021-12-27 RX ADMIN — MORPHINE SULFATE 4 MG: 4 INJECTION INTRAVENOUS at 10:47

## 2021-12-27 RX ADMIN — ONDANSETRON 4 MG: 2 INJECTION INTRAMUSCULAR; INTRAVENOUS at 12:19

## 2021-12-27 RX ADMIN — IOHEXOL 100 ML: 350 INJECTION, SOLUTION INTRAVENOUS at 13:06

## 2021-12-27 RX ADMIN — IOHEXOL 50 ML: 240 INJECTION, SOLUTION INTRATHECAL; INTRAVASCULAR; INTRAVENOUS; ORAL at 13:06

## 2021-12-27 RX ADMIN — MORPHINE SULFATE 4 MG: 4 INJECTION INTRAVENOUS at 12:19

## 2021-12-29 ENCOUNTER — OFFICE VISIT (OUTPATIENT)
Dept: SURGERY | Facility: CLINIC | Age: 56
End: 2021-12-29
Payer: COMMERCIAL

## 2021-12-29 VITALS
BODY MASS INDEX: 38.28 KG/M2 | RESPIRATION RATE: 18 BRPM | TEMPERATURE: 98.1 F | HEIGHT: 62 IN | WEIGHT: 208 LBS | DIASTOLIC BLOOD PRESSURE: 80 MMHG | SYSTOLIC BLOOD PRESSURE: 138 MMHG | HEART RATE: 98 BPM

## 2021-12-29 DIAGNOSIS — R10.9 ABDOMINAL PAIN: Primary | ICD-10-CM

## 2021-12-29 DIAGNOSIS — Z98.84 HISTORY OF GASTRIC BYPASS: ICD-10-CM

## 2021-12-29 DIAGNOSIS — K43.2 INCISIONAL HERNIA: ICD-10-CM

## 2021-12-29 DIAGNOSIS — R13.10 DYSPHAGIA: ICD-10-CM

## 2021-12-29 PROCEDURE — 99214 OFFICE O/P EST MOD 30 MIN: CPT | Performed by: SURGERY

## 2021-12-29 RX ORDER — GALCANEZUMAB 120 MG/ML
INJECTION, SOLUTION SUBCUTANEOUS
COMMUNITY
Start: 2021-10-02 | End: 2022-01-26

## 2021-12-29 RX ORDER — FLUOXETINE HYDROCHLORIDE 40 MG/1
CAPSULE ORAL
COMMUNITY
Start: 2021-12-08

## 2021-12-29 RX ORDER — BUPROPION HYDROCHLORIDE 300 MG/1
300 TABLET ORAL DAILY
COMMUNITY
Start: 2021-12-22 | End: 2022-01-26

## 2021-12-29 RX ORDER — UBROGEPANT 50 MG/1
TABLET ORAL
COMMUNITY
Start: 2021-10-04 | End: 2022-01-26

## 2021-12-29 RX ORDER — AZITHROMYCIN 250 MG/1
TABLET, FILM COATED ORAL
Status: ON HOLD | COMMUNITY
Start: 2021-11-15 | End: 2022-01-28 | Stop reason: ALTCHOICE

## 2021-12-29 RX ORDER — BUDESONIDE AND FORMOTEROL FUMARATE DIHYDRATE 160; 4.5 UG/1; UG/1
2 AEROSOL RESPIRATORY (INHALATION) 2 TIMES DAILY
COMMUNITY
Start: 2021-11-07

## 2021-12-31 NOTE — ED PROVIDER NOTES
History  Chief Complaint   Patient presents with    Abdominal Pain     abdominal pain only after eating/ drinking  denies fevers, N/V/D  tenderness from ribs down on palpation  no change in stool  hx gastric bypass 3 years ago with post surgical "blockage"     This is a 81-year-old female presents the emergency department complaining of abdominal pain  The patient states that the pain began approximately 5 days ago  She states it is located throughout her entire abdomen  She has had nausea vomiting and diarrhea  She denies fevers or chills  She denies chest pain or trouble breathing  She has had bowel movements and has been passing gas today  Prior to Admission Medications   Prescriptions Last Dose Informant Patient Reported? Taking? B-D 3CC LUER-ARACELIS SYR 25GX1/2" 25G X 1-1/2" 3 ML MISC  Self No No   Sig: USE FOR TORADOL INTRAMUSCULAR INJECTIONS   Patient not taking: Reported on 12/29/2021    BREO ELLIPTA 12/27/2021 at Unknown time Self Yes Yes   Sig: Inhale 1 puff Daily   FLUoxetine HCl (PROZAC PO) 12/27/2021 at Unknown time Self Yes Yes   Sig: Take 2 tablets by mouth daily    Patient not taking: Reported on 12/29/2021    PROAIR  (90 Base) MCG/ACT inhaler  Self Yes No   Sig: Inhale 2 puffs every 6 (six) hours as needed    Rimegepant Sulfate (Nurtec) 75 MG TBDP Not Taking at Unknown time Self No No   Sig: Take 75 mg by mouth as needed (migraine) Limit of 1 in 24 hours   Patient not taking: Reported on 12/27/2021    Syringe/Needle, Disp, (SYRINGE 3CC/55WU6-1/4") 27G X 1-1/4" 3 ML MISC  Self No No   Sig: Use for Toradol intramuscular injections     Patient not taking: Reported on 12/29/2021    clonazePAM (KlonoPIN) 0 5 mg tablet 12/27/2021 at Unknown time Self Yes Yes   Sig: Take 0 5 mg by mouth daily     cyproheptadine (PERIACTIN) 4 mg tablet Not Taking at Unknown time Self No No   Sig: Take 1 tablet (4 mg total) by mouth daily at bedtime   Patient not taking: Reported on 12/27/2021 ketorolac (TORADOL) 30 mg/mL injection  Self No No   Sig: INJECT 1-2 ML 'S IN THE MUSCLE ONCE PER 24 HOURS AS NEEDED FOR MIGRAINE  NO MORE THAN 2 INJECTIONS PER WEEK   lamoTRIgine (LaMICtal) 100 mg tablet 2021 at Unknown time Self No Yes   Si/2 tab (50 mg) in am and 1 tab (100mg) bedtime   lamoTRIgine (LaMICtal) 25 mg tablet  Self No No   Si tab at bedtime for 7 nights, 1 tab twice a day for 7 days, 1 tab in am and 2 tabs bedtime for 7 days then 2 tabs twice a day   Patient not taking: Reported on 2021    prochlorperazine (COMPAZINE) 10 mg tablet Not Taking at Unknown time Self No No   Sig: Take 1 tablet (10 mg total) by mouth every 8 (eight) hours as needed for nausea or vomiting (migraine)   Patient not taking: Reported on 2021       Facility-Administered Medications: None       Past Medical History:   Diagnosis Date    Asthma     Hypertension     Migraine     Rapid heart rate        Past Surgical History:   Procedure Laterality Date    CHOLECYSTECTOMY      GASTRIC BYPASS      HERNIA REPAIR      LUNG SURGERY         Family History   Problem Relation Age of Onset    Diabetes Father     Lung cancer Father      I have reviewed and agree with the history as documented  E-Cigarette/Vaping    E-Cigarette Use Never User      E-Cigarette/Vaping Substances    Nicotine No     THC No     CBD No     Flavoring No     Other No     Unknown No      Social History     Tobacco Use    Smoking status: Former Smoker     Quit date:      Years since quittin 0    Smokeless tobacco: Never Used   Vaping Use    Vaping Use: Never used   Substance Use Topics    Alcohol use: Not Currently    Drug use: Never       Review of Systems   All other systems reviewed and are negative        Physical Exam  Physical Exam  Constitutional:  Vital signs reviewed, patient appears nontoxic, no acute distress  Eyes: Pupils equal round reactive to light and accommodation, extraocular muscles intact  HEENT: trachea midline, no JVD, moist mucous membranes  Respiratory: lung sounds clear throughout, no rhonchi, no rales  Cardiovascular: regular rate rhythm, no murmurs or rubs  Abdomen: soft, diffusely tender, nondistended, no rebound or guarding  Back: no CVA tenderness, normal inspection  Extremities: no edema, pulses equal in all 4 extremities  Neuro: awake, alert, oriented, no focal weakness  Skin: warm, dry, intact, no rashes noted    Vital Signs  ED Triage Vitals   Temperature Pulse Respirations Blood Pressure SpO2   12/27/21 1006 12/27/21 1006 12/27/21 1006 12/27/21 1006 12/27/21 1006   98 5 °F (36 9 °C) 93 16 139/79 100 %      Temp Source Heart Rate Source Patient Position - Orthostatic VS BP Location FiO2 (%)   12/27/21 1006 12/27/21 1006 12/27/21 1006 12/27/21 1006 --   Oral Monitor Sitting Left arm       Pain Score       12/27/21 1047       4           Vitals:    12/27/21 1006 12/27/21 1225   BP: 139/79 107/65   Pulse: 93 75   Patient Position - Orthostatic VS: Sitting Sitting         Visual Acuity      ED Medications  Medications   ondansetron (ZOFRAN) injection 4 mg (4 mg Intravenous Given 12/27/21 1047)   morphine (PF) 4 mg/mL injection 4 mg (4 mg Intravenous Given 12/27/21 1047)   ondansetron (ZOFRAN) injection 4 mg (4 mg Intravenous Given 12/27/21 1219)   morphine (PF) 4 mg/mL injection 4 mg (4 mg Intravenous Given 12/27/21 1219)   iohexol (OMNIPAQUE) 350 MG/ML injection (SINGLE-DOSE) 100 mL (100 mL Intravenous Given 12/27/21 1306)   iohexol (OMNIPAQUE) 240 MG/ML solution 50 mL (50 mL Oral Given 12/27/21 1306)       Diagnostic Studies  Results Reviewed     Procedure Component Value Units Date/Time    CMP [[id number]]  (Abnormal) Collected: 12/27/21 1050    Lab Status: Final result Specimen: Blood from Arm, Right Updated: 12/27/21 1111     Sodium 139 mmol/L      Potassium 3 8 mmol/L      Chloride 105 mmol/L      CO2 27 mmol/L      ANION GAP 7 mmol/L      BUN 8 mg/dL      Creatinine 0 84 mg/dL Glucose 93 mg/dL      Calcium 8 9 mg/dL      AST 15 U/L      ALT 15 U/L      Alkaline Phosphatase 92 7 U/L      Total Protein 6 7 g/dL      Albumin 3 8 g/dL      Total Bilirubin 0 29 mg/dL      eGFR 78 ml/min/1 73sq m     Narrative:      [place] guidelines for Chronic Kidney Disease (CKD):     Stage 1 with normal or high GFR (GFR > 90 mL/min/1 73 square meters)    Stage 2 Mild CKD (GFR = 60-89 mL/min/1 73 square meters)    Stage 3A Moderate CKD (GFR = 45-59 mL/min/1 73 square meters)    Stage 3B Moderate CKD (GFR = 30-44 mL/min/1 73 square meters)    Stage 4 Severe CKD (GFR = 15-29 mL/min/1 73 square meters)    Stage 5 End Stage CKD (GFR <15 mL/min/1 73 square meters)  Note: GFR calculation is accurate only with a steady state creatinine    Lipase [[id number]]  (Normal) Collected: 12/27/21 1050    Lab Status: Final result Specimen: Blood from Arm, Right Updated: 12/27/21 1111     Lipase 17 u/L     CBC and differential [[id number]]  (Abnormal) Collected: 12/27/21 1050    Lab Status: Final result Specimen: Blood from Arm, Right Updated: 12/27/21 1054     WBC 6 55 Thousand/uL      RBC 5 04 Million/uL      Hemoglobin 12 0 g/dL      Hematocrit 38 4 %      MCV 76 fL      MCH 23 8 pg      MCHC 31 3 g/dL      RDW 15 7 %      MPV 9 5 fL      Platelets 289 Thousands/uL      nRBC 0 /100 WBCs      Neutrophils Relative 71 %      Immat GRANS % 0 %      Lymphocytes Relative 19 %      Monocytes Relative 5 %      Eosinophils Relative 4 %      Basophils Relative 1 %      Neutrophils Absolute 4 65 Thousands/µL      Immature Grans Absolute 0 02 Thousand/uL      Lymphocytes Absolute 1 24 Thousands/µL      Monocytes Absolute 0 34 Thousand/µL      Eosinophils Absolute 0 25 Thousand/µL      Basophils Absolute 0 05 Thousands/µL                  CT Abdomen pelvis with contrast   Final Result by Juan Orellana MD (12/27 9252)      Status post gastric bypass   Oral contrast is present in in the excluded segment  No other potential complication  The study was marked in [facility] for immediate notification  Workstation performed: [id number]                    Procedures  Procedures         ED Course                                             MDM  Number of Diagnoses or Management Options  Abdominal pain  Diagnosis management comments: This is a 60-year-old female presented to the emergency department complaining of abdominal pain, nausea, vomiting  The patient was well-appearing on exam   She had normal vital signs  She had lab work that was significant for a white count of 6 5  She had a CT scan that showed no acute abnormality in the abdomen or pelvis  Patient received pain medication along with nausea medicine and was no longer vomiting  She was tolerating small amounts of p o  Patient was discharged follow-up to her primary care physician and surgeon       Amount and/or Complexity of Data Reviewed  Clinical lab tests: reviewed and ordered  Tests in the radiology section of CPT®: ordered and reviewed  Independent visualization of images, tracings, or specimens: yes        Disposition  Final diagnoses:   Abdominal pain     Time reflects when diagnosis was documented in both MDM as applicable and the Disposition within this note     Time User Action Codes Description Comment    12/27/2021  2:45 PM Robert Thrasher Add [R10 9] Abdominal pain       ED Disposition     ED Disposition Condition Date/Time Comment    Discharge Stable Mon Dec 27, 2021  2:45 PM Kaur Whipple discharge to home/self care              Follow-up Information     Follow up With Specialties Details Why Contact Info Additional Information    Manisha Huerta MD Family Medicine In 2 days  [address]  [facility]  [address]  [address] [facility] [phone]       [address] [place] [address] Emergency Department Emergency Medicine In 2 days  [address] [facility],[address] [facility] Emergency Department, [address], [address] [facility] Benny Brady MD General Surgery In 2 days  [address]  [place] [address]  [phone]             Discharge Medication List as of 12/27/2021  3:07 PM      CONTINUE these medications which have NOT CHANGED    Details   BREO ELLIPTA Inhale 1 puff Daily, Historical Med      clonazePAM (KlonoPIN) 0 5 mg tablet Take 0 5 mg by mouth daily  , Historical Med      FLUoxetine HCl (PROZAC PO) Take 2 tablets by mouth daily , Historical Med      !! lamoTRIgine (LaMICtal) 100 mg tablet 1/2 tab (50 mg) in am and 1 tab (100mg) bedtime, Normal      B-D 3CC LUER-ARACELIS SYR 25GX1/2" 25G X 1-1/2" 3 ML MISC USE FOR TORADOL INTRAMUSCULAR INJECTIONS, Normal      cyproheptadine (PERIACTIN) 4 mg tablet Take 1 tablet (4 mg total) by mouth daily at bedtime, Starting Wed 9/8/2021, Normal      ketorolac (TORADOL) 30 mg/mL injection INJECT 1-2 ML 'S IN THE MUSCLE ONCE PER 24 HOURS AS NEEDED FOR MIGRAINE  NO MORE THAN 2 INJECTIONS PER WEEK, Normal      !! lamoTRIgine (LaMICtal) 25 mg tablet 1 tab at bedtime for 7 nights, 1 tab twice a day for 7 days, 1 tab in am and 2 tabs bedtime for 7 days then 2 tabs twice a day, Normal      PROAIR  (90 Base) MCG/ACT inhaler Inhale 2 puffs every 6 (six) hours as needed , Starting Wed 4/4/2018, Historical Med      prochlorperazine (COMPAZINE) 10 mg tablet Take 1 tablet (10 mg total) by mouth every 8 (eight) hours as needed for nausea or vomiting (migraine), Starting Wed 9/8/2021, Normal      Rimegepant Sulfate (Nurtec) 75 MG TBDP Take 75 mg by mouth as needed (migraine) Limit of 1 in 24 hours, Starting Wed 6/23/2021, Normal      Syringe/Needle, Disp, (SYRINGE 3CC/18GX8-9/4") 27G X 1-1/4" 3 ML MISC Use for Toradol intramuscular injections  , Normal       !! - Potential duplicate medications found  Please discuss with provider  No discharge procedures on file      PDMP Review     None          ED Provider  Electronically Signed by Payam Moya, DO  12/31/21 0012

## 2022-01-03 ENCOUNTER — TELEPHONE (OUTPATIENT)
Dept: SURGERY | Facility: CLINIC | Age: 57
End: 2022-01-03

## 2022-01-03 ENCOUNTER — HOSPITAL ENCOUNTER (OUTPATIENT)
Dept: RADIOLOGY | Facility: HOSPITAL | Age: 57
Discharge: HOME | End: 2022-01-03
Attending: SURGERY
Payer: COMMERCIAL

## 2022-01-03 DIAGNOSIS — Z98.84 BARIATRIC SURGERY STATUS: ICD-10-CM

## 2022-01-03 DIAGNOSIS — R10.13 EPIGASTRIC PAIN: ICD-10-CM

## 2022-01-03 PROCEDURE — 74220 X-RAY XM ESOPHAGUS 1CNTRST: CPT

## 2022-01-03 NOTE — TELEPHONE ENCOUNTER
Attempted to call and left a message, requesting she call the office regarding barium swallow results

## 2022-01-05 ENCOUNTER — TELEPHONE (OUTPATIENT)
Dept: NEUROLOGY | Facility: CLINIC | Age: 57
End: 2022-01-05

## 2022-01-05 ENCOUNTER — OFFICE VISIT (OUTPATIENT)
Dept: SURGERY | Facility: CLINIC | Age: 57
End: 2022-01-05
Payer: COMMERCIAL

## 2022-01-05 VITALS
HEIGHT: 62 IN | TEMPERATURE: 97.3 F | BODY MASS INDEX: 38.28 KG/M2 | SYSTOLIC BLOOD PRESSURE: 120 MMHG | RESPIRATION RATE: 18 BRPM | HEART RATE: 91 BPM | WEIGHT: 208 LBS | DIASTOLIC BLOOD PRESSURE: 84 MMHG

## 2022-01-05 DIAGNOSIS — K43.2 INCISIONAL HERNIA: Primary | ICD-10-CM

## 2022-01-05 PROCEDURE — 99214 OFFICE O/P EST MOD 30 MIN: CPT | Performed by: SURGERY

## 2022-01-05 NOTE — TELEPHONE ENCOUNTER
Received fax from [facility]  Carrol Simmons is about to   Key# [id number]    Per enc 21-PA  on 21    Renewed on [facility]  (Key: [id number]  Message from plan: [employer] does not review [place] for this plan  PA initiated on [facility]   Key: [id number]  Received immediate approval  Approved, Coverage Starts on: 2022 12:00:00 AM, Coverage Ends on: 2022

## 2022-01-05 NOTE — H&P (VIEW-ONLY)
Assessment/Plan:  I reviewed patient's barium swallow with her  I told her that there is no obstruction  The esophagogastric junction is open  The gastrojejunostomy is patent  The contrast was flowing without any resistance across both the areas  There is some reflux present  I am going to start her on Prilosec for that  I also reviewed her CT scan with her  I explained the procedure of repair of incisional hernia with mesh  She wants that to be done as an inpatient procedure  She tells me that she does not deal very well with pain and may need inpatient admission for pain control  I explained to her the procedure complications including but not limited to infection, bleeding, injury to the other organs, recurrence, infection of the mesh  This procedure will be scheduled for Thomas Foster on January 28, 2022  She signed the consent for the procedure  No problem-specific Assessment & Plan notes found for this encounter  Diagnoses and all orders for this visit:    Incisional hernia          Subjective:      Patient ID: Mary Dutta is a 55 y o  female  64-year-old female patient came to my office to discuss about her barium swallow and to schedule surgery for incisional hernia  She says that the she is still having some pain around the incision the hernia site  No nausea or vomiting  She is tolerating diet  She is having regular bowel movements  The following portions of the patient's history were reviewed and updated as appropriate:   She  has a past medical history of Asthma, Hypertension, Migraine, and Rapid heart rate  She   Patient Active Problem List    Diagnosis Date Noted    B12 deficiency 12/14/2020    Vitamin D deficiency 12/14/2020    Chronic migraine without aura 04/24/2019    Peripheral neuropathy 04/24/2019     She  has a past surgical history that includes Gastric bypass; Hernia repair; Cholecystectomy; and Lung surgery    Her family history includes Diabetes in her father; Lung cancer in her father  She  reports that she quit smoking about 36 years ago  She has never used smokeless tobacco  She reports previous alcohol use  She reports that she does not use drugs  Current Outpatient Medications   Medication Sig Dispense Refill    BREO ELLIPTA Inhale 1 puff Daily  5    budesonide-formoterol (SYMBICORT) 160-4 5 mcg/act inhaler Inhale 2 puffs 2 (two) times a day Morning and evening      clonazePAM (KlonoPIN) 0 5 mg tablet Take 0 5 mg by mouth daily        FLUoxetine (PROzac) 40 MG capsule TAKE 2 CAPSULES BY MOUTH EVERY DAY IN THE MORNING      ketorolac (TORADOL) 30 mg/mL injection INJECT 1-2 ML 'S IN THE MUSCLE ONCE PER 24 HOURS AS NEEDED FOR MIGRAINE   NO MORE THAN 2 INJECTIONS PER WEEK 6 mL 0    lamoTRIgine (LaMICtal) 100 mg tablet 1/2 tab (50 mg) in am and 1 tab (100mg) bedtime 45 tablet 11    PROAIR  (90 Base) MCG/ACT inhaler Inhale 2 puffs every 6 (six) hours as needed   1    azithromycin (ZITHROMAX) 250 mg tablet TAKE 2 TABLETS BY MOUTH FOR 1 DAY THEN TAKE 1 TABLET BY MOUTH DAILY FOR 4 DAYS (Patient not taking: Reported on 12/29/2021)      B-D 3CC LUER-ARACELIS SYR 25GX1/2" 25G X 1-1/2" 3 ML MISC USE FOR TORADOL INTRAMUSCULAR INJECTIONS (Patient not taking: Reported on 12/29/2021 ) 6 each 2    buPROPion (WELLBUTRIN XL) 300 mg 24 hr tablet Take 300 mg by mouth daily (Patient not taking: Reported on 12/29/2021 )      cyproheptadine (PERIACTIN) 4 mg tablet Take 1 tablet (4 mg total) by mouth daily at bedtime (Patient not taking: Reported on 12/27/2021 ) 30 tablet 6    Emgality 120 MG/ML SOAJ ADMINISTER 1 SYRINGE UNDER THE SKIN EVERY 30 DAYS AS DIRECTED (Patient not taking: Reported on 12/29/2021)      FLUoxetine HCl (PROZAC PO) Take 2 tablets by mouth daily  (Patient not taking: Reported on 12/29/2021 )      lamoTRIgine (LaMICtal) 25 mg tablet 1 tab at bedtime for 7 nights, 1 tab twice a day for 7 days, 1 tab in am and 2 tabs bedtime for 7 days then 2 tabs twice a day (Patient not taking: Reported on 12/29/2021 ) 120 tablet 6    prochlorperazine (COMPAZINE) 10 mg tablet Take 1 tablet (10 mg total) by mouth every 8 (eight) hours as needed for nausea or vomiting (migraine) (Patient not taking: Reported on 12/27/2021 ) 20 tablet 3    Rimegepant Sulfate (Nurtec) 75 MG TBDP Take 75 mg by mouth as needed (migraine) Limit of 1 in 24 hours (Patient not taking: Reported on 12/27/2021 ) 8 tablet 3    Syringe/Needle, Disp, (SYRINGE 3CC/04BT9-8/4") 27G X 1-1/4" 3 ML MISC Use for Toradol intramuscular injections  (Patient not taking: Reported on 12/29/2021 ) 3 each 2    Ubrelvy 50 MG tablet TAKE 1 TABLET AS NEEDED FOR MIGRAINE  MAY REPEAT ONCE AFTER 2 HOURS  MAX 2 DOSES PER DAY AND 8 DOSES PER MONTH  (Patient not taking: Reported on 12/29/2021)       No current facility-administered medications for this visit  Current Outpatient Medications on File Prior to Visit   Medication Sig    BREO ELLIPTA Inhale 1 puff Daily    budesonide-formoterol (SYMBICORT) 160-4 5 mcg/act inhaler Inhale 2 puffs 2 (two) times a day Morning and evening    clonazePAM (KlonoPIN) 0 5 mg tablet Take 0 5 mg by mouth daily      FLUoxetine (PROzac) 40 MG capsule TAKE 2 CAPSULES BY MOUTH EVERY DAY IN THE MORNING    ketorolac (TORADOL) 30 mg/mL injection INJECT 1-2 ML 'S IN THE MUSCLE ONCE PER 24 HOURS AS NEEDED FOR MIGRAINE   NO MORE THAN 2 INJECTIONS PER WEEK    lamoTRIgine (LaMICtal) 100 mg tablet 1/2 tab (50 mg) in am and 1 tab (100mg) bedtime    PROAIR  (90 Base) MCG/ACT inhaler Inhale 2 puffs every 6 (six) hours as needed     azithromycin (ZITHROMAX) 250 mg tablet TAKE 2 TABLETS BY MOUTH FOR 1 DAY THEN TAKE 1 TABLET BY MOUTH DAILY FOR 4 DAYS (Patient not taking: Reported on 12/29/2021)    B-D 3CC LUER-ARACELIS SYR 25GX1/2" 25G X 1-1/2" 3 ML MISC USE FOR TORADOL INTRAMUSCULAR INJECTIONS (Patient not taking: Reported on 12/29/2021 )    buPROPion (WELLBUTRIN XL) 300 mg 24 hr tablet Take 300 mg by mouth daily (Patient not taking: Reported on 12/29/2021 )    cyproheptadine (PERIACTIN) 4 mg tablet Take 1 tablet (4 mg total) by mouth daily at bedtime (Patient not taking: Reported on 12/27/2021 )    Emgality 120 MG/ML SOAJ ADMINISTER 1 SYRINGE UNDER THE SKIN EVERY 30 DAYS AS DIRECTED (Patient not taking: Reported on 12/29/2021)    FLUoxetine HCl (PROZAC PO) Take 2 tablets by mouth daily  (Patient not taking: Reported on 12/29/2021 )    lamoTRIgine (LaMICtal) 25 mg tablet 1 tab at bedtime for 7 nights, 1 tab twice a day for 7 days, 1 tab in am and 2 tabs bedtime for 7 days then 2 tabs twice a day (Patient not taking: Reported on 12/29/2021 )    prochlorperazine (COMPAZINE) 10 mg tablet Take 1 tablet (10 mg total) by mouth every 8 (eight) hours as needed for nausea or vomiting (migraine) (Patient not taking: Reported on 12/27/2021 )    Rimegepant Sulfate (Nurtec) 75 MG TBDP Take 75 mg by mouth as needed (migraine) Limit of 1 in 24 hours (Patient not taking: Reported on 12/27/2021 )    Syringe/Needle, Disp, (SYRINGE 3CC/92WA2-0/4") 27G X 1-1/4" 3 ML MISC Use for Toradol intramuscular injections  (Patient not taking: Reported on 12/29/2021 )    Ubrelvy 50 MG tablet TAKE 1 TABLET AS NEEDED FOR MIGRAINE  MAY REPEAT ONCE AFTER 2 HOURS  MAX 2 DOSES PER DAY AND 8 DOSES PER MONTH  (Patient not taking: Reported on 12/29/2021)     No current facility-administered medications on file prior to visit  She is allergic to latex, other, and penicillins       Review of Systems   Constitutional: Negative  HENT: Negative  Eyes: Negative  Respiratory: Negative  Cardiovascular: Negative  Gastrointestinal: Positive for abdominal pain  Endocrine: Negative  Genitourinary: Negative  Musculoskeletal: Negative  Skin: Negative  Allergic/Immunologic: Negative  Neurological: Negative  Hematological: Negative  Psychiatric/Behavioral: Negative            Objective:      /84 (BP Location: Left arm, Patient Position: Sitting, Cuff Size: Adult)   Pulse 91   Temp (!) 97 3 °F (36 3 °C)   Resp 18   Ht 5' 2" (1 575 m)   Wt 94 3 kg (208 lb)   BMI 38 04 kg/m²          Physical Exam  Vitals reviewed  Constitutional:       Appearance: She is obese  HENT:      Head: Normocephalic  Nose: Nose normal       Mouth/Throat:      Mouth: Mucous membranes are moist    Eyes:      Pupils: Pupils are equal, round, and reactive to light  Cardiovascular:      Rate and Rhythm: Normal rate and regular rhythm  Pulses: Normal pulses  Heart sounds: Normal heart sounds  Pulmonary:      Effort: Pulmonary effort is normal       Breath sounds: Normal breath sounds  Abdominal:      General: Abdomen is flat  Palpations: Abdomen is soft  Hernia: A hernia (Supraumbilical midline incisional hernia  The defect is about 4 cm  It is reducible  Mildly tender   ) is present  Musculoskeletal:         General: Normal range of motion  Cervical back: Normal range of motion  Skin:     General: Skin is warm  Neurological:      General: No focal deficit present  Mental Status: She is alert     Psychiatric:         Mood and Affect: Mood normal

## 2022-01-05 NOTE — PROGRESS NOTES
Assessment/Plan:  I reviewed patient's barium swallow with her  I told her that there is no obstruction  The esophagogastric junction is open  The gastrojejunostomy is patent  The contrast was flowing without any resistance across both the areas  There is some reflux present  I am going to start her on Prilosec for that  I also reviewed her CT scan with her  I explained the procedure of repair of incisional hernia with mesh  She wants that to be done as an inpatient procedure  She tells me that she does not deal very well with pain and may need inpatient admission for pain control  I explained to her the procedure complications including but not limited to infection, bleeding, injury to the other organs, recurrence, infection of the mesh  This procedure will be scheduled for Gerda Gemma on January 28, 2022  She signed the consent for the procedure  No problem-specific Assessment & Plan notes found for this encounter  Diagnoses and all orders for this visit:    Incisional hernia          Subjective:      Patient ID: Angie Currie is a 55 y o  female  45-year-old female patient came to my office to discuss about her barium swallow and to schedule surgery for incisional hernia  She says that the she is still having some pain around the incision the hernia site  No nausea or vomiting  She is tolerating diet  She is having regular bowel movements  The following portions of the patient's history were reviewed and updated as appropriate:   She  has a past medical history of Asthma, Hypertension, Migraine, and Rapid heart rate  She   Patient Active Problem List    Diagnosis Date Noted    B12 deficiency 12/14/2020    Vitamin D deficiency 12/14/2020    Chronic migraine without aura 04/24/2019    Peripheral neuropathy 04/24/2019     She  has a past surgical history that includes Gastric bypass; Hernia repair; Cholecystectomy; and Lung surgery    Her family history includes Diabetes in her father; Lung cancer in her father  She  reports that she quit smoking about 36 years ago  She has never used smokeless tobacco  She reports previous alcohol use  She reports that she does not use drugs  Current Outpatient Medications   Medication Sig Dispense Refill    BREO ELLIPTA Inhale 1 puff Daily  5    budesonide-formoterol (SYMBICORT) 160-4 5 mcg/act inhaler Inhale 2 puffs 2 (two) times a day Morning and evening      clonazePAM (KlonoPIN) 0 5 mg tablet Take 0 5 mg by mouth daily        FLUoxetine (PROzac) 40 MG capsule TAKE 2 CAPSULES BY MOUTH EVERY DAY IN THE MORNING      ketorolac (TORADOL) 30 mg/mL injection INJECT 1-2 ML 'S IN THE MUSCLE ONCE PER 24 HOURS AS NEEDED FOR MIGRAINE   NO MORE THAN 2 INJECTIONS PER WEEK 6 mL 0    lamoTRIgine (LaMICtal) 100 mg tablet 1/2 tab (50 mg) in am and 1 tab (100mg) bedtime 45 tablet 11    PROAIR  (90 Base) MCG/ACT inhaler Inhale 2 puffs every 6 (six) hours as needed   1    azithromycin (ZITHROMAX) 250 mg tablet TAKE 2 TABLETS BY MOUTH FOR 1 DAY THEN TAKE 1 TABLET BY MOUTH DAILY FOR 4 DAYS (Patient not taking: Reported on 12/29/2021)      B-D 3CC LUER-ARACELIS SYR 25GX1/2" 25G X 1-1/2" 3 ML MISC USE FOR TORADOL INTRAMUSCULAR INJECTIONS (Patient not taking: Reported on 12/29/2021 ) 6 each 2    buPROPion (WELLBUTRIN XL) 300 mg 24 hr tablet Take 300 mg by mouth daily (Patient not taking: Reported on 12/29/2021 )      cyproheptadine (PERIACTIN) 4 mg tablet Take 1 tablet (4 mg total) by mouth daily at bedtime (Patient not taking: Reported on 12/27/2021 ) 30 tablet 6    Emgality 120 MG/ML SOAJ ADMINISTER 1 SYRINGE UNDER THE SKIN EVERY 30 DAYS AS DIRECTED (Patient not taking: Reported on 12/29/2021)      FLUoxetine HCl (PROZAC PO) Take 2 tablets by mouth daily  (Patient not taking: Reported on 12/29/2021 )      lamoTRIgine (LaMICtal) 25 mg tablet 1 tab at bedtime for 7 nights, 1 tab twice a day for 7 days, 1 tab in am and 2 tabs bedtime for 7 days then 2 tabs twice a day (Patient not taking: Reported on 12/29/2021 ) 120 tablet 6    prochlorperazine (COMPAZINE) 10 mg tablet Take 1 tablet (10 mg total) by mouth every 8 (eight) hours as needed for nausea or vomiting (migraine) (Patient not taking: Reported on 12/27/2021 ) 20 tablet 3    Rimegepant Sulfate (Nurtec) 75 MG TBDP Take 75 mg by mouth as needed (migraine) Limit of 1 in 24 hours (Patient not taking: Reported on 12/27/2021 ) 8 tablet 3    Syringe/Needle, Disp, (SYRINGE 3CC/70VL8-8/4") 27G X 1-1/4" 3 ML MISC Use for Toradol intramuscular injections  (Patient not taking: Reported on 12/29/2021 ) 3 each 2    Ubrelvy 50 MG tablet TAKE 1 TABLET AS NEEDED FOR MIGRAINE  MAY REPEAT ONCE AFTER 2 HOURS  MAX 2 DOSES PER DAY AND 8 DOSES PER MONTH  (Patient not taking: Reported on 12/29/2021)       No current facility-administered medications for this visit  Current Outpatient Medications on File Prior to Visit   Medication Sig    BREO ELLIPTA Inhale 1 puff Daily    budesonide-formoterol (SYMBICORT) 160-4 5 mcg/act inhaler Inhale 2 puffs 2 (two) times a day Morning and evening    clonazePAM (KlonoPIN) 0 5 mg tablet Take 0 5 mg by mouth daily      FLUoxetine (PROzac) 40 MG capsule TAKE 2 CAPSULES BY MOUTH EVERY DAY IN THE MORNING    ketorolac (TORADOL) 30 mg/mL injection INJECT 1-2 ML 'S IN THE MUSCLE ONCE PER 24 HOURS AS NEEDED FOR MIGRAINE   NO MORE THAN 2 INJECTIONS PER WEEK    lamoTRIgine (LaMICtal) 100 mg tablet 1/2 tab (50 mg) in am and 1 tab (100mg) bedtime    PROAIR  (90 Base) MCG/ACT inhaler Inhale 2 puffs every 6 (six) hours as needed     azithromycin (ZITHROMAX) 250 mg tablet TAKE 2 TABLETS BY MOUTH FOR 1 DAY THEN TAKE 1 TABLET BY MOUTH DAILY FOR 4 DAYS (Patient not taking: Reported on 12/29/2021)    B-D 3CC LUER-ARACELIS SYR 25GX1/2" 25G X 1-1/2" 3 ML MISC USE FOR TORADOL INTRAMUSCULAR INJECTIONS (Patient not taking: Reported on 12/29/2021 )    buPROPion (WELLBUTRIN XL) 300 mg 24 hr tablet Take 300 mg by mouth daily (Patient not taking: Reported on 12/29/2021 )    cyproheptadine (PERIACTIN) 4 mg tablet Take 1 tablet (4 mg total) by mouth daily at bedtime (Patient not taking: Reported on 12/27/2021 )    Emgality 120 MG/ML SOAJ ADMINISTER 1 SYRINGE UNDER THE SKIN EVERY 30 DAYS AS DIRECTED (Patient not taking: Reported on 12/29/2021)    FLUoxetine HCl (PROZAC PO) Take 2 tablets by mouth daily  (Patient not taking: Reported on 12/29/2021 )    lamoTRIgine (LaMICtal) 25 mg tablet 1 tab at bedtime for 7 nights, 1 tab twice a day for 7 days, 1 tab in am and 2 tabs bedtime for 7 days then 2 tabs twice a day (Patient not taking: Reported on 12/29/2021 )    prochlorperazine (COMPAZINE) 10 mg tablet Take 1 tablet (10 mg total) by mouth every 8 (eight) hours as needed for nausea or vomiting (migraine) (Patient not taking: Reported on 12/27/2021 )    Rimegepant Sulfate (Nurtec) 75 MG TBDP Take 75 mg by mouth as needed (migraine) Limit of 1 in 24 hours (Patient not taking: Reported on 12/27/2021 )    Syringe/Needle, Disp, (SYRINGE 3CC/39VY8-6/4") 27G X 1-1/4" 3 ML MISC Use for Toradol intramuscular injections  (Patient not taking: Reported on 12/29/2021 )    Ubrelvy 50 MG tablet TAKE 1 TABLET AS NEEDED FOR MIGRAINE  MAY REPEAT ONCE AFTER 2 HOURS  MAX 2 DOSES PER DAY AND 8 DOSES PER MONTH  (Patient not taking: Reported on 12/29/2021)     No current facility-administered medications on file prior to visit  She is allergic to latex, other, and penicillins       Review of Systems   Constitutional: Negative  HENT: Negative  Eyes: Negative  Respiratory: Negative  Cardiovascular: Negative  Gastrointestinal: Positive for abdominal pain  Endocrine: Negative  Genitourinary: Negative  Musculoskeletal: Negative  Skin: Negative  Allergic/Immunologic: Negative  Neurological: Negative  Hematological: Negative  Psychiatric/Behavioral: Negative            Objective:      /84 (BP Location: Left arm, Patient Position: Sitting, Cuff Size: Adult)   Pulse 91   Temp (!) 97 3 °F (36 3 °C)   Resp 18   Ht 5' 2" (1 575 m)   Wt 94 3 kg (208 lb)   BMI 38 04 kg/m²          Physical Exam  Vitals reviewed  Constitutional:       Appearance: She is obese  HENT:      Head: Normocephalic  Nose: Nose normal       Mouth/Throat:      Mouth: Mucous membranes are moist    Eyes:      Pupils: Pupils are equal, round, and reactive to light  Cardiovascular:      Rate and Rhythm: Normal rate and regular rhythm  Pulses: Normal pulses  Heart sounds: Normal heart sounds  Pulmonary:      Effort: Pulmonary effort is normal       Breath sounds: Normal breath sounds  Abdominal:      General: Abdomen is flat  Palpations: Abdomen is soft  Hernia: A hernia (Supraumbilical midline incisional hernia  The defect is about 4 cm  It is reducible  Mildly tender   ) is present  Musculoskeletal:         General: Normal range of motion  Cervical back: Normal range of motion  Skin:     General: Skin is warm  Neurological:      General: No focal deficit present  Mental Status: She is alert     Psychiatric:         Mood and Affect: Mood normal

## 2022-01-06 PROCEDURE — U0005 INFEC AGEN DETEC AMPLI PROBE: HCPCS | Performed by: FAMILY MEDICINE

## 2022-01-06 PROCEDURE — U0003 INFECTIOUS AGENT DETECTION BY NUCLEIC ACID (DNA OR RNA); SEVERE ACUTE RESPIRATORY SYNDROME CORONAVIRUS 2 (SARS-COV-2) (CORONAVIRUS DISEASE [COVID-19]), AMPLIFIED PROBE TECHNIQUE, MAKING USE OF HIGH THROUGHPUT TECHNOLOGIES AS DESCRIBED BY CMS-2020-01-R: HCPCS | Performed by: FAMILY MEDICINE

## 2022-01-13 ENCOUNTER — OFFICE VISIT (OUTPATIENT)
Dept: LAB | Facility: HOSPITAL | Age: 57
End: 2022-01-13
Attending: SURGERY
Payer: COMMERCIAL

## 2022-01-13 DIAGNOSIS — Z01.818 ENCOUNTER FOR PREADMISSION TESTING: ICD-10-CM

## 2022-01-13 PROCEDURE — 93005 ELECTROCARDIOGRAM TRACING: CPT

## 2022-01-14 ENCOUNTER — TELEPHONE (OUTPATIENT)
Dept: NEUROLOGY | Facility: CLINIC | Age: 57
End: 2022-01-14

## 2022-01-14 NOTE — TELEPHONE ENCOUNTER
Received fax from [facility] PA is about to   Key# [id number]    Chart reviewed: PA  21    PA initiated      Awaiting determination

## 2022-01-17 NOTE — TELEPHONE ENCOUNTER
Called pt, she has never tried naritriptan  She did have palpitations with maxalt also  called asif at [phone] and spoke to [facility]  States that we Can appeal     Standard appeal can take up to 7 days   Can complete appeal form attached to denial     Appeal form completed and faxed along with this encounter

## 2022-01-17 NOTE — TELEPHONE ENCOUNTER
nurtec denied as pt has not tried preferred drug-naratriptan  Per last office note-  Imitrex 100 mg (heart palp and in hospital for 4 days), Maxalt 10 mg   Is naratriptan contraindicated?

## 2022-01-17 NOTE — TELEPHONE ENCOUNTER
I am covering for Eric today  I do not know if she experienced racing heart beat with Maxalt  If she did I would say Naratriptan is contraindicated  If it simply was not effective, I can send in for Naratriptan

## 2022-01-18 LAB
ATRIAL RATE: 83 BPM
P AXIS: 61 DEGREES
PR INTERVAL: 156 MS
QRS AXIS: 49 DEGREES
QRSD INTERVAL: 88 MS
QT INTERVAL: 410 MS
QTC INTERVAL: 481 MS
T WAVE AXIS: 63 DEGREES
VENTRICULAR RATE: 83 BPM

## 2022-01-18 PROCEDURE — 93010 ELECTROCARDIOGRAM REPORT: CPT | Performed by: INTERNAL MEDICINE

## 2022-01-24 NOTE — PRE-PROCEDURE INSTRUCTIONS
My Surgical Experience    The following information was developed to assist you to prepare for your operation  What do I need to do before coming to the hospital?   Arrange for a responsible person to drive you to and from the hospital    Arrange care for your children at home  Children are not allowed in the recovery areas of the hospital   Plan to wear clothing that is easy to put on and take off  If you are having shoulder surgery, wear a shirt that buttons or zippers in the front  Bathing  o Shower the evening before and the morning of your surgery with an antibacterial soap  Please refer to the Pre Op Showering Instructions for Surgery Patients Sheet   o Remove nail polish and all body piercing jewelry  o Do not shave any body part for at least 24 hours before surgery-this includes face, arms, legs and upper body  Food  o Nothing to eat or drink after midnight the night before your surgery  This includes candy and chewing gum  o Exception: If your surgery is after 12:00pm (noon), you may have clear liquids such as 7-Up®, ginger ale, apple or cranberry juice, Jell-O®, water, or clear broth until 8:00 am  o Do not drink milk or juice with pulp on the morning before surgery  o Do not drink alcohol 24 hours before surgery  Medicine  o Follow instructions you received from your surgeon about which medicines you may take on the day of surgery  o If instructed to take medicine on the morning of surgery, take pills with just a small sip of water  Call your prescribing doctor for specific infroamtion on what to do if you take insulin    What should I bring to the hospital?    Bring:  Dave Taylor or a walker, if you have them, for foot or knee surgery   A list of the daily medicines, vitamins, minerals, herbals and nutritional supplements you take   Include the dosages of medicines and the time you take them each day   Glasses, dentures or hearing aids   Minimal clothing; you will be wearing hospital sleepwear   Photo ID; required to verify your identity   If you have a Living Will or Power of , bring a copy of the documents   If you have an ostomy, bring an extra pouch and any supplies you use    Do not bring   Medicines or inhalers   Money, valuables or jewelry    What other information should I know about the day of surgery?  Notify your surgeons if you develop a cold, sore throat, cough, fever, rash or any other illness   Report to the Ambulatory Surgical/Same Day Surgery Unit   You will be instructed to stop at Registration only if you have not been pre-registered   Inform your  fi they do not stay that they will be asked by the staff to leave a phone number where they can be reached   Be available to be reached before surgery  In the event the operating room schedule changes, you may be asked to come in earlier or later than expected    *It is important to tell your doctor and others involved in your health care if you are taking or have been taking any non-prescription drugs, vitamins, minerals, herbals or other nutritional supplements  Any of these may interact with some food or medicines and cause a reaction      Pre-Surgery Instructions:   Medication Instructions    BREO ELLIPTA Instructed patient per Anesthesia Guidelines   budesonide-formoterol (SYMBICORT) 160-4 5 mcg/act inhaler Instructed patient per Anesthesia Guidelines   clonazePAM (KlonoPIN) 0 5 mg tablet Instructed patient per Anesthesia Guidelines   FLUoxetine (PROzac) 40 MG capsule Instructed patient per Anesthesia Guidelines   ketorolac (TORADOL) 30 mg/mL injection Instructed patient per Anesthesia Guidelines   lamoTRIgine (LaMICtal) 100 mg tablet Instructed patient per Anesthesia Guidelines   PROAIR  (90 Base) MCG/ACT inhaler Instructed patient per Anesthesia Guidelines   prochlorperazine (COMPAZINE) 10 mg tablet Instructed patient per Anesthesia Guidelines      To take lamictal, clonaepam, fluoxetine and use inhaler a m  of surgery

## 2022-01-25 ENCOUNTER — TELEPHONE (OUTPATIENT)
Dept: NEUROLOGY | Facility: CLINIC | Age: 57
End: 2022-01-25

## 2022-01-25 NOTE — TELEPHONE ENCOUNTER
Called patient and I left a message to call back to see if she would like to switch her appointment tomorrow 01/26/22 at 07:30am over to a virtual vide visit

## 2022-01-25 NOTE — TELEPHONE ENCOUNTER
Called [employer] to check status  Spoke w/ Jane and states that appeal has been approved through 12/31/2022  Christina Goncalves, spoke w/ Cedrick Vincent and made aware of the approval  Copay is $100  Same amount as before  They will need to order it and should be avail tmrw  Someone from pharmacy will notify pt when med is ready for   Pt made aware and verbalized understanding  Also requesting script for [employer]  [employer] approval letter faxed to [address] [place] [address] at [address] [place] [address] at [phone], spoke w/ Shekhar Grewal and advised of the [employer]ovig approval  States that once they receive the copy of the approval, someone from their dept will reach out to the pt to schedule delivery  Pt made aware

## 2022-01-26 ENCOUNTER — OFFICE VISIT (OUTPATIENT)
Dept: NEUROLOGY | Facility: CLINIC | Age: 57
End: 2022-01-26
Payer: COMMERCIAL

## 2022-01-26 VITALS
BODY MASS INDEX: 37.93 KG/M2 | HEIGHT: 62 IN | DIASTOLIC BLOOD PRESSURE: 91 MMHG | SYSTOLIC BLOOD PRESSURE: 141 MMHG | WEIGHT: 206.1 LBS | HEART RATE: 85 BPM | TEMPERATURE: 96.9 F

## 2022-01-26 DIAGNOSIS — E55.9 VITAMIN D DEFICIENCY: Primary | ICD-10-CM

## 2022-01-26 DIAGNOSIS — G43.709 CHRONIC MIGRAINE WITHOUT AURA WITHOUT STATUS MIGRAINOSUS, NOT INTRACTABLE: ICD-10-CM

## 2022-01-26 DIAGNOSIS — E53.8 B12 DEFICIENCY: ICD-10-CM

## 2022-01-26 PROCEDURE — 99214 OFFICE O/P EST MOD 30 MIN: CPT | Performed by: PHYSICIAN ASSISTANT

## 2022-01-26 RX ORDER — KETOROLAC TROMETHAMINE 30 MG/ML
INJECTION, SOLUTION INTRAMUSCULAR; INTRAVENOUS
Qty: 6 ML | Refills: 0 | Status: SHIPPED | OUTPATIENT
Start: 2022-01-26

## 2022-01-26 RX ORDER — SYRINGE W-NEEDLE,DISPOSAB,3 ML 25GX5/8"
SYRINGE, EMPTY DISPOSABLE MISCELLANEOUS
Qty: 3 EACH | Refills: 2 | Status: SHIPPED | OUTPATIENT
Start: 2022-01-26

## 2022-01-26 NOTE — PATIENT INSTRUCTIONS
Chronic migraine headaches:  Preventive therapy for headaches:   - cyproheptadine 4 mg at bedtime   - Iamictal to 50 mg in am and 75 mg at bedtime for 7 days then increase to 50 mg in am and 100 mg at bedtime  - Aimovig every 30 days once you start  Abortive therapy for headaches:   - At onset of migraine, take Nurtec 75 mg  Limit of 1 tab in 24 hours  - Status post gastric bypass in February of 2019  - May also use Prochlorperazine to abort migraine  Not just for nausea  May repeat in 8 hours if needed  Limit of 20 a claudio  - If this is unsuccessful, may use Toradol 30 mg injections  Limit of 5 a month       Headache management instructions  - When patient has a moderate to severe headache, they should seek rest, initiate relaxation and apply cold compresses to the head  - Maintain regular sleep schedule  Adults need at least 7-8 hours of uninterrupted a night  - Limit over the counter medications such as Tylenol, Ibuprofen, Aleve, Excedrin  (No more than 3 times a week)  - Maintain headache diary   We discussed an JEREMI for a smart phone is "Migraine enly"  - Limit caffeine to 1-2 cups 8 to 16 oz a day or less  - Avoid dietary trigger  (aged cheese, peanuts, MSG, aspartame and nitrates)  - Patient is to have regular frequent meals to prevent headache onset     - Please drink at least 64 ounces of water a day to help remain hydrated      Please call with any questions or concerns   Office number is [phone]

## 2022-01-26 NOTE — ASSESSMENT & PLAN NOTE
Preventive therapy for headaches:   - cyproheptadine 4 mg at bedtime   - Iamictal to 50 mg in am and 75 mg at bedtime for 7 days then increase to 50 mg in am and 100 mg at bedtime  - Aimovig every 30 days once you start  Abortive therapy for headaches:   - At onset of migraine, take Nurtec 75 mg  Limit of 1 tab in 24 hours  - Status post gastric bypass in February of 2019  - May also use Prochlorperazine to abort migraine  Not just for nausea  May repeat in 8 hours if needed  Limit of 20 a claudio  - If this is unsuccessful, may use Toradol 30 mg injections    Limit of 5 a month

## 2022-01-27 ENCOUNTER — ANESTHESIA EVENT (OUTPATIENT)
Dept: PERIOP | Facility: HOSPITAL | Age: 57
End: 2022-01-27
Payer: COMMERCIAL

## 2022-01-27 PROBLEM — I10 HTN (HYPERTENSION): Status: ACTIVE | Noted: 2022-01-27

## 2022-01-27 PROBLEM — F41.9 ANXIETY: Status: ACTIVE | Noted: 2022-01-27

## 2022-01-27 NOTE — ANESTHESIA PREPROCEDURE EVALUATION
Procedure:  OPEN INCISIONAL HERNIA REPAIR WITH MESH (N/A Abdomen)    Relevant Problems   CARDIO   (+) Chronic migraine without aura   (+) HTN (hypertension)      GYN   (+) History of hysterectomy      NEURO/PSYCH   (+) Anxiety   (+) Chronic migraine without aura      PULMONARY   (+) Mild asthma        Physical Exam    Airway    Mallampati score: III  TM Distance: >3 FB  Neck ROM: full     Dental   upper dentures and lower dentures,     Cardiovascular  Rhythm: regular, Rate: normal,     Pulmonary  Breath sounds clear to auscultation,     Other Findings  Partial upper denture and full lower      Anesthesia Plan  ASA Score- 2     Anesthesia Type- general with ASA Monitors  Additional Monitors:   Airway Plan: ETT  Plan Factors-    Chart reviewed  Patient is not a current smoker  Induction- intravenous  Postoperative Plan- Plan for postoperative opioid use  Informed Consent- Anesthetic plan and risks discussed with patient  I personally reviewed this patient with the CRNA  Discussed and agreed on the Anesthesia Plan with the CRNA  Karrie Nissen

## 2022-01-27 NOTE — TELEPHONE ENCOUNTER
Herbert Kelly from [employer] called re: Luann Marte  States that she was on the phone w/ the pt informing her that she needs to complete enrollment form for this year but pt disconnected the call  She faxed the enrollment form and rx form to our office on 1/25/22  Advised that no form was received  She was able to guide me on how to obtain these forms online  Forms printed  States that pt must complete patient application form  Product prescription form needs to be completed by our provider and faxed [phone]  [phone]    Patient suma form mailed to the address on file  Pt made aware of all of the above  She will complete and fax to [employer]  Rx form completed  Reviewed and signed by Aisha  Fax sent to [phone]    Placed in clerical bin to be scanned

## 2022-01-28 ENCOUNTER — ANESTHESIA (OUTPATIENT)
Dept: PERIOP | Facility: HOSPITAL | Age: 57
End: 2022-01-28
Payer: COMMERCIAL

## 2022-01-28 ENCOUNTER — HOSPITAL ENCOUNTER (OUTPATIENT)
Facility: HOSPITAL | Age: 57
Setting detail: OUTPATIENT SURGERY
Discharge: HOME | End: 2022-01-29
Attending: SURGERY | Admitting: SURGERY
Payer: COMMERCIAL

## 2022-01-28 DIAGNOSIS — G89.18 POSTOPERATIVE PAIN: Primary | ICD-10-CM

## 2022-01-28 PROBLEM — J45.909 MILD ASTHMA: Status: ACTIVE | Noted: 2022-01-28

## 2022-01-28 PROBLEM — Z90.710 HISTORY OF HYSTERECTOMY: Status: ACTIVE | Noted: 2022-01-28

## 2022-01-28 PROCEDURE — C9290 INJ, BUPIVACAINE LIPOSOME: HCPCS | Performed by: ANESTHESIOLOGY

## 2022-01-28 PROCEDURE — 49560 PR REPAIR INCISIONAL HERNIA,REDUCIBLE: CPT | Performed by: SURGERY

## 2022-01-28 PROCEDURE — 49568 PR IMPLANT MESH HERNIA REPAIR/DEBRIDEMENT CLOSURE: CPT | Performed by: SURGERY

## 2022-01-28 PROCEDURE — 49560 PR REPAIR INCISIONAL HERNIA,REDUCIBLE: CPT | Performed by: PHYSICIAN ASSISTANT

## 2022-01-28 PROCEDURE — 49568 PR IMPLANT MESH HERNIA REPAIR/DEBRIDEMENT CLOSURE: CPT | Performed by: PHYSICIAN ASSISTANT

## 2022-01-28 PROCEDURE — C1781 MESH (IMPLANTABLE): HCPCS | Performed by: SURGERY

## 2022-01-28 DEVICE — VENTRALEX HERNIA PATCH, 8.0 CM (3.2"), LARGE CIRCLE WITH STRAP
Type: IMPLANTABLE DEVICE | Site: ABDOMEN | Status: FUNCTIONAL
Brand: VENTRALEX

## 2022-01-28 RX ORDER — OXYCODONE HYDROCHLORIDE 5 MG/1
5 TABLET ORAL EVERY 6 HOURS PRN
Status: DISCONTINUED | OUTPATIENT
Start: 2022-01-28 | End: 2022-01-29 | Stop reason: HOSPADM

## 2022-01-28 RX ORDER — ONDANSETRON 2 MG/ML
4 INJECTION INTRAMUSCULAR; INTRAVENOUS ONCE AS NEEDED
Status: DISCONTINUED | OUTPATIENT
Start: 2022-01-28 | End: 2022-01-28 | Stop reason: HOSPADM

## 2022-01-28 RX ORDER — OXYCODONE HYDROCHLORIDE AND ACETAMINOPHEN 5; 325 MG/1; MG/1
1 TABLET ORAL EVERY 4 HOURS PRN
Status: DISCONTINUED | OUTPATIENT
Start: 2022-01-28 | End: 2022-01-29 | Stop reason: HOSPADM

## 2022-01-28 RX ORDER — CLINDAMYCIN PHOSPHATE 600 MG/50ML
600 INJECTION INTRAVENOUS ONCE
Status: COMPLETED | OUTPATIENT
Start: 2022-01-28 | End: 2022-01-28

## 2022-01-28 RX ORDER — ROCURONIUM BROMIDE 10 MG/ML
INJECTION, SOLUTION INTRAVENOUS AS NEEDED
Status: DISCONTINUED | OUTPATIENT
Start: 2022-01-28 | End: 2022-01-28

## 2022-01-28 RX ORDER — ONDANSETRON 2 MG/ML
INJECTION INTRAMUSCULAR; INTRAVENOUS AS NEEDED
Status: DISCONTINUED | OUTPATIENT
Start: 2022-01-28 | End: 2022-01-28

## 2022-01-28 RX ORDER — SODIUM CHLORIDE, SODIUM LACTATE, POTASSIUM CHLORIDE, CALCIUM CHLORIDE 600; 310; 30; 20 MG/100ML; MG/100ML; MG/100ML; MG/100ML
75 INJECTION, SOLUTION INTRAVENOUS CONTINUOUS
Status: DISCONTINUED | OUTPATIENT
Start: 2022-01-28 | End: 2022-01-28

## 2022-01-28 RX ORDER — PROCHLORPERAZINE MALEATE 10 MG
10 TABLET ORAL EVERY 8 HOURS PRN
Status: DISCONTINUED | OUTPATIENT
Start: 2022-01-28 | End: 2022-01-29 | Stop reason: HOSPADM

## 2022-01-28 RX ORDER — CLONAZEPAM 0.5 MG/1
0.5 TABLET ORAL DAILY
Status: DISCONTINUED | OUTPATIENT
Start: 2022-01-29 | End: 2022-01-29 | Stop reason: HOSPADM

## 2022-01-28 RX ORDER — HEPARIN SODIUM 5000 [USP'U]/ML
5000 INJECTION, SOLUTION INTRAVENOUS; SUBCUTANEOUS EVERY 8 HOURS SCHEDULED
Status: DISCONTINUED | OUTPATIENT
Start: 2022-01-28 | End: 2022-01-29 | Stop reason: HOSPADM

## 2022-01-28 RX ORDER — BUPIVACAINE HYDROCHLORIDE 5 MG/ML
INJECTION, SOLUTION PERINEURAL
Status: DISCONTINUED | OUTPATIENT
Start: 2022-01-28 | End: 2022-01-28

## 2022-01-28 RX ORDER — PROPOFOL 10 MG/ML
INJECTION, EMULSION INTRAVENOUS AS NEEDED
Status: DISCONTINUED | OUTPATIENT
Start: 2022-01-28 | End: 2022-01-28

## 2022-01-28 RX ORDER — LAMOTRIGINE 25 MG/1
50 TABLET ORAL 2 TIMES DAILY
Status: DISCONTINUED | OUTPATIENT
Start: 2022-01-28 | End: 2022-01-29 | Stop reason: HOSPADM

## 2022-01-28 RX ORDER — FLUOXETINE HYDROCHLORIDE 20 MG/1
40 CAPSULE ORAL DAILY
Status: DISCONTINUED | OUTPATIENT
Start: 2022-01-29 | End: 2022-01-29 | Stop reason: HOSPADM

## 2022-01-28 RX ORDER — MIDAZOLAM HYDROCHLORIDE 2 MG/2ML
INJECTION, SOLUTION INTRAMUSCULAR; INTRAVENOUS AS NEEDED
Status: DISCONTINUED | OUTPATIENT
Start: 2022-01-28 | End: 2022-01-28

## 2022-01-28 RX ORDER — SODIUM CHLORIDE, SODIUM LACTATE, POTASSIUM CHLORIDE, CALCIUM CHLORIDE 600; 310; 30; 20 MG/100ML; MG/100ML; MG/100ML; MG/100ML
50 INJECTION, SOLUTION INTRAVENOUS CONTINUOUS
Status: DISCONTINUED | OUTPATIENT
Start: 2022-01-28 | End: 2022-01-29

## 2022-01-28 RX ORDER — BUDESONIDE AND FORMOTEROL FUMARATE DIHYDRATE 160; 4.5 UG/1; UG/1
2 AEROSOL RESPIRATORY (INHALATION) 2 TIMES DAILY
Status: DISCONTINUED | OUTPATIENT
Start: 2022-01-28 | End: 2022-01-29 | Stop reason: HOSPADM

## 2022-01-28 RX ORDER — MAGNESIUM HYDROXIDE 1200 MG/15ML
LIQUID ORAL AS NEEDED
Status: DISCONTINUED | OUTPATIENT
Start: 2022-01-28 | End: 2022-01-28 | Stop reason: HOSPADM

## 2022-01-28 RX ORDER — FENTANYL CITRATE/PF 50 MCG/ML
25 SYRINGE (ML) INJECTION
Status: COMPLETED | OUTPATIENT
Start: 2022-01-28 | End: 2022-01-28

## 2022-01-28 RX ORDER — HYDROMORPHONE HCL/PF 1 MG/ML
0.2 SYRINGE (ML) INJECTION EVERY 4 HOURS PRN
Status: DISCONTINUED | OUTPATIENT
Start: 2022-01-28 | End: 2022-01-29 | Stop reason: HOSPADM

## 2022-01-28 RX ORDER — CYPROHEPTADINE HYDROCHLORIDE 4 MG/1
4 TABLET ORAL
Status: DISCONTINUED | OUTPATIENT
Start: 2022-01-28 | End: 2022-01-29 | Stop reason: HOSPADM

## 2022-01-28 RX ORDER — HYDROMORPHONE HCL/PF 1 MG/ML
SYRINGE (ML) INJECTION AS NEEDED
Status: DISCONTINUED | OUTPATIENT
Start: 2022-01-28 | End: 2022-01-28

## 2022-01-28 RX ORDER — BUPIVACAINE HYDROCHLORIDE AND EPINEPHRINE 2.5; 5 MG/ML; UG/ML
INJECTION, SOLUTION EPIDURAL; INFILTRATION; INTRACAUDAL; PERINEURAL AS NEEDED
Status: DISCONTINUED | OUTPATIENT
Start: 2022-01-28 | End: 2022-01-28 | Stop reason: HOSPADM

## 2022-01-28 RX ORDER — LIDOCAINE HYDROCHLORIDE 10 MG/ML
INJECTION, SOLUTION EPIDURAL; INFILTRATION; INTRACAUDAL; PERINEURAL AS NEEDED
Status: DISCONTINUED | OUTPATIENT
Start: 2022-01-28 | End: 2022-01-28

## 2022-01-28 RX ORDER — DEXAMETHASONE SODIUM PHOSPHATE 4 MG/ML
INJECTION, SOLUTION INTRA-ARTICULAR; INTRALESIONAL; INTRAMUSCULAR; INTRAVENOUS; SOFT TISSUE AS NEEDED
Status: DISCONTINUED | OUTPATIENT
Start: 2022-01-28 | End: 2022-01-28

## 2022-01-28 RX ORDER — ALBUTEROL SULFATE 90 UG/1
2 AEROSOL, METERED RESPIRATORY (INHALATION) EVERY 6 HOURS PRN
Status: DISCONTINUED | OUTPATIENT
Start: 2022-01-28 | End: 2022-01-29 | Stop reason: HOSPADM

## 2022-01-28 RX ADMIN — FENTANYL CITRATE 25 MCG: 50 INJECTION INTRAMUSCULAR; INTRAVENOUS at 10:43

## 2022-01-28 RX ADMIN — OXYCODONE HYDROCHLORIDE AND ACETAMINOPHEN 1 TABLET: 5; 325 TABLET ORAL at 23:43

## 2022-01-28 RX ADMIN — FENTANYL CITRATE 25 MCG: 50 INJECTION INTRAMUSCULAR; INTRAVENOUS at 10:04

## 2022-01-28 RX ADMIN — CYPROHEPTADINE HYDROCHLORIDE 4 MG: 4 TABLET ORAL at 22:24

## 2022-01-28 RX ADMIN — OXYCODONE HYDROCHLORIDE AND ACETAMINOPHEN 1 TABLET: 5; 325 TABLET ORAL at 17:06

## 2022-01-28 RX ADMIN — CLINDAMYCIN PHOSPHATE 600 MG: 600 INJECTION, SOLUTION INTRAVENOUS at 08:12

## 2022-01-28 RX ADMIN — LIDOCAINE HYDROCHLORIDE 50 MG: 10 INJECTION, SOLUTION EPIDURAL; INFILTRATION; INTRACAUDAL; PERINEURAL at 08:17

## 2022-01-28 RX ADMIN — FENTANYL CITRATE 25 MCG: 50 INJECTION INTRAMUSCULAR; INTRAVENOUS at 10:24

## 2022-01-28 RX ADMIN — DEXAMETHASONE SODIUM PHOSPHATE 8 MG: 4 INJECTION, SOLUTION INTRA-ARTICULAR; INTRALESIONAL; INTRAMUSCULAR; INTRAVENOUS; SOFT TISSUE at 08:30

## 2022-01-28 RX ADMIN — LAMOTRIGINE 50 MG: 25 TABLET ORAL at 17:01

## 2022-01-28 RX ADMIN — HYDROMORPHONE HYDROCHLORIDE 0.5 MG: 1 INJECTION, SOLUTION INTRAMUSCULAR; INTRAVENOUS; SUBCUTANEOUS at 08:22

## 2022-01-28 RX ADMIN — FENTANYL CITRATE 25 MCG: 50 INJECTION INTRAMUSCULAR; INTRAVENOUS at 10:58

## 2022-01-28 RX ADMIN — HYDROMORPHONE HYDROCHLORIDE 0.5 MG: 1 INJECTION, SOLUTION INTRAMUSCULAR; INTRAVENOUS; SUBCUTANEOUS at 08:17

## 2022-01-28 RX ADMIN — BUPIVACAINE HYDROCHLORIDE 20 ML: 5 INJECTION, SOLUTION PERINEURAL at 11:24

## 2022-01-28 RX ADMIN — SODIUM CHLORIDE, SODIUM LACTATE, POTASSIUM CHLORIDE, AND CALCIUM CHLORIDE 50 ML/HR: .6; .31; .03; .02 INJECTION, SOLUTION INTRAVENOUS at 10:30

## 2022-01-28 RX ADMIN — SUGAMMADEX 400 MG: 100 INJECTION, SOLUTION INTRAVENOUS at 09:43

## 2022-01-28 RX ADMIN — BUDESONIDE AND FORMOTEROL FUMARATE DIHYDRATE 2 PUFF: 160; 4.5 AEROSOL RESPIRATORY (INHALATION) at 17:07

## 2022-01-28 RX ADMIN — ONDANSETRON 4 MG: 2 INJECTION INTRAMUSCULAR; INTRAVENOUS at 08:30

## 2022-01-28 RX ADMIN — HEPARIN SODIUM 5000 UNITS: 5000 INJECTION INTRAVENOUS; SUBCUTANEOUS at 16:57

## 2022-01-28 RX ADMIN — BUPIVACAINE 20 ML: 13.3 INJECTION, SUSPENSION, LIPOSOMAL INFILTRATION at 09:42

## 2022-01-28 RX ADMIN — OXYCODONE HYDROCHLORIDE 5 MG: 5 TABLET ORAL at 14:35

## 2022-01-28 RX ADMIN — SODIUM CHLORIDE, SODIUM LACTATE, POTASSIUM CHLORIDE, AND CALCIUM CHLORIDE 75 ML/HR: .6; .31; .03; .02 INJECTION, SOLUTION INTRAVENOUS at 06:56

## 2022-01-28 RX ADMIN — ROCURONIUM BROMIDE 50 MG: 10 INJECTION, SOLUTION INTRAVENOUS at 08:17

## 2022-01-28 RX ADMIN — HYDROMORPHONE HYDROCHLORIDE 0.2 MG: 1 INJECTION, SOLUTION INTRAMUSCULAR; INTRAVENOUS; SUBCUTANEOUS at 19:47

## 2022-01-28 RX ADMIN — PROPOFOL 200 MG: 10 INJECTION, EMULSION INTRAVENOUS at 08:17

## 2022-01-28 RX ADMIN — MIDAZOLAM 2 MG: 1 INJECTION INTRAMUSCULAR; INTRAVENOUS at 08:12

## 2022-01-28 NOTE — OP NOTE
PERATIVE REPORT  PATIENT NAME: Will Munoz    :  1965  MRN: [id number]  Pt Location: [facility] OR ROOM 03    SURGERY DATE: 2022    Surgeon(s) and Role: Nena Burch MD - Primary     * Vargas Moya PA-C - Assisting    Preop Diagnosis:  Incisional hernia [K43 2]    Post-Op Diagnosis Codes:     * Incisional hernia [K43 2]    Procedure(s) (LRB):  OPEN INCISIONAL HERNIA REPAIR WITH MESH (N/A)    Specimen(s):  * No specimens in log *    Estimated Blood Loss:   Minimal    Drains:  * No LDAs found *    Anesthesia Type:   General    Operative Indications:  Incisional hernia [K43 2]      Operative Findings:  4 cm incisional hernia containing omentum  Complications:   None    Procedure and Technique:  The patient was brought to the operating room and identified correctly by myself and the operating room staff  General anesthesia was given by anesthesia team   Parts were prepped and draped in the standard fashion  A time-out was performed  Patient received preoperative IV antibiotics  Supraumbilical 5 cm midline incision over the previous incisional scar was made after giving local anesthesia at the incision site  It was deepened through soft tissue  The hernia sac was identified  The fascia around the hernia sac was cleared of all the fat using electrocautery  The hernia sac was then opened  The underside of the hernia sac in the fascia was cleared with sharp dissection  All the bleeding points were controlled by using Vicryl ties  Irrigation of the cavity was performed  An 8 cm Ventralex hernia patch lot number [id number] was then introduced intraperitoneal E and was placed in an underlay position  Multiple interrupted 1  Ethibond sutures were taken to suture and close the fascia in a transverse fashion  The sutures took a bite of the superior leaflet of the mesh in a way that mesh was fixed to the undersurface of the fascia  This completed our underlay mesh repair    The subcutaneous cavity was then irrigated with saline  Hemostasis was ensured  The subcutaneous tissue was approximated using interrupted 3-0 Vicryl  The skin was closed using 4-0 Monocryl in a subcuticular fashion  The anesthesia then took over to perform at T AP P block  The patient was extubated and taken to the recovery under stable condition     I was present for the entire procedure, A qualified resident physician was not available and A physician assistant was required during the procedure for retraction tissue handling,dissection and suturing    Patient Disposition:  PACU       SIGNATURE: Kyle Boland MD  DATE: January 28, 2022  TIME: 9:24 AM

## 2022-01-28 NOTE — PLAN OF CARE
Problem: Nutrition/Hydration-ADULT  Goal: Nutrient/Hydration intake appropriate for improving, restoring or maintaining nutritional needs  Description: Monitor and assess patient's nutrition/hydration status for malnutrition  Collaborate with interdisciplinary team and initiate plan and interventions as ordered  Monitor patient's weight and dietary intake as ordered or per policy  Utilize nutrition screening tool and intervene as necessary  Determine patient's food preferences and provide high-protein, high-caloric foods as appropriate       INTERVENTIONS:  - Monitor oral intake, urinary output, labs, and treatment plans  - Assess nutrition and hydration status and recommend course of action  - Evaluate amount of meals eaten  - Assist patient with eating if necessary   - Allow adequate time for meals  - Recommend/ encourage appropriate diets, oral nutritional supplements, and vitamin/mineral supplements  - Order, calculate, and assess calorie counts as needed  - Recommend, monitor, and adjust tube feedings and TPN/PPN based on assessed needs  - Assess need for intravenous fluids  - Provide specific nutrition/hydration education as appropriate  - Include patient/family/caregiver in decisions related to nutrition  1/28/2022 1535 by Jagdish Burden RN  Outcome: Progressing  1/28/2022 1516 by Jagdish Bruden RN  Outcome: Progressing     Problem: GASTROINTESTINAL - ADULT  Goal: Minimal or absence of nausea and/or vomiting  Description: INTERVENTIONS:  - Administer IV fluids if ordered to ensure adequate hydration  - Maintain NPO status until nausea and vomiting are resolved  - Nasogastric tube if ordered  - Administer ordered antiemetic medications as needed  - Provide nonpharmacologic comfort measures as appropriate  - Advance diet as tolerated, if ordered  - Consider nutrition services referral to assist patient with adequate nutrition and appropriate food choices  1/28/2022 1535 by Jagdish Burden RN  Outcome: Progressing  1/28/2022 1516 by aJmi Gupat RN  Outcome: Progressing  Goal: Maintains or returns to baseline bowel function  Description: INTERVENTIONS:  - Assess bowel function  - Encourage oral fluids to ensure adequate hydration  - Administer IV fluids if ordered to ensure adequate hydration  - Administer ordered medications as needed  - Encourage mobilization and activity  - Consider nutritional services referral to assist patient with adequate nutrition and appropriate food choices  1/28/2022 1535 by Jami Gupta RN  Outcome: Progressing  1/28/2022 1516 by Jami Gupta RN  Outcome: Progressing  Goal: Maintains adequate nutritional intake  Description: INTERVENTIONS:  - Monitor percentage of each meal consumed  - Identify factors contributing to decreased intake, treat as appropriate  - Assist with meals as needed  - Monitor I&O, weight, and lab values if indicated  - Obtain nutrition services referral as needed  1/28/2022 1535 by Jami Gupta RN  Outcome: Progressing  1/28/2022 1516 by Jami Gupta RN  Outcome: Progressing  Goal: Establish and maintain optimal ostomy function  Description: INTERVENTIONS:  - Assess bowel function  - Encourage oral fluids to ensure adequate hydration  - Administer IV fluids if ordered to ensure adequate hydration   - Administer ordered medications as needed  - Encourage mobilization and activity  - Nutrition services referral to assist patient with appropriate food choices  - Assess stoma site  - Consider wound care consult   1/28/2022 1535 by Jami Gupta RN  Outcome: Progressing  1/28/2022 1516 by Jami Gupta RN  Outcome: Progressing  Goal: Oral mucous membranes remain intact  Description: INTERVENTIONS  - Assess oral mucosa and hygiene practices  - Implement preventative oral hygiene regimen  - Implement oral medicated treatments as ordered  - Initiate Nutrition services referral as needed  1/28/2022 1535 by Jami Gupta RN  Outcome: Progressing  1/28/2022 1516 by Olivier Garza, RN  Outcome: Progressing

## 2022-01-28 NOTE — INTERVAL H&P NOTE
H&P reviewed  After examining the patient I find no changes in the patients condition since the H&P had been written  Vitals:    01/28/22 0640   BP: 136/83   Pulse: 90   Resp: 18   Temp: 98 2 °F (36 8 °C)   SpO2: 97%   Heart regular rate  Lungs: No respiratory distress

## 2022-01-28 NOTE — ANESTHESIA POSTPROCEDURE EVALUATION
Post-Op Assessment Note    CV Status:  Stable       Mental Status:  Sleepy   Hydration Status:  Stable   PONV Controlled:  Controlled   Airway Patency:  Patent      Post Op Vitals Reviewed: Yes      Staff: CRNA         No complications documented      /62 (01/28/22 0957)    Temp 98 °F (36 7 °C) (01/28/22 0957)    Pulse 100 (01/28/22 0957)   Resp      SpO2

## 2022-01-28 NOTE — ANESTHESIA PROCEDURE NOTES
Peripheral Block    Patient location during procedure: OR  Start time: 1/28/2022 9:42 AM  Reason for block: procedure for pain, at surgeon's request and post-op pain management  Staffing  Anesthesiologist: Paige Weston MD  Resident/CRNA: Marlene Davis CRNA  Preanesthetic Checklist  Completed: patient identified, IV checked, site marked, risks and benefits discussed, surgical consent, monitors and equipment checked, pre-op evaluation and timeout performed  Peripheral Block  Patient position: supine  Prep: ChloraPrep  Patient monitoring: heart rate, cardiac monitor, continuous pulse ox and frequent blood pressure checks  Block type: TAP  Laterality: bilateral  Injection technique: single-shot  Procedures: ultrasound guided, Ultrasound guidance required for the procedure to increase accuracy and safety of medication placement and decrease risk of complications    Ultrasound permanent image savedbupivacaine (MARCAINE) 0 5 % perineural infiltration, 20 mL (with 20 ml of exparel and 20 ml of NS (30 ml injected on each side))  Needle  Needle type: Stimuplex   Needle gauge: 22 G  Needle length: 10 cm  Needle localization: ultrasound guidance  Assessment  Injection assessment: incremental injection, negative aspiration for heme and no paresthesia on injection  Paresthesia pain: none  Heart rate change: no  Slow fractionated injection: yes  Post-procedure:  site cleaned  patient tolerated the procedure well with no immediate complications

## 2022-01-29 VITALS
OXYGEN SATURATION: 94 % | HEART RATE: 97 BPM | SYSTOLIC BLOOD PRESSURE: 120 MMHG | DIASTOLIC BLOOD PRESSURE: 70 MMHG | TEMPERATURE: 98.5 F | BODY MASS INDEX: 37.17 KG/M2 | WEIGHT: 202 LBS | RESPIRATION RATE: 18 BRPM | HEIGHT: 62 IN

## 2022-01-29 PROBLEM — K43.2 INCISIONAL HERNIA: Status: ACTIVE | Noted: 2022-01-29

## 2022-01-29 PROCEDURE — 99024 POSTOP FOLLOW-UP VISIT: CPT | Performed by: SURGERY

## 2022-01-29 RX ORDER — OXYCODONE HYDROCHLORIDE 5 MG/1
5 TABLET ORAL EVERY 6 HOURS PRN
Qty: 6 TABLET | Refills: 0 | Status: SHIPPED | OUTPATIENT
Start: 2022-01-29 | End: 2022-02-05

## 2022-01-29 RX ADMIN — BUDESONIDE AND FORMOTEROL FUMARATE DIHYDRATE 2 PUFF: 160; 4.5 AEROSOL RESPIRATORY (INHALATION) at 09:02

## 2022-01-29 RX ADMIN — HEPARIN SODIUM 5000 UNITS: 5000 INJECTION INTRAVENOUS; SUBCUTANEOUS at 05:15

## 2022-01-29 RX ADMIN — OXYCODONE HYDROCHLORIDE AND ACETAMINOPHEN 1 TABLET: 5; 325 TABLET ORAL at 05:15

## 2022-01-29 RX ADMIN — CLONAZEPAM 0.5 MG: 0.5 TABLET ORAL at 09:00

## 2022-01-29 RX ADMIN — LAMOTRIGINE 50 MG: 25 TABLET ORAL at 09:02

## 2022-01-29 RX ADMIN — FLUOXETINE 40 MG: 20 CAPSULE ORAL at 09:01

## 2022-01-29 NOTE — DISCHARGE INSTRUCTIONS
Postoperative Care Instructions      1  General: You may feel pulling sensations around the wound or funny aches and pains around the incisions  This is normal  Even minor surgery is a change in your body and this is your body's reaction to it  If you have had abdominal surgery, it may help to support the incision with a small pillow or blanket for comfort when moving or coughing  2  Wound care: The glue over the incisions will fall off over the next week or two  If you have staples or stitches, they will be removed by the physician at your follow up appointment  3  Showering: You may shower again starting 1/29  Please do not soak wound in standing water such as a bath, hot tub, pool, lake, etc  Do not scrub or use exfoliants on the surgical wounds  4  Activity: You may go up and down stairs, walk as much as you are comfortable, but walk at least 3 times each day  If you have had abdominal surgery, do not perform any strenuous exercise or lift anything heavier than 15 pounds for at least 4 weeks, unless cleared by your physician  5  Diet: You may resume your regular diet  Please drink lots of water  6  Medications: Resume all of your previous medications, unless told otherwise by the doctor  A good option for pain control is to start with acetaminophen(Tylenol) 650mg every 6 hours  If this is not sufficient then you make take the narcotic pain medicine as prescribed  You do not need to take the narcotic pain medication (oxycodone) unless you are having significant pain and discomfort  Please take the narcotic medication with food  Insure that you do not take more than 4000 mg of Tylenol per day  7  Driving: You will need someone to drive you home on the day of surgery  Do not drive or make any important decisions while on narcotic pain medication  Generally, you may drive 48 hours after you've stopped taking all narcotic pain medications      8  Upset Stomach: You may take Maalox, Tums, or similar items for an upset stomach  If your narcotic pain medication causes an upset stomach, do not take it on an empty stomach  Try taking it with at least some crackers or toast      9  Constipation: Patients often experience constipation after surgery  We recommend starting an over-the-counter medication for this, such as Metamucil, Senokot, Colace, milk of magnesia, etc  You may stop taking these medications a couple days after your last dose of narcotic medication  If you experience significant nausea or vomiting after abdominal surgery, call the office before trying any of these medications  10  Call the office: If you are experiencing any of the following: fevers above 101 5°, significant nausea or vomiting, if the wound develops drainage and/or excessive redness around the wound, or if you have significant diarrhea or other worsening symptoms      11  Pain: A prescription for narcotic pain medication will be sent to your pharmacy upon discharge from the hospital

## 2022-01-29 NOTE — PROGRESS NOTES
Progress Note - General Surgery   [facility] 55 y o  female MRN: [id number]  Unit/Bed#: 2 [place] [address] Encounter: [id number]    Assessment:  POD#1 s/p incisional hernia repair with mesh    Plan:  Home medications restarted  Discontinue IV fluids  Regular diet  PRN analgesia  Abdominal binder  Discussed postoperative care instructions with patient in detail  Discharge today      Subjective/Objective     Subjective:  Patient reports her pain is greatly improved since yesterday  Denies any nausea, vomiting, chest pain, SOB, difficulty breathing  She is ready to go home    Objective:   AVSS     Blood pressure 124/72, pulse 86, temperature 98 1 °F (36 7 °C), resp  rate 16, height 5' 2" (1 575 m), weight 91 6 kg (202 lb), SpO2 98 %  ,Body mass index is 36 95 kg/m²  Intake/Output Summary (Last 24 hours) at 1/29/2022 0815  Last data filed at 1/28/2022 1030  Gross per 24 hour   Intake 1000 ml   Output --   Net 1000 ml       Invasive Devices  Report    Peripheral Intravenous Line            Peripheral IV 01/28/22 Left Hand 1 day                Physical Exam: /72   Pulse 86   Temp 98 1 °F (36 7 °C)   Resp 16   Ht 5' 2" (1 575 m)   Wt 91 6 kg (202 lb)   SpO2 98%   BMI 36 95 kg/m²   General appearance: alert and oriented, in no acute distress  Head: atraumatic  Lungs: clear to auscultation bilaterally  Heart: regular rate and rhythm, S1, S2 normal, no murmur, click, rub or gallop  Abdomen: Soft, nondistended, no guarding, incision intact with skin glue, Abdominal binder in place  Extremities: extremities normal, warm and well-perfused; no cyanosis, clubbing, or edema  Skin: Skin color, texture, turgor normal  No rashes or lesions    Lab, Imaging and other studies:I have personally reviewed pertinent lab results      VTE Pharmacologic Prophylaxis: Heparin SQ  VTE Mechanical Prophylaxis: sequential compression device

## 2022-01-29 NOTE — PLAN OF CARE
Problem: Nutrition/Hydration-ADULT  Goal: Nutrient/Hydration intake appropriate for improving, restoring or maintaining nutritional needs  Description: Monitor and assess patient's nutrition/hydration status for malnutrition  Collaborate with interdisciplinary team and initiate plan and interventions as ordered  Monitor patient's weight and dietary intake as ordered or per policy  Utilize nutrition screening tool and intervene as necessary  Determine patient's food preferences and provide high-protein, high-caloric foods as appropriate       INTERVENTIONS:  - Monitor oral intake, urinary output, labs, and treatment plans  - Assess nutrition and hydration status and recommend course of action  - Evaluate amount of meals eaten  - Assist patient with eating if necessary   - Allow adequate time for meals  - Recommend/ encourage appropriate diets, oral nutritional supplements, and vitamin/mineral supplements  - Order, calculate, and assess calorie counts as needed  - Recommend, monitor, and adjust tube feedings and TPN/PPN based on assessed needs  - Assess need for intravenous fluids  - Provide specific nutrition/hydration education as appropriate  - Include patient/family/caregiver in decisions related to nutrition  Outcome: Progressing     Problem: GASTROINTESTINAL - ADULT  Goal: Minimal or absence of nausea and/or vomiting  Description: INTERVENTIONS:  - Administer IV fluids if ordered to ensure adequate hydration  - Maintain NPO status until nausea and vomiting are resolved  - Nasogastric tube if ordered  - Administer ordered antiemetic medications as needed  - Provide nonpharmacologic comfort measures as appropriate  - Advance diet as tolerated, if ordered  - Consider nutrition services referral to assist patient with adequate nutrition and appropriate food choices  Outcome: Progressing  Goal: Maintains or returns to baseline bowel function  Description: INTERVENTIONS:  - Assess bowel function  - Encourage oral fluids to ensure adequate hydration  - Administer IV fluids if ordered to ensure adequate hydration  - Administer ordered medications as needed  - Encourage mobilization and activity  - Consider nutritional services referral to assist patient with adequate nutrition and appropriate food choices  Outcome: Progressing  Goal: Maintains adequate nutritional intake  Description: INTERVENTIONS:  - Monitor percentage of each meal consumed  - Identify factors contributing to decreased intake, treat as appropriate  - Assist with meals as needed  - Monitor I&O, weight, and lab values if indicated  - Obtain nutrition services referral as needed  Outcome: Progressing  Goal: Establish and maintain optimal ostomy function  Description: INTERVENTIONS:  - Assess bowel function  - Encourage oral fluids to ensure adequate hydration  - Administer IV fluids if ordered to ensure adequate hydration   - Administer ordered medications as needed  - Encourage mobilization and activity  - Nutrition services referral to assist patient with appropriate food choices  - Assess stoma site  - Consider wound care consult   Outcome: Progressing  Goal: Oral mucous membranes remain intact  Description: INTERVENTIONS  - Assess oral mucosa and hygiene practices  - Implement preventative oral hygiene regimen  - Implement oral medicated treatments as ordered  - Initiate Nutrition services referral as needed  Outcome: Progressing

## 2022-02-07 ENCOUNTER — OFFICE VISIT (OUTPATIENT)
Dept: SURGERY | Facility: CLINIC | Age: 57
End: 2022-02-07

## 2022-02-07 VITALS
HEART RATE: 91 BPM | DIASTOLIC BLOOD PRESSURE: 72 MMHG | OXYGEN SATURATION: 99 % | SYSTOLIC BLOOD PRESSURE: 129 MMHG | TEMPERATURE: 97.6 F | BODY MASS INDEX: 35.26 KG/M2 | HEIGHT: 63 IN | WEIGHT: 199 LBS | RESPIRATION RATE: 18 BRPM

## 2022-02-07 DIAGNOSIS — K43.2 INCISIONAL HERNIA: Primary | ICD-10-CM

## 2022-02-07 PROCEDURE — 99024 POSTOP FOLLOW-UP VISIT: CPT | Performed by: SURGERY

## 2022-02-07 NOTE — PROGRESS NOTES
Assessment/Plan:  She is doing very well  I told her not to lift anything over 15 lb for another month  Follow-up with me in 1 month or p r n  No problem-specific Assessment & Plan notes found for this encounter  There are no diagnoses linked to this encounter  Subjective:      Patient ID: Cinthia Rene is a 55 y o  female  62-year-old female patient who is 10 days status post incisional hernia repair with mesh  She is doing well  No pain  No nausea or vomiting  Tolerating diet  The following portions of the patient's history were reviewed and updated as appropriate: allergies, current medications, past family history, past medical history, past social history, past surgical history and problem list     Review of Systems   All other systems reviewed and are negative  Objective:      /72 (BP Location: Right arm, Patient Position: Sitting, Cuff Size: Adult)   Pulse 91   Temp 97 6 °F (36 4 °C)   Resp 18   Ht 5' 2 5" (1 588 m)   Wt 90 3 kg (199 lb)   SpO2 99%   BMI 35 82 kg/m²          Physical Exam  Vitals reviewed  HENT:      Nose: Nose normal    Abdominal:      General: Bowel sounds are normal       Palpations: Abdomen is soft  There is no mass  Hernia: No hernia is present  Comments: Incision healing well   Neurological:      Mental Status: She is alert

## 2022-03-09 ENCOUNTER — OFFICE VISIT (OUTPATIENT)
Dept: SURGERY | Facility: CLINIC | Age: 57
End: 2022-03-09

## 2022-03-09 VITALS
HEART RATE: 80 BPM | TEMPERATURE: 97.7 F | DIASTOLIC BLOOD PRESSURE: 80 MMHG | RESPIRATION RATE: 18 BRPM | HEIGHT: 63 IN | BODY MASS INDEX: 35.61 KG/M2 | SYSTOLIC BLOOD PRESSURE: 114 MMHG | WEIGHT: 201 LBS | OXYGEN SATURATION: 98 %

## 2022-03-09 DIAGNOSIS — K43.2 INCISIONAL HERNIA: Primary | ICD-10-CM

## 2022-03-09 PROCEDURE — 99024 POSTOP FOLLOW-UP VISIT: CPT | Performed by: SURGERY

## 2022-03-09 NOTE — PROGRESS NOTES
Assessment/Plan:  She is doing very well  Follow-up with me p r n  No restrictions  No problem-specific Assessment & Plan notes found for this encounter  Diagnoses and all orders for this visit:    Incisional hernia          Subjective:      Patient ID: Leisa Arreguin is a 55 y o  female  43-year-old female to male patient who is 6 weeks status post open repair of incisional hernia with mesh  Doing well  She says she is having a migraine episode right now but no abdominal pain  No nausea or vomiting  Tolerating diet  She palpated a lump around her incision but she says that lump has gone  The following portions of the patient's history were reviewed and updated as appropriate: allergies, current medications, past family history, past medical history, past social history, past surgical history and problem list     Review of Systems   All other systems reviewed and are negative  Objective:      /80 (BP Location: Left arm, Patient Position: Sitting, Cuff Size: Adult)   Pulse 80   Temp 97 7 °F (36 5 °C)   Resp 18   Ht 5' 2 5" (1 588 m)   Wt 91 2 kg (201 lb)   SpO2 98%   BMI 36 18 kg/m²          Physical Exam  Vitals reviewed  Abdominal:      General: Bowel sounds are normal  There is no distension  Palpations: Abdomen is soft  There is no mass  Tenderness: There is no abdominal tenderness  Hernia: No hernia is present

## 2022-03-19 ENCOUNTER — HOSPITAL ENCOUNTER (EMERGENCY)
Facility: HOSPITAL | Age: 57
Discharge: HOME | End: 2022-03-19
Attending: EMERGENCY MEDICINE
Payer: COMMERCIAL

## 2022-03-19 VITALS
OXYGEN SATURATION: 97 % | BODY MASS INDEX: 37 KG/M2 | DIASTOLIC BLOOD PRESSURE: 60 MMHG | RESPIRATION RATE: 18 BRPM | TEMPERATURE: 98.2 F | WEIGHT: 201.06 LBS | SYSTOLIC BLOOD PRESSURE: 134 MMHG | HEIGHT: 62 IN | HEART RATE: 88 BPM

## 2022-03-19 DIAGNOSIS — G43.919 INTRACTABLE MIGRAINE WITHOUT STATUS MIGRAINOSUS, UNSPECIFIED MIGRAINE TYPE: Primary | ICD-10-CM

## 2022-03-19 PROCEDURE — 96375 TX/PRO/DX INJ NEW DRUG ADDON: CPT

## 2022-03-19 PROCEDURE — 99283 EMERGENCY DEPT VISIT LOW MDM: CPT

## 2022-03-19 PROCEDURE — 96367 TX/PROPH/DG ADDL SEQ IV INF: CPT

## 2022-03-19 PROCEDURE — 96365 THER/PROPH/DIAG IV INF INIT: CPT

## 2022-03-19 PROCEDURE — 99284 EMERGENCY DEPT VISIT MOD MDM: CPT | Performed by: PHYSICIAN ASSISTANT

## 2022-03-19 RX ORDER — METOCLOPRAMIDE HYDROCHLORIDE 5 MG/ML
10 INJECTION INTRAMUSCULAR; INTRAVENOUS EVERY 8 HOURS SCHEDULED
Status: DISCONTINUED | OUTPATIENT
Start: 2022-03-19 | End: 2022-03-19 | Stop reason: HOSPADM

## 2022-03-19 RX ORDER — DEXAMETHASONE SODIUM PHOSPHATE 10 MG/ML
10 INJECTION, SOLUTION INTRAMUSCULAR; INTRAVENOUS ONCE
Status: COMPLETED | OUTPATIENT
Start: 2022-03-19 | End: 2022-03-19

## 2022-03-19 RX ORDER — KETOROLAC TROMETHAMINE 30 MG/ML
15 INJECTION, SOLUTION INTRAMUSCULAR; INTRAVENOUS EVERY 8 HOURS
Status: DISCONTINUED | OUTPATIENT
Start: 2022-03-19 | End: 2022-03-19 | Stop reason: HOSPADM

## 2022-03-19 RX ORDER — DIPHENHYDRAMINE HYDROCHLORIDE 50 MG/ML
25 INJECTION INTRAMUSCULAR; INTRAVENOUS EVERY 8 HOURS PRN
Status: DISCONTINUED | OUTPATIENT
Start: 2022-03-19 | End: 2022-03-19 | Stop reason: HOSPADM

## 2022-03-19 RX ORDER — MAGNESIUM SULFATE HEPTAHYDRATE 40 MG/ML
2 INJECTION, SOLUTION INTRAVENOUS
Status: DISCONTINUED | OUTPATIENT
Start: 2022-03-19 | End: 2022-03-19 | Stop reason: HOSPADM

## 2022-03-19 RX ADMIN — MAGNESIUM SULFATE HEPTAHYDRATE 2 G: 40 INJECTION, SOLUTION INTRAVENOUS at 15:33

## 2022-03-19 RX ADMIN — METOCLOPRAMIDE HYDROCHLORIDE 10 MG: 5 INJECTION INTRAMUSCULAR; INTRAVENOUS at 15:30

## 2022-03-19 RX ADMIN — KETOROLAC TROMETHAMINE 15 MG: 30 INJECTION, SOLUTION INTRAMUSCULAR at 15:28

## 2022-03-19 RX ADMIN — DIPHENHYDRAMINE HYDROCHLORIDE 25 MG: 50 INJECTION, SOLUTION INTRAMUSCULAR; INTRAVENOUS at 15:26

## 2022-03-19 RX ADMIN — DEXAMETHASONE SODIUM PHOSPHATE 10 MG: 10 INJECTION, SOLUTION INTRAMUSCULAR; INTRAVENOUS at 16:51

## 2022-03-19 RX ADMIN — SODIUM CHLORIDE 1000 MG: 9 INJECTION, SOLUTION INTRAVENOUS at 17:19

## 2022-03-19 NOTE — DISCHARGE INSTRUCTIONS
Please return to the emergency department for worsening symptoms including chest pain, shortness of breath, dizziness, lightheadedness, fever greater than 103, severe pain, inability to walk, fainting episodes, etc  Please follow-up with your family practice provider as soon as possible  Please continue to take your daily migraine medications and here abortive migraine medications as your neurologist discussed with you  Please follow-up with your neurologist as soon as possible  You may need to change to a migraine medications  Please use Tylenol and ibuprofen as prescribed  Please see the back of the bottle for dosing instructions

## 2022-03-20 NOTE — ED PROVIDER NOTES
History  Chief Complaint   Patient presents with    Migraine     Pt with history of migraines on day 10 of headache  Pt reports taking OTC without relief  denies weakness  Pt reports taht she is taking tylenol and motrin more than prescribed  + sensitivity to sound and smell      This is a 50-year-old female with past medical history significant for migraine headaches currently on Aimovig, cyproheptadine, Lamictal, Compazine, Toradol, and other migraine medications presenting to the emergency department today for a migraine times 10 days  She notes it is left-sided and associated with photophobia, phonophobia, and changes to smell  She notes this migraine is similar to all the other migraines that she has had  This is not a thunderclap headache  She denies any chest pain or shortness of breath  She has no aura associated with her migraines  She denies any nausea, vomiting, diarrhea, or constipation  She does follow-up with Neurology for her chronic migraine headaches but notes that the Tylenol and ibuprofen that she has been taking at home has not helped for her migraines  She denies any urinary symptoms  The patient notes that typically she comes to the emergency department for a migraine cocktail which does help her symptoms  The patient denies other complaints at this time  Migraine  Location:  Left-Sided  Severity:  Moderate  Onset quality:  Gradual  Duration:  10 days  Timing:  Constant  Progression:  Unchanged  Chronicity:  Recurrent  Ineffective treatments:  OTC medications  Associated symptoms: headaches    Associated symptoms: no abdominal pain, no chest pain, no congestion, no cough, no diarrhea, no fatigue, no fever, no loss of consciousness, no myalgias, no nausea, no rash, no rhinorrhea, no shortness of breath, no sore throat, no vomiting and no wheezing        Prior to Admission Medications   Prescriptions Last Dose Informant Patient Reported? Taking?    B-D 3CC LUER-ARACELIS SYR 25GX1/2" 25G X 1-1/2" 3 ML MISC  Self No No   Sig: USE FOR TORADOL INTRAMUSCULAR INJECTIONS   Patient not taking: Reported on 2022    Erenumab-aooe (AIMOVIG, 140 MG DOSE, SC)  Self Yes No   Sig: Inject 140 mg under the skin every 30 (thirty) days   Patient not taking: Reported on 2022    FLUoxetine (PROzac) 40 MG capsule  Self Yes No   Sig: TAKE 2 CAPSULES BY MOUTH EVERY DAY IN THE MORNING   PROAIR  (90 Base) MCG/ACT inhaler  Self Yes No   Sig: Inhale 2 puffs every 6 (six) hours as needed    Rimegepant Sulfate (Nurtec) 75 MG TBDP  Self No No   Sig: Take 75 mg by mouth as needed (migraine) Limit of 1 in 24 hours   Syringe/Needle, Disp, (SYRINGE 3CC/33OA4-7/4") 27G X 1-4" 3 ML MISC  Self No No   Sig: Use for Toradol intramuscular injections  Patient not taking: Reported on 2022    budesonide-formoterol (SYMBICORT) 160-4 5 mcg/act inhaler  Self Yes No   Sig: Inhale 2 puffs 2 (two) times a day Morning and evening   clonazePAM (KlonoPIN) 0 5 mg tablet  Self Yes No   Sig: Take 0 5 mg by mouth daily     cyproheptadine (PERIACTIN) 4 mg tablet  Self No No   Sig: Take 1 tablet (4 mg total) by mouth daily at bedtime   Patient not taking: Reported on 2022    ketorolac (TORADOL) 30 mg/mL injection  Self No No   Sig: INJECT 1-2 ML 'S IN THE MUSCLE ONCE PER 24 HOURS AS NEEDED FOR MIGRAINE   NO MORE THAN 2 INJECTIONS PER WEEK   lamoTRIgine (LaMICtal) 100 mg tablet  Self No No   Si/2 tab (50 mg) in am and 1 tab (100mg) bedtime   prochlorperazine (COMPAZINE) 10 mg tablet  Self No No   Sig: Take 1 tablet (10 mg total) by mouth every 8 (eight) hours as needed for nausea or vomiting (migraine)   Patient not taking: Reported on 2022       Facility-Administered Medications: None       Past Medical History:   Diagnosis Date    Anxiety     Asthma     Depression     Hypertension     Migraine     Rapid heart rate        Past Surgical History:   Procedure Laterality Date    CHOLECYSTECTOMY      GASTRIC BYPASS 2018    also had repair of perforated bowel afterwards    HERNIA REPAIR      HYSTERECTOMY      LUNG SURGERY      AL REPAIR INCISIONAL HERNIA,REDUCIBLE N/A 2022    Procedure: OPEN INCISIONAL HERNIA REPAIR WITH MESH;  Surgeon: Tere Ruby MD;  Location: [facility];  Service: General       Family History   Problem Relation Age of Onset    Cancer Mother     Diabetes Father     Lung cancer Father      I have reviewed and agree with the history as documented  E-Cigarette/Vaping    E-Cigarette Use Never User      E-Cigarette/Vaping Substances    Nicotine No     THC No     CBD No     Flavoring No     Other No     Unknown No      Social History     Tobacco Use    Smoking status: Former Smoker     Quit date:      Years since quittin 2    Smokeless tobacco: Never Used   Vaping Use    Vaping Use: Never used   Substance Use Topics    Alcohol use: Not Currently    Drug use: Not Currently     Types: Cocaine     Comment: last cocain use        Review of Systems   Constitutional: Negative for appetite change, chills, fatigue and fever  HENT: Negative for congestion, rhinorrhea and sore throat  Eyes: Negative for visual disturbance  Respiratory: Negative for cough, chest tightness, shortness of breath and wheezing  Cardiovascular: Negative for chest pain and palpitations  Gastrointestinal: Negative for abdominal pain, constipation, diarrhea, nausea and vomiting  Genitourinary: Negative for dysuria  Musculoskeletal: Negative for myalgias, neck pain and neck stiffness  Skin: Negative for rash  Neurological: Positive for headaches  Negative for dizziness, seizures, loss of consciousness, syncope, weakness, light-headedness and numbness  Psychiatric/Behavioral: Negative for confusion  Physical Exam  Physical Exam  Vitals and nursing note reviewed  Constitutional:       General: She is not in acute distress  Appearance: Normal appearance  She is normal weight  She is not ill-appearing, toxic-appearing or diaphoretic  HENT:      Head: Normocephalic and atraumatic  Nose: Nose normal  No congestion or rhinorrhea  Mouth/Throat:      Mouth: Mucous membranes are moist       Pharynx: No oropharyngeal exudate or posterior oropharyngeal erythema  Eyes:      General: No scleral icterus  Right eye: No discharge  Left eye: No discharge  Extraocular Movements: Extraocular movements intact  Pupils: Pupils are equal, round, and reactive to light  Cardiovascular:      Rate and Rhythm: Normal rate and regular rhythm  Pulses: Normal pulses  Heart sounds: Normal heart sounds  No murmur heard  No friction rub  No gallop  Pulmonary:      Effort: Pulmonary effort is normal  No respiratory distress  Breath sounds: Normal breath sounds  No stridor  No wheezing, rhonchi or rales  Chest:      Chest wall: No tenderness  Musculoskeletal:         General: Normal range of motion  Cervical back: Normal range of motion  No tenderness  Right lower leg: No edema  Left lower leg: No edema  Skin:     General: Skin is warm and dry  Capillary Refill: Capillary refill takes less than 2 seconds  Coloration: Skin is not jaundiced or pale  Neurological:      General: No focal deficit present  Mental Status: She is alert and oriented to person, place, and time  Mental status is at baseline        Comments: 5/5 strength in bilateral upper and lower extremities  Normal sensation to bilateral upper and lower extremities  The patient is able to smile, frown, puff out cheeks, and raise eyebrows bilaterally symmetrically without difficulty  Normal finger-to-nose examination  No dysdiadochokinesia or cerebellar signs   Psychiatric:         Mood and Affect: Mood normal          Behavior: Behavior normal          Vital Signs  ED Triage Vitals   Temperature Pulse Respirations Blood Pressure SpO2   03/19/22 1505 03/19/22 1505 03/19/22 1505 03/19/22 1505 03/19/22 1505   98 2 °F (36 8 °C) 88 18 134/60 97 %      Temp Source Heart Rate Source Patient Position - Orthostatic VS BP Location FiO2 (%)   03/19/22 1505 03/19/22 1505 03/19/22 1505 03/19/22 1505 --   Oral Monitor Sitting Left arm       Pain Score       03/19/22 1648       7           Vitals:    03/19/22 1505   BP: 134/60   Pulse: 88   Patient Position - Orthostatic VS: Sitting         Visual Acuity      ED Medications  Medications   dexamethasone (PF) (DECADRON) injection 10 mg (10 mg Intravenous Given 3/19/22 1651)   valproate (DEPACON) 1,000 mg in sodium chloride 0 9 % 50 mL for headache (0 g Intravenous Stopped 3/19/22 1804)       Diagnostic Studies  Results Reviewed     None                 No orders to display              Procedures  Procedures         ED Course  ED Course as of 03/20/22 1552   Sat Mar 19, 2022   1621 Patient re-evaluated  No change in migraine symptoms  Will add Decadron and Depacon in attempts to abort migraine  Will continue to monitor  1622 Patient has attempted Compazine at home  "It does not work for me "   1800 Patient notes migraine is moderately better  Feels comfortable to be discharged  Will follow up with neurologist   Darius Martinez  1805 Patient will not be driving home  SBIRT 22yo+      Most Recent Value   SBIRT (22 yo +)    In order to provide better care to our patients, we are screening all of our patients for alcohol and drug use  Would it be okay to ask you these screening questions? Yes Filed at: 03/19/2022 1510   Initial Alcohol Screen: US AUDIT-C     1  How often do you have a drink containing alcohol? 0 Filed at: 03/19/2022 1510   2  How many drinks containing alcohol do you have on a typical day you are drinking? 0 Filed at: 03/19/2022 1510   3a  Male UNDER 65: How often do you have five or more drinks on one occasion? 0 Filed at: 03/19/2022 1510   3b  FEMALE Any Age, or MALE 65+:  How often do you have 4 or more drinks on one occassion? 0 Filed at: 03/19/2022 1510   Audit-C Score 0 Filed at: 03/19/2022 1510   SAMUEL: How many times in the past year have you    Used an illegal drug or used a prescription medication for non-medical reasons? Never Filed at: 03/19/2022 1510                    MDM  Number of Diagnoses or Management Options  Intractable migraine without status migrainosus, unspecified migraine type: new and does not require workup  Diagnosis management comments: This is a 27-year-old female presenting to the emergency department today for a migraine headache on her left side for 10 days  This is typical of her migraine headaches  She takes numerous migraine medications at home that have not been working  She has also been taking over-the-counter medications at of also not been working  No associated neurologic deficits  The patient has a normal cardiac and pulmonary examination on physical exam   She also has a normal neurologic assessment  Overall, she appears comfortable  She is given 15 of Toradol, 10 of Reglan, and 25 of Benadryl in addition to 2 of magnesium and she noted very little relief from this  She was then given 10 of dexamethasone and 1000 of Depacon with moderate relief of headache  She notes that when she came in her headache was a 15/10 in intensity and now it is about a 5/10 in intensity  The patient is stable for discharge at this time  Recommending PCP and neurology follow-up as soon as possible  Strict return precautions given  Recommend the patient continue to take her chronic migraine medication as directed by her neurologist   Patient is aware of return to the emergency department suggestions  She will return to the emergency department if she cannot get her migraine better under control  The patient verifies understanding and agrees to the plan at this time  All questions answered to the patient's satisfaction    The patient then ambulated out of the emergency department with a steady gait with stable vital signs  Amount and/or Complexity of Data Reviewed  Review and summarize past medical records: yes        Disposition  Final diagnoses:   Intractable migraine without status migrainosus, unspecified migraine type     Time reflects when diagnosis was documented in both MDM as applicable and the Disposition within this note     Time User Action Codes Description Comment    3/19/2022  6:01 PM Martha Mak Carpenter Add [G43 [id number]] Intractable migraine without status migrainosus, unspecified migraine type       ED Disposition     ED Disposition Condition Date/Time Comment    Discharge Stable Sat Mar 19, 2022 [address] discharge to home/self care              Follow-up Information     Follow up With Specialties Details Why Contact Info Additional Information    Dallas Sutherland MD Family Medicine Schedule an appointment as soon as possible for a visit   [address] [place] [address] [phone]       [address] Emergency Department Emergency Medicine Go to  If symptoms worsen [address] [facility],[address] [id number]  [address] [facility] Emergency Department, [address], [address] [facility]    Dom Thakkar PA-C Neurology, Physician Assistant Schedule an appointment as soon as possible for a visit   [address] [place] [address] [address]  [phone]             Discharge Medication List as of 3/19/2022  6:03 PM      CONTINUE these medications which have NOT CHANGED    Details   B-D 3CC LUER-ARACELIS SYR 25GX1/2" 25G X 1-1/2" 3 ML MISC USE FOR TORADOL INTRAMUSCULAR INJECTIONS, Normal      budesonide-formoterol (SYMBICORT) 160-4 5 mcg/act inhaler Inhale 2 puffs 2 (two) times a day Morning and evening, Starting Sun 11/7/2021, Historical Med      clonazePAM (KlonoPIN) 0 5 mg tablet Take 0 5 mg by mouth daily  , Historical Med      cyproheptadine (PERIACTIN) 4 mg tablet Take 1 tablet (4 mg total) by mouth daily at bedtime, Starting Wed 9/8/2021, Normal      Erenumab-aooe (AIMOVIG, 140 MG DOSE, SC) Inject 140 mg under the skin every 30 (thirty) days, Historical Med      FLUoxetine (PROzac) 40 MG capsule TAKE 2 CAPSULES BY MOUTH EVERY DAY IN THE MORNING, Historical Med      ketorolac (TORADOL) 30 mg/mL injection INJECT 1-2 ML 'S IN THE MUSCLE ONCE PER 24 HOURS AS NEEDED FOR MIGRAINE  NO MORE THAN 2 INJECTIONS PER WEEK, Normal      lamoTRIgine (LaMICtal) 100 mg tablet 1/2 tab (50 mg) in am and 1 tab (100mg) bedtime, Normal      PROAIR  (90 Base) MCG/ACT inhaler Inhale 2 puffs every 6 (six) hours as needed , Starting Wed 4/4/2018, Historical Med      prochlorperazine (COMPAZINE) 10 mg tablet Take 1 tablet (10 mg total) by mouth every 8 (eight) hours as needed for nausea or vomiting (migraine), Starting Wed 9/8/2021, Normal      Rimegepant Sulfate (Nurtec) 75 MG TBDP Take 75 mg by mouth as needed (migraine) Limit of 1 in 24 hours, Starting Wed 6/23/2021, Normal      Syringe/Needle, Disp, (SYRINGE 3CC/71OH6-6/4") 27G X 1-1/4" 3 ML MISC Use for Toradol intramuscular injections  , Normal             No discharge procedures on file      PDMP Review     None          ED Provider  Electronically Signed by           Scott Deshpande PA-C  03/20/22 7459

## 2022-05-09 ENCOUNTER — APPOINTMENT (OUTPATIENT)
Dept: LAB | Facility: HOSPITAL | Age: 57
End: 2022-05-09
Attending: FAMILY MEDICINE
Payer: COMMERCIAL

## 2022-05-09 DIAGNOSIS — R73.01 IMPAIRED FASTING GLUCOSE: ICD-10-CM

## 2022-05-09 DIAGNOSIS — E61.1 IRON DEFICIENCY: ICD-10-CM

## 2022-05-09 DIAGNOSIS — E78.5 HYPERLIPIDEMIA, UNSPECIFIED HYPERLIPIDEMIA TYPE: ICD-10-CM

## 2022-05-09 DIAGNOSIS — I10 HYPERTENSION, ESSENTIAL: ICD-10-CM

## 2022-05-09 DIAGNOSIS — E03.9 ACQUIRED HYPOTHYROIDISM: ICD-10-CM

## 2022-05-09 LAB
ALBUMIN SERPL BCP-MCNC: 4.1 G/DL (ref 3.5–5)
ALP SERPL-CCNC: 87 U/L (ref 34–104)
ALT SERPL W P-5'-P-CCNC: 14 U/L (ref 7–52)
ANION GAP SERPL CALCULATED.3IONS-SCNC: 7 MMOL/L (ref 4–13)
AST SERPL W P-5'-P-CCNC: 14 U/L (ref 13–39)
BASOPHILS # BLD AUTO: 0.08 THOUSANDS/ΜL (ref 0–0.1)
BASOPHILS NFR BLD AUTO: 1 % (ref 0–1)
BILIRUB SERPL-MCNC: 0.37 MG/DL (ref 0.2–1)
BUN SERPL-MCNC: 10 MG/DL (ref 5–25)
CALCIUM SERPL-MCNC: 9.1 MG/DL (ref 8.4–10.2)
CHLORIDE SERPL-SCNC: 105 MMOL/L (ref 96–108)
CHOLEST SERPL-MCNC: 209 MG/DL
CO2 SERPL-SCNC: 26 MMOL/L (ref 21–32)
CREAT SERPL-MCNC: 1 MG/DL (ref 0.6–1.3)
EOSINOPHIL # BLD AUTO: 0.32 THOUSAND/ΜL (ref 0–0.61)
EOSINOPHIL NFR BLD AUTO: 5 % (ref 0–6)
ERYTHROCYTE [DISTWIDTH] IN BLOOD BY AUTOMATED COUNT: 16.3 % (ref 11.6–15.1)
EST. AVERAGE GLUCOSE BLD GHB EST-MCNC: 105 MG/DL
FERRITIN SERPL-MCNC: 11 NG/ML (ref 8–388)
GFR SERPL CREATININE-BSD FRML MDRD: 63 ML/MIN/1.73SQ M
GLUCOSE P FAST SERPL-MCNC: 91 MG/DL (ref 65–99)
HBA1C MFR BLD: 5.3 %
HCT VFR BLD AUTO: 44 % (ref 34.8–46.1)
HDLC SERPL-MCNC: 67 MG/DL
HGB BLD-MCNC: 13.2 G/DL (ref 11.5–15.4)
IMM GRANULOCYTES # BLD AUTO: 0.02 THOUSAND/UL (ref 0–0.2)
IMM GRANULOCYTES NFR BLD AUTO: 0 % (ref 0–2)
IRON SATN MFR SERPL: 9 % (ref 15–50)
IRON SERPL-MCNC: 40 UG/DL (ref 50–170)
LDLC SERPL CALC-MCNC: 125 MG/DL (ref 0–100)
LYMPHOCYTES # BLD AUTO: 2.01 THOUSANDS/ΜL (ref 0.6–4.47)
LYMPHOCYTES NFR BLD AUTO: 31 % (ref 14–44)
MCH RBC QN AUTO: 24.1 PG (ref 26.8–34.3)
MCHC RBC AUTO-ENTMCNC: 30 G/DL (ref 31.4–37.4)
MCV RBC AUTO: 80 FL (ref 82–98)
MONOCYTES # BLD AUTO: 0.47 THOUSAND/ΜL (ref 0.17–1.22)
MONOCYTES NFR BLD AUTO: 7 % (ref 4–12)
NEUTROPHILS # BLD AUTO: 3.64 THOUSANDS/ΜL (ref 1.85–7.62)
NEUTS SEG NFR BLD AUTO: 56 % (ref 43–75)
NONHDLC SERPL-MCNC: 142 MG/DL
NRBC BLD AUTO-RTO: 0 /100 WBCS
PLATELET # BLD AUTO: 384 THOUSANDS/UL (ref 149–390)
PMV BLD AUTO: 9.7 FL (ref 8.9–12.7)
POTASSIUM SERPL-SCNC: 4.4 MMOL/L (ref 3.5–5.3)
PROT SERPL-MCNC: 7 G/DL (ref 6.4–8.4)
RBC # BLD AUTO: 5.48 MILLION/UL (ref 3.81–5.12)
SODIUM SERPL-SCNC: 138 MMOL/L (ref 135–147)
T4 FREE SERPL-MCNC: 0.94 NG/DL (ref 0.76–1.46)
TIBC SERPL-MCNC: 438 UG/DL (ref 250–450)
TRIGL SERPL-MCNC: 84 MG/DL
TSH SERPL DL<=0.05 MIU/L-ACNC: 3.36 UIU/ML (ref 0.45–4.5)
WBC # BLD AUTO: 6.54 THOUSAND/UL (ref 4.31–10.16)

## 2022-05-09 PROCEDURE — 84439 ASSAY OF FREE THYROXINE: CPT

## 2022-05-09 PROCEDURE — 83540 ASSAY OF IRON: CPT

## 2022-05-09 PROCEDURE — 82728 ASSAY OF FERRITIN: CPT

## 2022-05-09 PROCEDURE — 85025 COMPLETE CBC W/AUTO DIFF WBC: CPT

## 2022-05-09 PROCEDURE — 83036 HEMOGLOBIN GLYCOSYLATED A1C: CPT

## 2022-05-09 PROCEDURE — 80053 COMPREHEN METABOLIC PANEL: CPT

## 2022-05-09 PROCEDURE — 84443 ASSAY THYROID STIM HORMONE: CPT

## 2022-05-09 PROCEDURE — 80061 LIPID PANEL: CPT

## 2022-05-09 PROCEDURE — 36415 COLL VENOUS BLD VENIPUNCTURE: CPT

## 2022-05-09 PROCEDURE — 83550 IRON BINDING TEST: CPT

## 2022-05-23 ENCOUNTER — TELEPHONE (OUTPATIENT)
Dept: PALLIATIVE MEDICINE | Facility: CLINIC | Age: 57
End: 2022-05-23

## 2022-06-14 ENCOUNTER — HOSPITAL ENCOUNTER (OUTPATIENT)
Dept: RADIOLOGY | Age: 57
Discharge: HOME | End: 2022-06-14
Payer: COMMERCIAL

## 2022-06-14 VITALS — WEIGHT: 201 LBS | HEIGHT: 62 IN | BODY MASS INDEX: 36.99 KG/M2

## 2022-06-14 DIAGNOSIS — Z12.31 ENCOUNTER FOR SCREENING MAMMOGRAM FOR MALIGNANT NEOPLASM OF BREAST: ICD-10-CM

## 2022-06-14 PROCEDURE — 77063 BREAST TOMOSYNTHESIS BI: CPT

## 2022-06-14 PROCEDURE — 77067 SCR MAMMO BI INCL CAD: CPT

## 2022-08-01 ENCOUNTER — OFFICE VISIT (OUTPATIENT)
Dept: GASTROENTEROLOGY | Facility: AMBULARY SURGERY CENTER | Age: 57
End: 2022-08-01
Payer: COMMERCIAL

## 2022-08-01 ENCOUNTER — TELEPHONE (OUTPATIENT)
Dept: GASTROENTEROLOGY | Facility: AMBULARY SURGERY CENTER | Age: 57
End: 2022-08-01

## 2022-08-01 VITALS
HEIGHT: 62 IN | BODY MASS INDEX: 35.7 KG/M2 | HEART RATE: 104 BPM | DIASTOLIC BLOOD PRESSURE: 80 MMHG | SYSTOLIC BLOOD PRESSURE: 142 MMHG | OXYGEN SATURATION: 100 % | WEIGHT: 194 LBS

## 2022-08-01 DIAGNOSIS — R19.4 CHANGE IN BOWEL HABITS: ICD-10-CM

## 2022-08-01 DIAGNOSIS — R10.9 ABDOMINAL PAIN, UNSPECIFIED ABDOMINAL LOCATION: Primary | ICD-10-CM

## 2022-08-01 DIAGNOSIS — Z86.010 HISTORY OF COLON POLYPS: ICD-10-CM

## 2022-08-01 PROBLEM — Z86.0100 HISTORY OF COLON POLYPS: Status: ACTIVE | Noted: 2022-08-01

## 2022-08-01 PROCEDURE — 99204 OFFICE O/P NEW MOD 45 MIN: CPT | Performed by: INTERNAL MEDICINE

## 2022-08-01 RX ORDER — FERROUS SULFATE 325(65) MG
1 TABLET ORAL DAILY
COMMUNITY
Start: 2022-05-10 | End: 2022-09-05

## 2022-08-01 RX ORDER — PANTOPRAZOLE SODIUM 40 MG/1
40 TABLET, DELAYED RELEASE ORAL DAILY
Qty: 30 TABLET | Refills: 11 | Status: SHIPPED | OUTPATIENT
Start: 2022-08-01 | End: 2022-08-01

## 2022-08-01 NOTE — TELEPHONE ENCOUNTER
Patient is scheduled for colonoscopy and EGD on October 20, 2022 at [facility]  with Jori Agudelo MD  Patient is aware of pre-procedure prep of Golytley/Dulcolax and they will be called the day prior between 2 and 6 pm for time to report for procedure  Pre-procedure prep has been given to the patient  in person  on August 1 , 2022

## 2022-08-01 NOTE — PATIENT INSTRUCTIONS
Scheduled date of EGD/colonoscopy (as of today): 10/20/2022  Physician performing EGD/colonoscopy: Dr Clare Lemus  Location of EGD/colonoscopy:  [facility]  Desired bowel prep reviewed with patient: Denny Gonzalez  Instructions reviewed with patient by: Genesis YOO  Clearances:   None

## 2022-08-01 NOTE — PROGRESS NOTES
Consultation - 126 [facility]  Kristal Cabral 1965 female         Chief Complaint:  Abdominal pain     HPI:  36-year-old female with history of chronic migraines, asthma, depression, gastric bypass surgery 4 years ago reports having abdominal pain for about 4-5 months  Describes as throbbing pain in the mid to lower abdomen lasting for many hours  Reports having nausea which could be due to migraines also  She takes Advil and Tylenol as needed for the pain  Denies any heartburn acid reflux  Denies any difficulty swallowing  Good appetite but losing weight with dietary changes  Reports having episodes of constipation lasting for few days and then she will have loose bowel movements  She underwent incisional hernia repair with mesh in January of this year  She gives history of colon polyps and had colonoscopy many years ago  Chaperon: Ms Dobbins [place]: Review of Systems   Constitutional: Negative for activity change, appetite change, chills, diaphoresis, fatigue, fever and unexpected weight change  HENT: Negative for ear discharge, ear pain, facial swelling, hearing loss, nosebleeds, sore throat, tinnitus and voice change  Eyes: Negative for pain, discharge, redness, itching and visual disturbance  Respiratory: Positive for shortness of breath  Negative for apnea, cough, chest tightness and wheezing  Cardiovascular: Negative for chest pain and palpitations  Gastrointestinal:        As noted in HPI   Endocrine: Negative for cold intolerance, heat intolerance and polyuria  Genitourinary: Negative for difficulty urinating, dysuria, flank pain, hematuria and urgency  Musculoskeletal: Negative for arthralgias, back pain, gait problem, joint swelling and myalgias  Skin: Negative for rash and wound  Neurological: Negative for dizziness, tremors, seizures, speech difficulty, light-headedness, numbness and headaches     Hematological: Negative for adenopathy  Does not bruise/bleed easily  Psychiatric/Behavioral: Negative for agitation, behavioral problems and confusion  The patient is not nervous/anxious           Past Medical History:   Diagnosis Date    Anxiety     Asthma     Depression     Hypertension     Migraine     Rapid heart rate       Past Surgical History:   Procedure Laterality Date    BARIATRIC SURGERY      CHOLECYSTECTOMY      GASTRIC BYPASS  2018    also had repair of perforated bowel afterwards    HERNIA REPAIR      HYSTERECTOMY      LUNG SURGERY      NE REPAIR INCISIONAL HERNIA,REDUCIBLE N/A 2022    Procedure: OPEN INCISIONAL HERNIA REPAIR WITH MESH;  Surgeon: Bree Craig MD;  Location[facility];  Service: General    UPPER GASTROINTESTINAL ENDOSCOPY       Social History     Socioeconomic History    Marital status: /Civil Union     Spouse name: Not on file    Number of children: Not on file    Years of education: Not on file    Highest education level: Not on file   Occupational History    Not on file   Tobacco Use    Smoking status: Former Smoker     Quit date:      Years since quittin 6    Smokeless tobacco: Never Used   Vaping Use    Vaping Use: Never used   Substance and Sexual Activity    Alcohol use: Yes     Comment: Occs    Drug use: Not Currently     Types: Cocaine     Comment: last cocain use     Sexual activity: Not Currently   Other Topics Concern    Not on file   Social History Narrative    Not on file     Social Determinants of Health     Financial Resource Strain: Not on file   Food Insecurity: Not on file   Transportation Needs: Not on file   Physical Activity: Not on file   Stress: Not on file   Social Connections: Not on file   Intimate Partner Violence: Not on file   Housing Stability: Not on file     Family History   Problem Relation Age of Onset    Cancer Mother         liver    Diabetes Father     Lung cancer Father     No Known Problems Sister     No Known Problems Sister     No Known Problems Sister     No Known Problems Daughter     No Known Problems Maternal Grandmother     No Known Problems Maternal Grandfather     No Known Problems Paternal Grandmother     No Known Problems Paternal Grandfather     No Known Problems Son     No Known Problems Maternal Aunt     No Known Problems Maternal Aunt     No Known Problems Paternal Aunt      Latex, Other, and Penicillins  Current Outpatient Medications   Medication Sig Dispense Refill    bisacodyl (DULCOLAX) 5 mg EC tablet Take 2 tablets (10 mg total) by mouth once for 1 dose 2 tablet 0    budesonide-formoterol (SYMBICORT) 160-4 5 mcg/act inhaler Inhale 2 puffs 2 (two) times a day Morning and evening      clonazePAM (KlonoPIN) 0 5 mg tablet Take 0 5 mg by mouth daily      FLUoxetine (PROzac) 40 MG capsule TAKE 2 CAPSULES BY MOUTH EVERY DAY IN THE MORNING      ketorolac (TORADOL) 30 mg/mL injection INJECT 1-2 ML 'S IN THE MUSCLE ONCE PER 24 HOURS AS NEEDED FOR MIGRAINE   NO MORE THAN 2 INJECTIONS PER WEEK 6 mL 0    lamoTRIgine (LaMICtal) 100 mg tablet 1/2 tab (50 mg) in am and 1 tab (100mg) bedtime 45 tablet 11    pantoprazole (PROTONIX) 40 mg tablet Take 1 tablet (40 mg total) by mouth daily 30 tablet 11    polyethylene glycol (GOLYTELY) 4000 mL solution Take 4,000 mL by mouth once for 1 dose 4000 mL 0    prochlorperazine (COMPAZINE) 10 mg tablet Take 1 tablet (10 mg total) by mouth every 8 (eight) hours as needed for nausea or vomiting (migraine) 20 tablet 3    B-D 3CC LUER-ARACELIS SYR 25GX1/2" 25G X 1-1/2" 3 ML MISC USE FOR TORADOL INTRAMUSCULAR INJECTIONS (Patient not taking: No sig reported) 6 each 2    cyproheptadine (PERIACTIN) 4 mg tablet Take 1 tablet (4 mg total) by mouth daily at bedtime (Patient not taking: No sig reported) 30 tablet 6    Erenumab-aooe (AIMOVIG, 140 MG DOSE, SC) Inject 140 mg under the skin every 30 (thirty) days (Patient not taking: No sig reported)      FeroSul 325 (65 Fe) MG tablet Take 1 tablet by mouth daily (Patient not taking: Reported on 8/1/2022)      PROAIR  (90 Base) MCG/ACT inhaler Inhale 2 puffs every 6 (six) hours as needed  (Patient not taking: Reported on 8/1/2022)  1    Rimegepant Sulfate (Nurtec) 75 MG TBDP Take 75 mg by mouth as needed (migraine) Limit of 1 in 24 hours (Patient not taking: Reported on 8/1/2022) 8 tablet 3    Syringe/Needle, Disp, (SYRINGE 3CC/45LN5-5/4") 27G X 1-1/4" 3 ML MISC Use for Toradol intramuscular injections  (Patient not taking: No sig reported) 3 each 2     No current facility-administered medications for this visit  Blood pressure 142/80, pulse 104, height 5' 2" (1 575 m), weight 88 kg (194 lb), SpO2 100 %  PHYSICAL EXAM: Physical Exam  Constitutional:       Appearance: Normal appearance  She is well-developed  HENT:      Head: Normocephalic and atraumatic  Nose: Nose normal    Eyes:      Conjunctiva/sclera: Conjunctivae normal    Neck:      Thyroid: No thyromegaly  Vascular: No JVD  Trachea: No tracheal deviation  Cardiovascular:      Rate and Rhythm: Normal rate and regular rhythm  Heart sounds: Normal heart sounds  No murmur heard  No friction rub  No gallop  Pulmonary:      Effort: Pulmonary effort is normal  No respiratory distress  Breath sounds: Normal breath sounds  No wheezing or rales  Abdominal:      General: A surgical scar is present  Bowel sounds are normal  There is no distension  Palpations: Abdomen is soft  There is no mass  Tenderness: There is no abdominal tenderness  There is no guarding  Hernia: No hernia is present  Musculoskeletal:         General: No tenderness or deformity  Cervical back: Neck supple  Right lower leg: No edema  Left lower leg: No edema  Lymphadenopathy:      Cervical: No cervical adenopathy  Skin:     General: Skin is warm and dry  Findings: No erythema or rash     Neurological:      Mental Status: She is alert and oriented to person, place, and time  Psychiatric:         Mood and Affect: Mood normal          Behavior: Behavior normal          Thought Content: Thought content normal           Lab Results   Component Value Date    WBC 6 54 05/09/2022    HGB 13 2 05/09/2022    HCT 44 0 05/09/2022    MCV 80 (L) 05/09/2022     05/09/2022     Lab Results   Component Value Date    GLUCOSE 92 07/02/2015    CALCIUM 9 1 05/09/2022     07/02/2015    K 4 4 05/09/2022    CO2 26 05/09/2022     05/09/2022    BUN 10 05/09/2022    CREATININE 1 00 05/09/2022     Lab Results   Component Value Date    ALT 14 05/09/2022    AST 14 05/09/2022    ALKPHOS 87 05/09/2022    BILITOT 0 3 07/02/2015     Lab Results   Component Value Date    INR 1 02 07/02/2015    PROTIME 12 8 07/02/2015       Mammo screening bilateral w 3d & cad    Result Date: 6/16/2022  Impression: No mammographic evidence of malignancy  ASSESSMENT/BI-RADS CATEGORY: Left: 1 - Negative Right: 1 - Negative Overall: 1 - Negative RECOMMENDATION:      - Routine screening mammogram in 1 year for both breasts  Workstation ID: [id number]       ASSESSMENT & PLAN:    Abdominal pain  Nonspecific abdominal pain, possible adhesions  Rule out anastomotic ulcer  Also consider functional symptoms secondary to IBS  -schedule for upper endoscopy    -avoid NSAIDs    -Patient was explained about the lifestyle and dietary modifications  Advised to avoid fatty foods, chocolates, caffeine, alcohol and any other triggering foods  Change in bowel habits  Likely functional symptoms from IBS  -advised her to take Metamucil regularly and also use MiraLax as needed    -Schedule for colonoscopy  -High-fiber diet     -Patient was given instructions about the colonoscopy prep     -Patient was explained about  the risks and benefits of the procedure  Risks including but not limited to bleeding, infection, perforation were explained in detail   Also explained about less than 100% sensitivity with the exam and other alternatives  History of colon polyps  Personal history of colon polyps- patient is at increased risk for colon cancer screening    Rule out colorectal lesions including polyps or malignancy     -colonoscopy

## 2022-08-01 NOTE — ASSESSMENT & PLAN NOTE
Likely functional symptoms from IBS  -advised her to take Metamucil regularly and also use MiraLax as needed    -Schedule for colonoscopy  -High-fiber diet     -Patient was given instructions about the colonoscopy prep     -Patient was explained about  the risks and benefits of the procedure  Risks including but not limited to bleeding, infection, perforation were explained in detail  Also explained about less than 100% sensitivity with the exam and other alternatives

## 2022-08-01 NOTE — ASSESSMENT & PLAN NOTE
Nonspecific abdominal pain, possible adhesions  Rule out anastomotic ulcer  Also consider functional symptoms secondary to IBS  -schedule for upper endoscopy    -avoid NSAIDs    -Patient was explained about the lifestyle and dietary modifications  Advised to avoid fatty foods, chocolates, caffeine, alcohol and any other triggering foods

## 2022-08-16 ENCOUNTER — HOSPITAL ENCOUNTER (EMERGENCY)
Facility: HOSPITAL | Age: 57
Discharge: HOME | End: 2022-08-16
Attending: EMERGENCY MEDICINE
Payer: COMMERCIAL

## 2022-08-16 ENCOUNTER — APPOINTMENT (EMERGENCY)
Dept: CT IMAGING | Facility: HOSPITAL | Age: 57
End: 2022-08-16
Payer: COMMERCIAL

## 2022-08-16 VITALS
WEIGHT: 185 LBS | SYSTOLIC BLOOD PRESSURE: 117 MMHG | RESPIRATION RATE: 18 BRPM | OXYGEN SATURATION: 100 % | HEART RATE: 85 BPM | TEMPERATURE: 98.2 F | BODY MASS INDEX: 34.04 KG/M2 | HEIGHT: 62 IN | DIASTOLIC BLOOD PRESSURE: 76 MMHG

## 2022-08-16 DIAGNOSIS — R10.84 GENERALIZED ABDOMINAL PAIN: Primary | ICD-10-CM

## 2022-08-16 LAB
ALBUMIN SERPL BCP-MCNC: 3.8 G/DL (ref 3.5–5)
ALP SERPL-CCNC: 84 U/L (ref 34–104)
ALT SERPL W P-5'-P-CCNC: 19 U/L (ref 7–52)
ANION GAP SERPL CALCULATED.3IONS-SCNC: 10 MMOL/L (ref 4–13)
AST SERPL W P-5'-P-CCNC: 16 U/L (ref 13–39)
BASOPHILS # BLD AUTO: 0.05 THOUSANDS/ΜL (ref 0–0.1)
BASOPHILS NFR BLD AUTO: 1 % (ref 0–1)
BILIRUB SERPL-MCNC: 0.34 MG/DL (ref 0.2–1)
BILIRUB UR QL STRIP: NEGATIVE
BUN SERPL-MCNC: 6 MG/DL (ref 5–25)
CALCIUM SERPL-MCNC: 8.6 MG/DL (ref 8.4–10.2)
CARDIAC TROPONIN I PNL SERPL HS: <2 NG/L
CHLORIDE SERPL-SCNC: 105 MMOL/L (ref 96–108)
CLARITY UR: CLEAR
CO2 SERPL-SCNC: 22 MMOL/L (ref 21–32)
COLOR UR: ABNORMAL
CREAT SERPL-MCNC: 0.91 MG/DL (ref 0.6–1.3)
EOSINOPHIL # BLD AUTO: 0.3 THOUSAND/ΜL (ref 0–0.61)
EOSINOPHIL NFR BLD AUTO: 5 % (ref 0–6)
ERYTHROCYTE [DISTWIDTH] IN BLOOD BY AUTOMATED COUNT: 15.7 % (ref 11.6–15.1)
GFR SERPL CREATININE-BSD FRML MDRD: 71 ML/MIN/1.73SQ M
GLUCOSE SERPL-MCNC: 73 MG/DL (ref 65–140)
GLUCOSE UR STRIP-MCNC: NEGATIVE MG/DL
HCT VFR BLD AUTO: 40.5 % (ref 34.8–46.1)
HGB BLD-MCNC: 12.5 G/DL (ref 11.5–15.4)
HGB UR QL STRIP.AUTO: NEGATIVE
IMM GRANULOCYTES # BLD AUTO: 0.02 THOUSAND/UL (ref 0–0.2)
IMM GRANULOCYTES NFR BLD AUTO: 0 % (ref 0–2)
KETONES UR STRIP-MCNC: NEGATIVE MG/DL
LEUKOCYTE ESTERASE UR QL STRIP: NEGATIVE
LIPASE SERPL-CCNC: 27 U/L (ref 11–82)
LYMPHOCYTES # BLD AUTO: 1.53 THOUSANDS/ΜL (ref 0.6–4.47)
LYMPHOCYTES NFR BLD AUTO: 24 % (ref 14–44)
MCH RBC QN AUTO: 23.9 PG (ref 26.8–34.3)
MCHC RBC AUTO-ENTMCNC: 30.9 G/DL (ref 31.4–37.4)
MCV RBC AUTO: 77 FL (ref 82–98)
MONOCYTES # BLD AUTO: 0.49 THOUSAND/ΜL (ref 0.17–1.22)
MONOCYTES NFR BLD AUTO: 8 % (ref 4–12)
NEUTROPHILS # BLD AUTO: 4.09 THOUSANDS/ΜL (ref 1.85–7.62)
NEUTS SEG NFR BLD AUTO: 62 % (ref 43–75)
NITRITE UR QL STRIP: NEGATIVE
NRBC BLD AUTO-RTO: 0 /100 WBCS
PH UR STRIP.AUTO: 6 [PH]
PLATELET # BLD AUTO: 351 THOUSANDS/UL (ref 149–390)
PMV BLD AUTO: 9.7 FL (ref 8.9–12.7)
POTASSIUM SERPL-SCNC: 4.1 MMOL/L (ref 3.5–5.3)
PROT SERPL-MCNC: 6.4 G/DL (ref 6.4–8.4)
PROT UR STRIP-MCNC: NEGATIVE MG/DL
RBC # BLD AUTO: 5.24 MILLION/UL (ref 3.81–5.12)
SODIUM SERPL-SCNC: 137 MMOL/L (ref 135–147)
SP GR UR STRIP.AUTO: <=1.005 (ref 1–1.03)
UROBILINOGEN UR QL STRIP.AUTO: 0.2 E.U./DL
WBC # BLD AUTO: 6.48 THOUSAND/UL (ref 4.31–10.16)

## 2022-08-16 PROCEDURE — G1004 CDSM NDSC: HCPCS

## 2022-08-16 PROCEDURE — 99285 EMERGENCY DEPT VISIT HI MDM: CPT | Performed by: EMERGENCY MEDICINE

## 2022-08-16 PROCEDURE — 99284 EMERGENCY DEPT VISIT MOD MDM: CPT

## 2022-08-16 PROCEDURE — 80053 COMPREHEN METABOLIC PANEL: CPT | Performed by: EMERGENCY MEDICINE

## 2022-08-16 PROCEDURE — 84484 ASSAY OF TROPONIN QUANT: CPT | Performed by: EMERGENCY MEDICINE

## 2022-08-16 PROCEDURE — 85025 COMPLETE CBC W/AUTO DIFF WBC: CPT | Performed by: EMERGENCY MEDICINE

## 2022-08-16 PROCEDURE — 83690 ASSAY OF LIPASE: CPT | Performed by: EMERGENCY MEDICINE

## 2022-08-16 PROCEDURE — 93005 ELECTROCARDIOGRAM TRACING: CPT

## 2022-08-16 PROCEDURE — 81003 URINALYSIS AUTO W/O SCOPE: CPT | Performed by: EMERGENCY MEDICINE

## 2022-08-16 PROCEDURE — 96375 TX/PRO/DX INJ NEW DRUG ADDON: CPT

## 2022-08-16 PROCEDURE — 36415 COLL VENOUS BLD VENIPUNCTURE: CPT | Performed by: EMERGENCY MEDICINE

## 2022-08-16 PROCEDURE — 74177 CT ABD & PELVIS W/CONTRAST: CPT

## 2022-08-16 PROCEDURE — 96374 THER/PROPH/DIAG INJ IV PUSH: CPT

## 2022-08-16 RX ORDER — SUCRALFATE 1 G/1
1 TABLET ORAL 4 TIMES DAILY
Qty: 28 TABLET | Refills: 0 | Status: SHIPPED | OUTPATIENT
Start: 2022-08-16 | End: 2022-09-05

## 2022-08-16 RX ORDER — FAMOTIDINE 10 MG/ML
20 INJECTION, SOLUTION INTRAVENOUS ONCE
Status: COMPLETED | OUTPATIENT
Start: 2022-08-16 | End: 2022-08-16

## 2022-08-16 RX ORDER — SUCRALFATE 1 G/1
1 TABLET ORAL ONCE
Status: COMPLETED | OUTPATIENT
Start: 2022-08-16 | End: 2022-08-16

## 2022-08-16 RX ORDER — LIDOCAINE HYDROCHLORIDE 20 MG/ML
15 SOLUTION OROPHARYNGEAL ONCE
Status: COMPLETED | OUTPATIENT
Start: 2022-08-16 | End: 2022-08-16

## 2022-08-16 RX ORDER — MAGNESIUM HYDROXIDE/ALUMINUM HYDROXICE/SIMETHICONE 120; 1200; 1200 MG/30ML; MG/30ML; MG/30ML
30 SUSPENSION ORAL ONCE
Status: COMPLETED | OUTPATIENT
Start: 2022-08-16 | End: 2022-08-16

## 2022-08-16 RX ORDER — ONDANSETRON 2 MG/ML
4 INJECTION INTRAMUSCULAR; INTRAVENOUS ONCE
Status: COMPLETED | OUTPATIENT
Start: 2022-08-16 | End: 2022-08-16

## 2022-08-16 RX ADMIN — LIDOCAINE HYDROCHLORIDE 15 ML: 20 SOLUTION ORAL; TOPICAL at 14:22

## 2022-08-16 RX ADMIN — ALUMINUM HYDROXIDE, MAGNESIUM HYDROXIDE, AND SIMETHICONE 30 ML: 200; 200; 20 SUSPENSION ORAL at 14:22

## 2022-08-16 RX ADMIN — SUCRALFATE 1 G: 1 TABLET ORAL at 14:22

## 2022-08-16 RX ADMIN — IOHEXOL 50 ML: 240 INJECTION, SOLUTION INTRATHECAL; INTRAVASCULAR; INTRAVENOUS; ORAL at 14:17

## 2022-08-16 RX ADMIN — IOHEXOL 65 ML: 350 INJECTION, SOLUTION INTRAVENOUS at 14:18

## 2022-08-16 RX ADMIN — ONDANSETRON 4 MG: 2 INJECTION INTRAMUSCULAR; INTRAVENOUS at 12:12

## 2022-08-16 RX ADMIN — FAMOTIDINE 20 MG: 10 INJECTION, SOLUTION INTRAVENOUS at 14:22

## 2022-08-16 NOTE — DISCHARGE INSTRUCTIONS
Follow up with your primary care physician and your gastroenterologist  Take the prescribed medications as directed  Please return to the emergency department if you develop worsening symptoms, uncontrolled vomiting, severe pain, vomiting blood, or anything else concerning to you

## 2022-08-16 NOTE — ED PROVIDER NOTES
History  Chief Complaint   Patient presents with    Abdominal Pain     Patient reports abdominal pain x4 weeks, worse with eating and drinking, denies vomiting but has diarrhea  Reports seeing gastro and having a scope scheduled for October  60-year-old female with history of hypertension, asthma, migraines who presents for evaluation of abdominal pain  Patient reports that her pain has been ongoing for 4 weeks  It is constant and daily  She describes it as a burning sensation that starts at her epigastric region and radiates down the center of her abdomen  It does not radiate elsewhere  She has had nausea with occasional episodes of nonbloody nonbilious vomiting, however, this is not uncommon for her with her chronic migraines  She also notes loose stools which are baseline for her after gastric sleeve and bypass surgery  Her stools have been nonbloody and are otherwise normal for her  She has not had any fevers, urinary symptoms  She notes that her pain is worse after any oral intake  She denies fevers, chest pain, shortness of breath  She did see gastroenterology regarding her symptoms and is scheduled for an EGD on 10/20/2022  Prior to Admission Medications   Prescriptions Last Dose Informant Patient Reported? Taking?    B-D 3CC LUER-ARACELIS SYR 25GX1/2" 25G X 1-1/2" 3 ML MISC  Self No No   Sig: USE FOR TORADOL INTRAMUSCULAR INJECTIONS   Patient not taking: No sig reported   Erenumab-aooe (AIMOVIG, 140 MG DOSE, SC) Not Taking at Unknown time  Yes No   Sig: Inject 140 mg under the skin every 30 (thirty) days   Patient not taking: No sig reported   FLUoxetine (PROzac) 40 MG capsule 8/16/2022 at Unknown time Self Yes Yes   Sig: TAKE 2 CAPSULES BY MOUTH EVERY DAY IN THE MORNING   FeroSul 325 (65 Fe) MG tablet Not Taking at Unknown time Self Yes No   Sig: Take 1 tablet by mouth daily   Patient not taking: No sig reported   PROAIR  (90 Base) MCG/ACT inhaler   Yes Yes   Sig: Inhale 2 puffs every 6 (six) hours as needed   Rimegepant Sulfate (Nurtec) 75 MG TBDP   No Yes   Sig: Take 75 mg by mouth as needed (migraine) Limit of 1 in 24 hours   Syringe/Needle, Disp, (SYRINGE 3CC/40KN9-7/4") 27G X 1-4" 3 ML MISC  Self No No   Sig: Use for Toradol intramuscular injections  Patient not taking: No sig reported   bisacodyl (DULCOLAX) 5 mg EC tablet   No No   Sig: Take 2 tablets (10 mg total) by mouth once for 1 dose   budesonide-formoterol (SYMBICORT) 160-4 5 mcg/act inhaler  Self Yes No   Sig: Inhale 2 puffs 2 (two) times a day Morning and evening   clonazePAM (KlonoPIN) 0 5 mg tablet 2022 at Unknown time Self Yes Yes   Sig: Take 0 5 mg by mouth daily   cyproheptadine (PERIACTIN) 4 mg tablet  Self No No   Sig: Take 1 tablet (4 mg total) by mouth daily at bedtime   Patient not taking: No sig reported   ketorolac (TORADOL) 30 mg/mL injection Not Taking at Unknown time Self No No   Sig: INJECT 1-2 ML 'S IN THE MUSCLE ONCE PER 24 HOURS AS NEEDED FOR MIGRAINE   NO MORE THAN 2 INJECTIONS PER WEEK   Patient not taking: Reported on 2022   lamoTRIgine (LaMICtal) 100 mg tablet 2022 at Unknown time Self No Yes   Si/2 tab (50 mg) in am and 1 tab (100mg) bedtime   pantoprazole (PROTONIX) 40 mg tablet Not Taking at Unknown time  No No   Sig: TAKE 1 TABLET(40 MG) BY MOUTH DAILY   Patient not taking: Reported on 2022   polyethylene glycol (GOLYTELY) 4000 mL solution   No Yes   Sig: Take 4,000 mL by mouth once for 1 dose   prochlorperazine (COMPAZINE) 10 mg tablet Not Taking at Unknown time Self No No   Sig: Take 1 tablet (10 mg total) by mouth every 8 (eight) hours as needed for nausea or vomiting (migraine)   Patient not taking: Reported on 2022      Facility-Administered Medications: None       Past Medical History:   Diagnosis Date    Anxiety     Asthma     Depression     Hypertension     Migraine     Rapid heart rate        Past Surgical History:   Procedure Laterality Date    BARIATRIC SURGERY      CHOLECYSTECTOMY      GASTRIC BYPASS  2018    also had repair of perforated bowel afterwards    HERNIA REPAIR      HYSTERECTOMY      LUNG SURGERY      MS REPAIR INCISIONAL HERNIA,REDUCIBLE N/A 2022    Procedure: OPEN INCISIONAL HERNIA REPAIR WITH MESH;  Surgeon: Antony Waters MD;  Location: [facility];  Service: General    UPPER GASTROINTESTINAL ENDOSCOPY         Family History   Problem Relation Age of Onset    Cancer Mother         liver    Diabetes Father     Lung cancer Father     No Known Problems Sister     No Known Problems Sister     No Known Problems Sister     No Known Problems Daughter     No Known Problems Maternal Grandmother     No Known Problems Maternal Grandfather     No Known Problems Paternal Grandmother     No Known Problems Paternal Grandfather     No Known Problems Son     No Known Problems Maternal Aunt     No Known Problems Maternal Aunt     No Known Problems Paternal Aunt      I have reviewed and agree with the history as documented  E-Cigarette/Vaping    E-Cigarette Use Never User      E-Cigarette/Vaping Substances    Nicotine No     THC No     CBD No     Flavoring No     Other No     Unknown No      Social History     Tobacco Use    Smoking status: Former Smoker     Quit date:      Years since quittin 6    Smokeless tobacco: Never Used   Vaping Use    Vaping Use: Never used   Substance Use Topics    Alcohol use: Not Currently     Comment: Occs    Drug use: Not Currently     Types: Cocaine     Comment: last cocain use        Review of Systems   Constitutional: Negative for chills and fever  Respiratory: Negative for cough, chest tightness and shortness of breath  Cardiovascular: Negative for chest pain and leg swelling  Gastrointestinal: Positive for abdominal pain, diarrhea, nausea and vomiting  Negative for abdominal distention and constipation     Genitourinary: Negative for dysuria, flank pain, frequency and urgency  Musculoskeletal: Negative for back pain and gait problem  Skin: Negative for pallor and rash  Neurological: Negative for syncope, weakness, light-headedness and headaches  All other systems reviewed and are negative  Physical Exam  Physical Exam  Vitals and nursing note reviewed  Constitutional:       General: She is not in acute distress  Appearance: She is well-developed  She is not ill-appearing  HENT:      Head: Normocephalic and atraumatic  Nose: Nose normal       Mouth/Throat:      Mouth: Mucous membranes are moist       Pharynx: No oropharyngeal exudate or posterior oropharyngeal erythema  Eyes:      Extraocular Movements: Extraocular movements intact  Pupils: Pupils are equal, round, and reactive to light  Cardiovascular:      Rate and Rhythm: Normal rate and regular rhythm  Heart sounds: No murmur heard  No friction rub  No gallop  Pulmonary:      Effort: Pulmonary effort is normal       Breath sounds: Normal breath sounds  No wheezing, rhonchi or rales  Abdominal:      General: There is no distension  Palpations: Abdomen is soft  Tenderness: There is generalized abdominal tenderness  There is no guarding or rebound  Musculoskeletal:         General: No swelling or tenderness  Normal range of motion  Cervical back: Normal range of motion and neck supple  Skin:     General: Skin is warm and dry  Coloration: Skin is not pale  Findings: No rash  Neurological:      General: No focal deficit present  Mental Status: She is alert and oriented to person, place, and time     Psychiatric:         Behavior: Behavior normal          Vital Signs  ED Triage Vitals [08/16/22 1145]   Temperature Pulse Respirations Blood Pressure SpO2   98 2 °F (36 8 °C) 101 18 149/95 100 %      Temp Source Heart Rate Source Patient Position - Orthostatic VS BP Location FiO2 (%)   Oral Monitor Sitting Right arm --      Pain Score       8 Vitals:    08/16/22 1145 08/16/22 1355   BP: 149/95 117/76   Pulse: 101 85   Patient Position - Orthostatic VS: Sitting Lying         Visual Acuity      ED Medications  Medications   ondansetron (ZOFRAN) injection 4 mg (4 mg Intravenous Given 8/16/22 1212)   sucralfate (CARAFATE) tablet 1 g (1 g Oral Given 8/16/22 1422)   aluminum-magnesium hydroxide-simethicone (MYLANTA) oral suspension 30 mL (30 mL Oral Given 8/16/22 1422)   Famotidine (PF) (PEPCID) injection 20 mg (20 mg Intravenous Given 8/16/22 1422)   Lidocaine Viscous HCl (XYLOCAINE) 2 % mucosal solution 15 mL (15 mL Swish & Swallow Given 8/16/22 1422)   iohexol (OMNIPAQUE) 350 MG/ML injection (MULTI-DOSE) 65 mL (65 mL Intravenous Given 8/16/22 1418)   iohexol (OMNIPAQUE) 240 MG/ML solution 50 mL (50 mL Oral Given 8/16/22 1417)       Diagnostic Studies  Results Reviewed     Procedure Component Value Units Date/Time    HS Troponin 0hr (reflex protocol) [[id number]]  (Normal) Collected: 08/16/22 1206    Lab Status: Final result Specimen: Blood from Arm, Right Updated: 08/16/22 1235     hs TnI 0hr <2 ng/L     Comprehensive metabolic panel [[id number]] Collected: 08/16/22 1206    Lab Status: Final result Specimen: Blood from Arm, Right Updated: 08/16/22 1227     Sodium 137 mmol/L      Potassium 4 1 mmol/L      Chloride 105 mmol/L      CO2 22 mmol/L      ANION GAP 10 mmol/L      BUN 6 mg/dL      Creatinine 0 91 mg/dL      Glucose 73 mg/dL      Calcium 8 6 mg/dL      AST 16 U/L      ALT 19 U/L      Alkaline Phosphatase 84 U/L      Total Protein 6 4 g/dL      Albumin 3 8 g/dL      Total Bilirubin 0 34 mg/dL      eGFR 71 ml/min/1 73sq m     Narrative:      [place] guidelines for Chronic Kidney Disease (CKD):     Stage 1 with normal or high GFR (GFR > 90 mL/min/1 73 square meters)    Stage 2 Mild CKD (GFR = 60-89 mL/min/1 73 square meters)    Stage 3A Moderate CKD (GFR = 45-59 mL/min/1 73 square meters)    Stage 3B Moderate CKD (GFR = 30-44 mL/min/1 73 square meters)    Stage 4 Severe CKD (GFR = 15-29 mL/min/1 73 square meters)    Stage 5 End Stage CKD (GFR <15 mL/min/1 73 square meters)  Note: GFR calculation is accurate only with a steady state creatinine    Lipase [[id number]]  (Normal) Collected: 08/16/22 1206    Lab Status: Final result Specimen: Blood from Arm, Right Updated: 08/16/22 1227     Lipase 27 u/L     UA w Reflex to Microscopic w Reflex to Culture [[id number]]  (Abnormal) Collected: 08/16/22 1210    Lab Status: Final result Specimen: Urine, Clean Catch Updated: 08/16/22 1220     Color, UA Straw     Clarity, UA Clear     Specific Gravity, UA <=1 005     pH, UA 6 0     Leukocytes, UA Negative     Nitrite, UA Negative     Protein, UA Negative mg/dl      Glucose, UA Negative mg/dl      Ketones, UA Negative mg/dl      Urobilinogen, UA 0 2 E U /dl      Bilirubin, UA Negative     Occult Blood, UA Negative    CBC and differential [[id number]]  (Abnormal) Collected: 08/16/22 1206    Lab Status: Final result Specimen: Blood from Arm, Right Updated: 08/16/22 1210     WBC 6 48 Thousand/uL      RBC 5 24 Million/uL      Hemoglobin 12 5 g/dL      Hematocrit 40 5 %      MCV 77 fL      MCH 23 9 pg      MCHC 30 9 g/dL      RDW 15 7 %      MPV 9 7 fL      Platelets 334 Thousands/uL      nRBC 0 /100 WBCs      Neutrophils Relative 62 %      Immat GRANS % 0 %      Lymphocytes Relative 24 %      Monocytes Relative 8 %      Eosinophils Relative 5 %      Basophils Relative 1 %      Neutrophils Absolute 4 09 Thousands/µL      Immature Grans Absolute 0 02 Thousand/uL      Lymphocytes Absolute 1 53 Thousands/µL      Monocytes Absolute 0 49 Thousand/µL      Eosinophils Absolute 0 30 Thousand/µL      Basophils Absolute 0 05 Thousands/µL                  CT abdomen pelvis with contrast   Final Result by Cathy Holbrook MD (08/16 6395)      No acute findings in the abdomen or pelvis            Workstation performed: [id number] Procedures  Procedures         ED Course  ED Course as of 08/16/22 1651   Tue Aug 16, 2022   1221 UA w Reflex to Microscopic w Reflex to Culture(!)   1221 CBC and differential(!)   1224 Procedure Note: EKG  Date/Time: 08/16/22 11:59 AM   Interpreted by: Vic Sheridan  Indications / Diagnosis: abdominal pain  ECG reviewed by me, the ED Provider: yes   The EKG demonstrates:  Rhythm: rate 91, normal sinus  Intervals: normal intervals  Axis: normal axis  QRS/Blocks: normal QRS  ST Changes: No acute ST Changes, no STD/VICTORINA       1229 Lipase: 27   1229 Comprehensive metabolic panel   0240 hs TnI 0hr: <2                                             MDM  Number of Diagnoses or Management Options  Generalized abdominal pain: established and worsening  Diagnosis management comments: 60-year-old female who presents for evaluation of abdominal pain which has been ongoing for the past 4 weeks  She has already followed with GI and has an endoscopy scheduled on 10/20/2022  On arrival her vital signs are stable and she was overall well-appearing  Will obtain abdominal labs as well as cardiac workup  Will also obtain CT abdomen pelvis with oral and IV contrast given her history of gastric bypass  Will treat symptomatically with GI cocktail  Workup unremarkable with no cause as to patient's symptoms  Advised follow-up with GI  Return precautions discussed         Amount and/or Complexity of Data Reviewed  Clinical lab tests: ordered and reviewed  Tests in the radiology section of CPT®: ordered and reviewed  Review and summarize past medical records: yes    Risk of Complications, Morbidity, and/or Mortality  Presenting problems: high  Diagnostic procedures: low  Management options: moderate    Patient Progress  Patient progress: stable      Disposition  Final diagnoses:   Generalized abdominal pain     Time reflects when diagnosis was documented in both MDM as applicable and the Disposition within this note     Time User Action Codes Description Comment    8/16/2022  3:12 PM Deshaun Sheppard Add [R10 84] Generalized abdominal pain     8/16/2022  3:13 PM Deshaun Sheppard Modify [R10 84] Generalized abdominal pain       ED Disposition     ED Disposition   Discharge    Condition   Stable    Date/Time   Tue Aug 16, 2022  3:12 PM    Comment   Miriam Garcia discharge to home/self care  Follow-up Information     Follow up With Specialties Details Why Contact Info    Juan Bliss MD Family Medicine In 2 days  [address] [place] [address]   [phone]            Discharge Medication List as of 8/16/2022  3:14 PM      START taking these medications    Details   sucralfate (CARAFATE) 1 g tablet Take 1 tablet (1 g total) by mouth 4 (four) times a day for 7 days, Starting Tue 8/16/2022, Until Tue 8/23/2022, Normal         CONTINUE these medications which have NOT CHANGED    Details   B-D 3CC LUER-ARACELIS SYR 25GX1/2" 25G X 1-1/2" 3 ML MISC USE FOR TORADOL INTRAMUSCULAR INJECTIONS, Normal      bisacodyl (DULCOLAX) 5 mg EC tablet Take 2 tablets (10 mg total) by mouth once for 1 dose, Starting Mon 8/1/2022, Normal      budesonide-formoterol (SYMBICORT) 160-4 5 mcg/act inhaler Inhale 2 puffs 2 (two) times a day Morning and evening, Starting Sun 11/7/2021, Historical Med      clonazePAM (KlonoPIN) 0 5 mg tablet Take 0 5 mg by mouth daily, Historical Med      cyproheptadine (PERIACTIN) 4 mg tablet Take 1 tablet (4 mg total) by mouth daily at bedtime, Starting Wed 9/8/2021, Normal      Erenumab-aooe (AIMOVIG, 140 MG DOSE, SC) Inject 140 mg under the skin every 30 (thirty) days, Historical Med      FeroSul 325 (65 Fe) MG tablet Take 1 tablet by mouth daily, Starting Tue 5/10/2022, Historical Med      FLUoxetine (PROzac) 40 MG capsule TAKE 2 CAPSULES BY MOUTH EVERY DAY IN THE MORNING, Historical Med      ketorolac (TORADOL) 30 mg/mL injection INJECT 1-2 ML 'S IN THE MUSCLE ONCE PER 24 HOURS AS NEEDED FOR MIGRAINE   NO MORE THAN 2 INJECTIONS PER WEEK, Normal      lamoTRIgine (LaMICtal) 100 mg tablet 1/2 tab (50 mg) in am and 1 tab (100mg) bedtime, Normal      pantoprazole (PROTONIX) 40 mg tablet TAKE 1 TABLET(40 MG) BY MOUTH DAILY, Normal      polyethylene glycol (GOLYTELY) 4000 mL solution Take 4,000 mL by mouth once for 1 dose, Starting Mon 8/1/2022, Normal      PROAIR  (90 Base) MCG/ACT inhaler Inhale 2 puffs every 6 (six) hours as needed, Starting Wed 4/4/2018, Historical Med      prochlorperazine (COMPAZINE) 10 mg tablet Take 1 tablet (10 mg total) by mouth every 8 (eight) hours as needed for nausea or vomiting (migraine), Starting Wed 9/8/2021, Normal      Rimegepant Sulfate (Nurtec) 75 MG TBDP Take 75 mg by mouth as needed (migraine) Limit of 1 in 24 hours, Starting Wed 6/23/2021, Normal      Syringe/Needle, Disp, (SYRINGE 3CC/30MF8-8/4") 27G X 1-1/4" 3 ML MISC Use for Toradol intramuscular injections  , Normal             No discharge procedures on file      PDMP Review     None          ED Provider  Electronically Signed by           Jaya Thakur MD  08/16/22 0644

## 2022-08-17 LAB
ATRIAL RATE: 91 BPM
P AXIS: 70 DEGREES
PR INTERVAL: 152 MS
QRS AXIS: 36 DEGREES
QRSD INTERVAL: 100 MS
QT INTERVAL: 379 MS
QTC INTERVAL: 467 MS
T WAVE AXIS: 65 DEGREES
VENTRICULAR RATE: 91 BPM

## 2022-08-17 PROCEDURE — 93010 ELECTROCARDIOGRAM REPORT: CPT | Performed by: INTERNAL MEDICINE

## 2022-09-05 ENCOUNTER — APPOINTMENT (OUTPATIENT)
Dept: RADIOLOGY | Facility: HOSPITAL | Age: 57
End: 2022-09-05
Payer: COMMERCIAL

## 2022-09-05 ENCOUNTER — HOSPITAL ENCOUNTER (EMERGENCY)
Facility: HOSPITAL | Age: 57
Discharge: HOME | End: 2022-09-05
Attending: EMERGENCY MEDICINE
Payer: COMMERCIAL

## 2022-09-05 VITALS
OXYGEN SATURATION: 100 % | WEIGHT: 185 LBS | BODY MASS INDEX: 33.84 KG/M2 | RESPIRATION RATE: 18 BRPM | DIASTOLIC BLOOD PRESSURE: 90 MMHG | HEART RATE: 86 BPM | SYSTOLIC BLOOD PRESSURE: 144 MMHG | TEMPERATURE: 98.4 F

## 2022-09-05 DIAGNOSIS — S10.11XA ABRASION OF PHARYNX, INITIAL ENCOUNTER: Primary | ICD-10-CM

## 2022-09-05 PROCEDURE — 99284 EMERGENCY DEPT VISIT MOD MDM: CPT | Performed by: PHYSICIAN ASSISTANT

## 2022-09-05 PROCEDURE — 70360 X-RAY EXAM OF NECK: CPT

## 2022-09-05 PROCEDURE — 99283 EMERGENCY DEPT VISIT LOW MDM: CPT

## 2022-09-05 RX ORDER — LIDOCAINE HYDROCHLORIDE 20 MG/ML
15 SOLUTION OROPHARYNGEAL ONCE
Status: COMPLETED | OUTPATIENT
Start: 2022-09-05 | End: 2022-09-05

## 2022-09-05 RX ORDER — IBUPROFEN 600 MG/1
600 TABLET ORAL ONCE
Status: COMPLETED | OUTPATIENT
Start: 2022-09-05 | End: 2022-09-05

## 2022-09-05 RX ADMIN — LIDOCAINE HYDROCHLORIDE 15 ML: 20 SOLUTION ORAL; TOPICAL at 12:26

## 2022-09-05 RX ADMIN — IBUPROFEN 600 MG: 600 TABLET ORAL at 12:25

## 2022-09-05 NOTE — ED PROVIDER NOTES
History  Chief Complaint   Patient presents with    Mouth Injury     Pt reports she was sleeping and started to swallow her partial plate, she "ripped" it out, and reports blood coming from "everywhere "      Past Medical History: Anxiety, Asthma, Depression, HTN, Migraine  Past Surgical History: BARIATRIC SURGERY, CHOLECYSTECTOMY, GASTRIC BYPASS-also had repair of perforated bowel afterwards, HERNIA REPAIR, HYSTERECTOMY,  LUNG SURGERY,   Pt reports she was sleeping and started to swallow her partial plate, which is small but has sharp, metal edges, she was able to grab it and "ripped" it out, and had blood coming from mouth, which has since resolved  Pt now c/o pain, irina on left sided of throat,  when she drinks anything, has tried hot and cold, but still having pain, so wanted to be checked  NO further bleeding, no sob, no cp, no neck pain  Prior to Admission Medications   Prescriptions Last Dose Informant Patient Reported? Taking? B-D 3CC LUER-ARACELIS SYR 25GX1/2" 25G X 1-1/2" 3 ML MISC   No Yes   Sig: USE FOR TORADOL INTRAMUSCULAR INJECTIONS   FLUoxetine (PROzac) 40 MG capsule  Self Yes Yes   Sig: TAKE 2 CAPSULES BY MOUTH EVERY DAY IN THE MORNING   Rimegepant Sulfate (Nurtec) 75 MG TBDP More than a month at Unknown time  No No   Sig: Take 75 mg by mouth as needed (migraine) Limit of 1 in 24 hours   Syringe/Needle, Disp, (SYRINGE 3CC/02BV2-2/4") 27G X 1-4" 3 ML MISC   No Yes   Sig: Use for Toradol intramuscular injections  budesonide-formoterol (SYMBICORT) 160-4 5 mcg/act inhaler  Self Yes Yes   Sig: Inhale 2 puffs 2 (two) times a day Morning and evening   clonazePAM (KlonoPIN) 0 5 mg tablet  Self Yes Yes   Sig: Take 0 5 mg by mouth daily   ketorolac (TORADOL) 30 mg/mL injection   No Yes   Sig: INJECT 1-2 ML 'S IN THE MUSCLE ONCE PER 24 HOURS AS NEEDED FOR MIGRAINE   NO MORE THAN 2 INJECTIONS PER WEEK   lamoTRIgine (LaMICtal) 100 mg tablet  Self No Yes   Si/2 tab (50 mg) in am and 1 tab (100mg) bedtime      Facility-Administered Medications: None       Past Medical History:   Diagnosis Date    Anxiety     Asthma     Depression     Hypertension     Migraine     Rapid heart rate        Past Surgical History:   Procedure Laterality Date    BARIATRIC SURGERY      CHOLECYSTECTOMY      GASTRIC BYPASS  2018    also had repair of perforated bowel afterwards    HERNIA REPAIR      HYSTERECTOMY      LUNG SURGERY      HI REPAIR INCISIONAL HERNIA,REDUCIBLE N/A 2022    Procedure: OPEN INCISIONAL HERNIA REPAIR WITH MESH;  Surgeon: Bharath Lomax MD;  Location: [facility];  Service: General    UPPER GASTROINTESTINAL ENDOSCOPY         Family History   Problem Relation Age of Onset    Cancer Mother         liver    Diabetes Father     Lung cancer Father     No Known Problems Sister     No Known Problems Sister     No Known Problems Sister     No Known Problems Daughter     No Known Problems Maternal Grandmother     No Known Problems Maternal Grandfather     No Known Problems Paternal Grandmother     No Known Problems Paternal Grandfather     No Known Problems Son     No Known Problems Maternal Aunt     No Known Problems Maternal Aunt     No Known Problems Paternal Aunt      I have reviewed and agree with the history as documented  E-Cigarette/Vaping    E-Cigarette Use Never User      E-Cigarette/Vaping Substances    Nicotine No     THC No     CBD No     Flavoring No     Other No     Unknown No      Social History     Tobacco Use    Smoking status: Former Smoker     Quit date:      Years since quittin 7    Smokeless tobacco: Never Used   Vaping Use    Vaping Use: Never used   Substance Use Topics    Alcohol use: Not Currently     Comment: Occs    Drug use: Not Currently     Types: Cocaine     Comment: last cocain use        Review of Systems   Constitutional: Negative for chills and fever  HENT: Positive for sore throat   Negative for hearing loss and nosebleeds  Respiratory: Negative for cough and shortness of breath  Cardiovascular: Negative for leg swelling  Gastrointestinal: Negative for abdominal pain, nausea and vomiting  Musculoskeletal: Negative for arthralgias and myalgias  Skin: Negative for rash  Neurological: Negative for dizziness and weakness  Psychiatric/Behavioral: Negative for behavioral problems  All other systems reviewed and are negative  Physical Exam  Physical Exam  Vitals and nursing note reviewed  Constitutional:       General: She is not in acute distress  Appearance: Normal appearance  She is well-developed  HENT:      Head: Normocephalic and atraumatic  Right Ear: External ear normal       Left Ear: External ear normal       Nose: Nose normal       Mouth/Throat:      Mouth: Mucous membranes are moist       Pharynx: Oropharynx is clear  Uvula midline  No uvula swelling  Comments: Adentulous, no gum lesions, + abrasion noted to posterior pharynx, no active bleeding  Eyes:      Conjunctiva/sclera: Conjunctivae normal    Cardiovascular:      Rate and Rhythm: Normal rate and regular rhythm  Pulmonary:      Effort: Pulmonary effort is normal       Breath sounds: Normal breath sounds  Abdominal:      General: Bowel sounds are normal       Palpations: Abdomen is soft  Musculoskeletal:         General: Normal range of motion  Cervical back: Normal range of motion and neck supple  No tenderness  Lymphadenopathy:      Cervical: No cervical adenopathy  Skin:     General: Skin is warm and dry  Neurological:      Mental Status: She is alert and oriented to person, place, and time     Psychiatric:         Behavior: Behavior normal          Vital Signs  ED Triage Vitals [09/05/22 1136]   Temperature Pulse Respirations Blood Pressure SpO2   98 4 °F (36 9 °C) 86 18 144/90 100 %      Temp Source Heart Rate Source Patient Position - Orthostatic VS BP Location FiO2 (%)   Oral Monitor -- -- -- Pain Score       8           Vitals:    09/05/22 1136   BP: 144/90   Pulse: 86         Visual Acuity      ED Medications  Medications   ibuprofen (MOTRIN) tablet 600 mg (600 mg Oral Given 9/5/22 1225)   Lidocaine Viscous HCl (XYLOCAINE) 2 % mucosal solution 15 mL (15 mL Swish & Spit Given 9/5/22 1226)       Diagnostic Studies  Results Reviewed     None                 XR neck soft tissue   Final Result by Ammy Villalba MD (09/05 1442)      No radiopaque foreign body  Workstation performed: [id number]                    Procedures  Procedures         ED Course  ED Course as of 09/05/22 1444   Mon Sep 05, 2022   1431 Delay in getting xray read by radiology, called twice                                             MDM  Number of Diagnoses or Management Options  Diagnosis management comments: Pt feeling better, tolerating orals in ED       Amount and/or Complexity of Data Reviewed  Tests in the radiology section of CPT®: ordered and reviewed        Disposition  Final diagnoses:   Abrasion of pharynx, initial encounter     Time reflects when diagnosis was documented in both MDM as applicable and the Disposition within this note     Time User Action Codes Description Comment    9/5/2022  1:20 PM Izabela Huston Add [S10 11XA] Abrasion of pharynx, initial encounter       ED Disposition     ED Disposition   Discharge    Condition   Stable    Date/Time   Mon Sep 5, 2022  2:34 PM    [address] [facility] discharge to home/self care  Follow-up Information     Follow up With Specialties Details Why Contact Info    Rajwinder Bean MD Family Medicine  As needed [address] [facility]      David Cadet MD Otolaryngology   [address]   [phone]            Patient's Medications   Discharge Prescriptions    No medications on file       No discharge procedures on file      PDMP Review     None          ED Provider  Electronically Signed by           Darcy Feldman PA-C  09/05/22 2082

## 2022-09-05 NOTE — DISCHARGE INSTRUCTIONS
Room temperature liquids and advance as tolerated  Use Tylenol every 4 hours or Motrin every 6 hours; you can alternate the 2 medications taking something every 3 hours for pain  Follow-up with your doctor or ENT specialist in next few days if no improvement in condition      Return to ED if worsening pain, bleeding or condition worse

## 2022-09-06 DIAGNOSIS — G43.711 INTRACTABLE CHRONIC MIGRAINE WITHOUT AURA AND WITH STATUS MIGRAINOSUS: ICD-10-CM

## 2022-09-07 RX ORDER — CYPROHEPTADINE HYDROCHLORIDE 4 MG/1
TABLET ORAL
Qty: 30 TABLET | Refills: 0 | Status: SHIPPED | OUTPATIENT
Start: 2022-09-07 | End: 2022-09-07

## 2022-09-11 DIAGNOSIS — G43.709 CHRONIC MIGRAINE WITHOUT AURA WITHOUT STATUS MIGRAINOSUS, NOT INTRACTABLE: ICD-10-CM

## 2022-09-11 DIAGNOSIS — G43.711 INTRACTABLE CHRONIC MIGRAINE WITHOUT AURA AND WITH STATUS MIGRAINOSUS: ICD-10-CM

## 2022-09-12 RX ORDER — LAMOTRIGINE 100 MG/1
TABLET ORAL
Qty: 45 TABLET | Refills: 11 | Status: SHIPPED | OUTPATIENT
Start: 2022-09-12

## 2022-10-18 ENCOUNTER — TELEPHONE (OUTPATIENT)
Dept: OTHER | Facility: OTHER | Age: 57
End: 2022-10-18

## 2022-10-18 NOTE — TELEPHONE ENCOUNTER
Patient called saying that she is schedule for a colonoscopy and a endoscopy on 10/20 and she did not receive her pre from the pharmacy patient is asking if the office can give her a call regarding this matter

## 2022-10-20 ENCOUNTER — HOSPITAL ENCOUNTER (OUTPATIENT)
Dept: GASTROENTEROLOGY | Facility: AMBULARY SURGERY CENTER | Age: 57
Setting detail: OUTPATIENT SURGERY
End: 2022-10-20
Attending: INTERNAL MEDICINE
Payer: COMMERCIAL

## 2022-10-20 ENCOUNTER — ANESTHESIA (OUTPATIENT)
Dept: GASTROENTEROLOGY | Facility: AMBULARY SURGERY CENTER | Age: 57
End: 2022-10-20

## 2022-10-20 ENCOUNTER — ANESTHESIA EVENT (OUTPATIENT)
Dept: GASTROENTEROLOGY | Facility: AMBULARY SURGERY CENTER | Age: 57
End: 2022-10-20

## 2022-10-20 VITALS
WEIGHT: 190 LBS | HEIGHT: 62 IN | OXYGEN SATURATION: 99 % | RESPIRATION RATE: 18 BRPM | DIASTOLIC BLOOD PRESSURE: 71 MMHG | BODY MASS INDEX: 34.96 KG/M2 | HEART RATE: 80 BPM | SYSTOLIC BLOOD PRESSURE: 128 MMHG | TEMPERATURE: 98.8 F

## 2022-10-20 DIAGNOSIS — R19.4 CHANGE IN BOWEL HABITS: ICD-10-CM

## 2022-10-20 DIAGNOSIS — K28.3 ACUTE GASTROJEJUNAL ANASTOMOTIC ULCER: Primary | ICD-10-CM

## 2022-10-20 DIAGNOSIS — R10.9 ABDOMINAL PAIN, UNSPECIFIED ABDOMINAL LOCATION: ICD-10-CM

## 2022-10-20 DIAGNOSIS — Z86.010 HISTORY OF COLON POLYPS: ICD-10-CM

## 2022-10-20 RX ORDER — LIDOCAINE HYDROCHLORIDE 10 MG/ML
INJECTION, SOLUTION EPIDURAL; INFILTRATION; INTRACAUDAL; PERINEURAL AS NEEDED
Status: DISCONTINUED | OUTPATIENT
Start: 2022-10-20 | End: 2022-10-20

## 2022-10-20 RX ORDER — SUCRALFATE ORAL 1 G/10ML
1 SUSPENSION ORAL 4 TIMES DAILY
Qty: 420 ML | Refills: 3 | Status: SHIPPED | OUTPATIENT
Start: 2022-10-20

## 2022-10-20 RX ORDER — PROPOFOL 10 MG/ML
INJECTION, EMULSION INTRAVENOUS AS NEEDED
Status: DISCONTINUED | OUTPATIENT
Start: 2022-10-20 | End: 2022-10-20

## 2022-10-20 RX ORDER — PANTOPRAZOLE SODIUM 40 MG/1
40 TABLET, DELAYED RELEASE ORAL 2 TIMES DAILY
Qty: 60 TABLET | Refills: 11 | Status: SHIPPED | OUTPATIENT
Start: 2022-10-20

## 2022-10-20 RX ORDER — SODIUM CHLORIDE, SODIUM LACTATE, POTASSIUM CHLORIDE, CALCIUM CHLORIDE 600; 310; 30; 20 MG/100ML; MG/100ML; MG/100ML; MG/100ML
INJECTION, SOLUTION INTRAVENOUS CONTINUOUS PRN
Status: DISCONTINUED | OUTPATIENT
Start: 2022-10-20 | End: 2022-10-20

## 2022-10-20 RX ADMIN — LIDOCAINE HYDROCHLORIDE 50 MG: 10 INJECTION, SOLUTION EPIDURAL; INFILTRATION; INTRACAUDAL at 10:24

## 2022-10-20 RX ADMIN — PROPOFOL 100 MG: 10 INJECTION, EMULSION INTRAVENOUS at 10:24

## 2022-10-20 RX ADMIN — PROPOFOL 50 MG: 10 INJECTION, EMULSION INTRAVENOUS at 10:29

## 2022-10-20 RX ADMIN — PROPOFOL 50 MG: 10 INJECTION, EMULSION INTRAVENOUS at 10:39

## 2022-10-20 RX ADMIN — PROPOFOL 50 MG: 10 INJECTION, EMULSION INTRAVENOUS at 10:43

## 2022-10-20 RX ADMIN — PROPOFOL 50 MG: 10 INJECTION, EMULSION INTRAVENOUS at 10:32

## 2022-10-20 RX ADMIN — PROPOFOL 50 MG: 10 INJECTION, EMULSION INTRAVENOUS at 10:26

## 2022-10-20 RX ADMIN — SODIUM CHLORIDE, SODIUM LACTATE, POTASSIUM CHLORIDE, AND CALCIUM CHLORIDE: .6; .31; .03; .02 INJECTION, SOLUTION INTRAVENOUS at 10:16
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
## 2022-10-20 NOTE — ANESTHESIA POSTPROCEDURE EVALUATION
Post-Op Assessment Note    CV Status:  Stable  Pain Score: 0    Pain management: adequate     Mental Status:  Alert and awake   Hydration Status:  Euvolemic   PONV Controlled:  Controlled   Airway Patency:  Patent      Post Op Vitals Reviewed: Yes      Staff: CRNA         No complications documented      BP      Temp     Pulse     Resp     SpO2

## 2022-10-20 NOTE — ANESTHESIA PREPROCEDURE EVALUATION
Procedure:  COLONOSCOPY  EGD    Relevant Problems   ANESTHESIA (within normal limits)      CARDIO   (+) Chronic migraine without aura   (+) HTN (hypertension)      ENDO (within normal limits)      GI/HEPATIC (within normal limits)      /RENAL (within normal limits)      GYN   (+) History of hysterectomy      HEMATOLOGY (within normal limits)      MUSCULOSKELETAL (within normal limits)      NEURO/PSYCH   (+) Anxiety   (+) Chronic migraine without aura   (+) History of colon polyps      PULMONARY   (+) Mild asthma      Nervous and Auditory   (+) Peripheral neuropathy        Physical Exam    Airway    Mallampati score: II  TM Distance: >3 FB  Neck ROM: full     Dental   No notable dental hx upper dentures,     Cardiovascular  Rhythm: regular, Rate: normal, Cardiovascular exam normal    Pulmonary  Pulmonary exam normal Breath sounds clear to auscultation,     Other Findings        Anesthesia Plan  ASA Score- 2     Anesthesia Type- IV sedation with anesthesia with ASA Monitors  Additional Monitors:   Airway Plan:           Plan Factors-Exercise tolerance (METS): >4 METS  Chart reviewed  EKG reviewed  Imaging results reviewed  Existing labs reviewed  Patient summary reviewed  Patient is not a current smoker  Patient did not smoke on day of surgery  Obstructive sleep apnea risk education given perioperatively  Induction- intravenous  Postoperative Plan-     Informed Consent- Anesthetic plan and risks discussed with patient  I personally reviewed this patient with the CRNA  Discussed and agreed on the Anesthesia Plan with the CRNA  Puma Galeano

## 2022-10-20 NOTE — H&P
History and Physical - [facility]  Giancarlo Conley 55 y o  female MRN: [id number]        HPI: 70-year-old female with history of chronic migraines, asthma, depression, gastric bypass surgery 4 years ago reports having abdominal pain for few months  Complaining about change in bowel habits with episodes of constipation or diarrhea  History of colon polyps      Historical Information   Past Medical History:   Diagnosis Date   • Anxiety    • Asthma    • Depression    • Hypertension    • Migraine    • Rapid heart rate      Past Surgical History:   Procedure Laterality Date   • BARIATRIC SURGERY     • CHOLECYSTECTOMY     • GASTRIC BYPASS  2018    also had repair of perforated bowel afterwards   • HERNIA REPAIR     • HYSTERECTOMY     • LUNG SURGERY     • NV REPAIR INCISIONAL HERNIA,REDUCIBLE N/A 2022    Procedure: OPEN INCISIONAL HERNIA REPAIR WITH MESH;  Surgeon: Maurilio Mcmanus MD;  Location: [facility];  Service: General   • UPPER GASTROINTESTINAL ENDOSCOPY       Social History   Social History     Substance and Sexual Activity   Alcohol Use Not Currently    Comment: Occs     Social History     Substance and Sexual Activity   Drug Use Not Currently   • Types: Cocaine    Comment: last cocain use      Social History     Tobacco Use   Smoking Status Former Smoker   • Quit date:    • Years since quittin 8   Smokeless Tobacco Never Used     Family History   Problem Relation Age of Onset   • Cancer Mother         liver   • Diabetes Father    • Lung cancer Father    • No Known Problems Sister    • No Known Problems Sister    • No Known Problems Sister    • No Known Problems Daughter    • No Known Problems Maternal Grandmother    • No Known Problems Maternal Grandfather    • No Known Problems Paternal Grandmother    • No Known Problems Paternal Grandfather    • No Known Problems Son    • No Known Problems Maternal Aunt    • No Known Problems Maternal Aunt    • No Known Problems Paternal Aunt        Meds/Allergies     (Not in a hospital admission)      Allergies   Allergen Reactions   • Latex    • Other      Annotation - [id number]: STEROIDS   • Penicillins        Objective     There were no vitals taken for this visit      PHYSICAL EXAM:    Gen: NAD  CV: S1 & S2 normal, RRR  CHEST: Clear to auscultate  ABD: soft, NT/ND, good bowel sounds  EXT: no edema    ASSESSMENT:     Abdominal pain, history of colon polyps, change in bowel habits    PLAN:    EGD and colonoscopy

## 2022-11-09 ENCOUNTER — TELEPHONE (OUTPATIENT)
Dept: GASTROENTEROLOGY | Facility: AMBULARY SURGERY CENTER | Age: 57
End: 2022-11-09

## 2022-11-09 NOTE — TELEPHONE ENCOUNTER
LVM for pt to return call re: scheduling repeat EGD per Dr Demar Cueto due FEB 2023/provided direct phone # in scheduling on pt's phone message

## 2022-11-09 NOTE — TELEPHONE ENCOUNTER
----- Message from Soniya Christianson LPN sent at 52/4/8641  7:17 PM EDT -----  Patient aware and educated via hipaa  Please call patient to schedule repeat egd 3 months-ulcer   Thank you

## 2022-12-02 ENCOUNTER — HOSPITAL ENCOUNTER (EMERGENCY)
Facility: HOSPITAL | Age: 57
Discharge: HOME | End: 2022-12-02
Attending: EMERGENCY MEDICINE

## 2022-12-02 ENCOUNTER — APPOINTMENT (EMERGENCY)
Dept: CT IMAGING | Facility: HOSPITAL | Age: 57
End: 2022-12-02

## 2022-12-02 VITALS
DIASTOLIC BLOOD PRESSURE: 98 MMHG | HEART RATE: 83 BPM | SYSTOLIC BLOOD PRESSURE: 148 MMHG | OXYGEN SATURATION: 100 % | RESPIRATION RATE: 16 BRPM | TEMPERATURE: 97.5 F

## 2022-12-02 DIAGNOSIS — K25.9 GASTRIC ULCER WITHOUT HEMORRHAGE OR PERFORATION, UNSPECIFIED CHRONICITY: ICD-10-CM

## 2022-12-02 DIAGNOSIS — K59.00 CONSTIPATION, UNSPECIFIED CONSTIPATION TYPE: ICD-10-CM

## 2022-12-02 DIAGNOSIS — R10.84 GENERALIZED ABDOMINAL PAIN: Primary | ICD-10-CM

## 2022-12-02 LAB
ALBUMIN SERPL BCP-MCNC: 3.9 G/DL (ref 3.5–5)
ALP SERPL-CCNC: 89 U/L (ref 34–104)
ALT SERPL W P-5'-P-CCNC: 12 U/L (ref 7–52)
ANION GAP SERPL CALCULATED.3IONS-SCNC: 10 MMOL/L (ref 4–13)
AST SERPL W P-5'-P-CCNC: 13 U/L (ref 13–39)
BASOPHILS # BLD AUTO: 0.04 THOUSANDS/ÂΜL (ref 0–0.1)
BASOPHILS NFR BLD AUTO: 1 % (ref 0–1)
BILIRUB SERPL-MCNC: 0.46 MG/DL (ref 0.2–1)
BUN SERPL-MCNC: 11 MG/DL (ref 5–25)
CALCIUM SERPL-MCNC: 8.9 MG/DL (ref 8.4–10.2)
CHLORIDE SERPL-SCNC: 105 MMOL/L (ref 96–108)
CO2 SERPL-SCNC: 24 MMOL/L (ref 21–32)
CREAT SERPL-MCNC: 0.89 MG/DL (ref 0.6–1.3)
EOSINOPHIL # BLD AUTO: 0.28 THOUSAND/ÂΜL (ref 0–0.61)
EOSINOPHIL NFR BLD AUTO: 5 % (ref 0–6)
ERYTHROCYTE [DISTWIDTH] IN BLOOD BY AUTOMATED COUNT: 15.6 % (ref 11.6–15.1)
FLUAV RNA RESP QL NAA+PROBE: NEGATIVE
FLUBV RNA RESP QL NAA+PROBE: NEGATIVE
GFR SERPL CREATININE-BSD FRML MDRD: 72 ML/MIN/1.73SQ M
GLUCOSE SERPL-MCNC: 97 MG/DL (ref 65–140)
HCT VFR BLD AUTO: 40.6 % (ref 34.8–46.1)
HGB BLD-MCNC: 12.6 G/DL (ref 11.5–15.4)
IMM GRANULOCYTES # BLD AUTO: 0.03 THOUSAND/UL (ref 0–0.2)
IMM GRANULOCYTES NFR BLD AUTO: 1 % (ref 0–2)
LACTATE SERPL-SCNC: 0.8 MMOL/L (ref 0.5–2)
LIPASE SERPL-CCNC: 25 U/L (ref 11–82)
LYMPHOCYTES # BLD AUTO: 1.4 THOUSANDS/ÂΜL (ref 0.6–4.47)
LYMPHOCYTES NFR BLD AUTO: 25 % (ref 14–44)
MAGNESIUM SERPL-MCNC: 2.2 MG/DL (ref 1.9–2.7)
MCH RBC QN AUTO: 23.7 PG (ref 26.8–34.3)
MCHC RBC AUTO-ENTMCNC: 31 G/DL (ref 31.4–37.4)
MCV RBC AUTO: 77 FL (ref 82–98)
MONOCYTES # BLD AUTO: 0.43 THOUSAND/ÂΜL (ref 0.17–1.22)
MONOCYTES NFR BLD AUTO: 8 % (ref 4–12)
NEUTROPHILS # BLD AUTO: 3.52 THOUSANDS/ÂΜL (ref 1.85–7.62)
NEUTS SEG NFR BLD AUTO: 60 % (ref 43–75)
NRBC BLD AUTO-RTO: 0 /100 WBCS
PLATELET # BLD AUTO: 328 THOUSANDS/UL (ref 149–390)
PMV BLD AUTO: 9.4 FL (ref 8.9–12.7)
POTASSIUM SERPL-SCNC: 3.7 MMOL/L (ref 3.5–5.3)
PROT SERPL-MCNC: 6.8 G/DL (ref 6.4–8.4)
RBC # BLD AUTO: 5.31 MILLION/UL (ref 3.81–5.12)
RSV RNA RESP QL NAA+PROBE: NEGATIVE
SARS-COV-2 RNA RESP QL NAA+PROBE: NEGATIVE
SODIUM SERPL-SCNC: 139 MMOL/L (ref 135–147)
WBC # BLD AUTO: 5.7 THOUSAND/UL (ref 4.31–10.16)

## 2022-12-02 RX ORDER — ONDANSETRON 2 MG/ML
4 INJECTION INTRAMUSCULAR; INTRAVENOUS ONCE
Status: COMPLETED | OUTPATIENT
Start: 2022-12-02 | End: 2022-12-02

## 2022-12-02 RX ORDER — DOCUSATE SODIUM 100 MG/1
100 CAPSULE, LIQUID FILLED ORAL 2 TIMES DAILY
Qty: 6 CAPSULE | Refills: 0 | Status: SHIPPED | OUTPATIENT
Start: 2022-12-02 | End: 2022-12-05

## 2022-12-02 RX ORDER — OMEPRAZOLE 40 MG/1
40 CAPSULE, DELAYED RELEASE ORAL DAILY
Qty: 30 CAPSULE | Refills: 0 | Status: SHIPPED | OUTPATIENT
Start: 2022-12-02 | End: 2023-01-01

## 2022-12-02 RX ORDER — OXYCODONE HYDROCHLORIDE AND ACETAMINOPHEN 5; 325 MG/1; MG/1
1 TABLET ORAL EVERY 4 HOURS PRN
Qty: 6 TABLET | Refills: 0 | Status: SHIPPED | OUTPATIENT
Start: 2022-12-02 | End: 2022-12-08

## 2022-12-02 RX ORDER — MORPHINE SULFATE 4 MG/ML
4 INJECTION, SOLUTION INTRAMUSCULAR; INTRAVENOUS ONCE
Status: COMPLETED | OUTPATIENT
Start: 2022-12-02 | End: 2022-12-02

## 2022-12-02 RX ORDER — SUCRALFATE 1 G/1
1 TABLET ORAL 4 TIMES DAILY
Qty: 120 TABLET | Refills: 0 | Status: SHIPPED | OUTPATIENT
Start: 2022-12-02 | End: 2023-01-01

## 2022-12-02 RX ADMIN — IOHEXOL 100 ML: 350 INJECTION, SOLUTION INTRAVENOUS at 07:55

## 2022-12-02 RX ADMIN — MORPHINE SULFATE 4 MG: 4 INJECTION, SOLUTION INTRAMUSCULAR; INTRAVENOUS at 06:45

## 2022-12-02 RX ADMIN — SODIUM CHLORIDE 1000 ML: 0.9 INJECTION, SOLUTION INTRAVENOUS at 06:45

## 2022-12-02 RX ADMIN — MORPHINE SULFATE 4 MG: 4 INJECTION INTRAVENOUS at 07:34

## 2022-12-02 RX ADMIN — ONDANSETRON 4 MG: 2 INJECTION INTRAMUSCULAR; INTRAVENOUS at 06:45

## 2022-12-02 NOTE — DISCHARGE INSTR - AVS FIRST PAGE
Omeprazole (Prilosec) sent to your pharmacy  Please take as prescribed x 30 days  Sucralfate (Carafate) already prescribed to you by your GI doctor  If you do not have the prescription filled, another one was sent  Please take x 30 days  Take over the counter Tylenol (acetaminophen) as needed for mild-mod pain  Do not exceed 4g per day  Do not take NSAIDs: Advil, Motrin, Aleve, Naprosyn, Celebrex, Celecoxib, Toradol, Ketorlac    Short-term script sent to pharmacy to be take as needed for severe pain if Tylenol is not covering  Do not take within 4 hours of taking Tylenol  Be aware that this medication contains Tylenol so do not take > 4g tylenol per day

## 2022-12-02 NOTE — ED CARE HANDOFF
Emergency Department Sign Out Note        Sign out and transfer of care from Dr Hari Valadez See Separate Emergency Department note  The patient, Katie Elizondo, was evaluated by the previous provider for abdominal pain    Workup Completed:  Labs Reviewed   CBC AND DIFFERENTIAL - Abnormal       Result Value Ref Range Status    WBC 5 70  4 31 - 10 16 Thousand/uL Final    RBC 5 31 (*) 3 81 - 5 12 Million/uL Final    Hemoglobin 12 6  11 5 - 15 4 g/dL Final    Hematocrit 40 6  34 8 - 46 1 % Final    MCV 77 (*) 82 - 98 fL Final    MCH 23 7 (*) 26 8 - 34 3 pg Final    MCHC 31 0 (*) 31 4 - 37 4 g/dL Final    RDW 15 6 (*) 11 6 - 15 1 % Final    MPV 9 4  8 9 - 12 7 fL Final    Platelets 069  993 - 390 Thousands/uL Final    nRBC 0  /100 WBCs Final    Neutrophils Relative 60  43 - 75 % Final    Immat GRANS % 1  0 - 2 % Final    Lymphocytes Relative 25  14 - 44 % Final    Monocytes Relative 8  4 - 12 % Final    Eosinophils Relative 5  0 - 6 % Final    Basophils Relative 1  0 - 1 % Final    Neutrophils Absolute 3 52  1 85 - 7 62 Thousands/µL Final    Immature Grans Absolute 0 03  0 00 - 0 20 Thousand/uL Final    Lymphocytes Absolute 1 40  0 60 - 4 47 Thousands/µL Final    Monocytes Absolute 0 43  0 17 - 1 22 Thousand/µL Final    Eosinophils Absolute 0 28  0 00 - 0 61 Thousand/µL Final    Basophils Absolute 0 04  0 00 - 0 10 Thousands/µL Final   COVID19, INFLUENZA A/B, RSV PCR, [facility] - Normal    SARS-CoV-2 Negative  Negative Final    INFLUENZA A PCR Negative  Negative Final    INFLUENZA B PCR Negative  Negative Final    RSV PCR Negative  Negative Final    Narrative:     FOR PEDIATRIC PATIENTS - copy/paste COVID Guidelines URL to browser: [email or web address] [email or web address]  ashx    SARS-CoV-2 assay is a Nucleic Acid Amplification assay intended for the  qualitative detection of nucleic acid from SARS-CoV-2 in nasopharyngeal  swabs   Results are for the presumptive identification of SARS-CoV-2 RNA     Positive results are indicative of infection with SARS-CoV-2, the virus  causing COVID-19, but do not rule out bacterial infection or co-infection  with other viruses  Laboratories within the [place] and its  territories are required to report all positive results to the appropriate  public health authorities  Negative results do not preclude SARS-CoV-2  infection and should not be used as the sole basis for treatment or other  patient management decisions  Negative results must be combined with  clinical observations, patient history, and epidemiological information  This test has not been FDA cleared or approved  This test has been authorized by FDA under an Emergency Use Authorization  (EUA)  This test is only authorized for the duration of time the  declaration that circumstances exist justifying the authorization of the  emergency use of an in vitro diagnostic tests for detection of SARS-CoV-2  virus and/or diagnosis of COVID-19 infection under section 564(b)(1) of  the Act, 21 U  S C  484JKB-1(X)(5), unless the authorization is terminated  or revoked sooner  The test has been validated but independent review by FDA  and CLIA is pending  Test performed using [employer] GeneXpert: This RT-PCR assay targets N2,  a region unique to SARS-CoV-2  A conserved region in the E-gene was chosen  for pan-Sarbecovirus detection which includes SARS-CoV-2  According to CMS-2020-01-R, this platform meets the definition of high-throughput technology  MAGNESIUM - Normal    Magnesium 2 2  1 9 - 2 7 mg/dL Final   LIPASE - Normal    Lipase 25  11 - 82 u/L Final   LACTIC ACID, PLASMA - Normal    LACTIC ACID 0 8  0 5 - 2 0 mmol/L Final    Narrative:     Result may be elevated if tourniquet was used during collection     COMPREHENSIVE METABOLIC PANEL    Sodium 999  135 - 147 mmol/L Final    Potassium 3 7  3 5 - 5 3 mmol/L Final    Chloride 105  96 - 108 mmol/L Final    CO2 24  21 - 32 mmol/L Final    ANION GAP 10 4 - 13 mmol/L Final    BUN 11  5 - 25 mg/dL Final    Creatinine 0 89  0 60 - 1 30 mg/dL Final    Comment: Standardized to IDMS reference method    Glucose 97  65 - 140 mg/dL Final    Comment: If the patient is fasting, the ADA then defines impaired fasting glucose as > 100 mg/dL and diabetes as > or equal to 123 mg/dL  Specimen collection should occur prior to Sulfasalazine administration due to the potential for falsely depressed results  Specimen collection should occur prior to Sulfapyridine administration due to the potential for falsely elevated results  Calcium 8 9  8 4 - 10 2 mg/dL Final    AST 13  13 - 39 U/L Final    Comment: Specimen collection should occur prior to Sulfasalazine administration due to the potential for falsely depressed results  ALT 12  7 - 52 U/L Final    Comment: Specimen collection should occur prior to Sulfasalazine administration due to the potential for falsely depressed results  Alkaline Phosphatase 89  34 - 104 U/L Final    Total Protein 6 8  6 4 - 8 4 g/dL Final    Albumin 3 9  3 5 - 5 0 g/dL Final    Total Bilirubin 0 46  0 20 - 1 00 mg/dL Final    eGFR 72  ml/min/1 73sq m Final    Narrative:     [place] guidelines for Chronic Kidney Disease (CKD):   •  Stage 1 with normal or high GFR (GFR > 90 mL/min/1 73 square meters)  •  Stage 2 Mild CKD (GFR = 60-89 mL/min/1 73 square meters)  •  Stage 3A Moderate CKD (GFR = 45-59 mL/min/1 73 square meters)  •  Stage 3B Moderate CKD (GFR = 30-44 mL/min/1 73 square meters)  •  Stage 4 Severe CKD (GFR = 15-29 mL/min/1 73 square meters)  •  Stage 5 End Stage CKD (GFR <15 mL/min/1 73 square meters)  Note: GFR calculation is accurate only with a steady state creatinine   UA W REFLEX TO MICROSCOPIC WITH REFLEX TO CULTURE     CT abdomen pelvis with contrast   Final Result      No evidence of acute abdominopelvic process        Small amount of enteric contrast is present in the post Dominic-en-Y bypass excluded stomach and biliopancreatic limb  This finding is new since August 2022 and may be sequela of staple line dehiscence with gastrogastric fistula or retrograde filling of the    excluded stomach via biliopancreatic limb reflux  Colonic diverticulosis  This study demonstrates a significant  finding and was documented as such in [facility] for liaison and referring practitioner notification  Workstation performed: [id number]               ED Course / Workup Pending (followup): This is 58y old came for having abdominal pain for 2 days and has One BM today  Pt has no vomiting no nausea, pt denies fever  Pt has Dominic-en-Y bypass done by dr Brisa Trejo 4 y ago  P/E shows diffuse abdominal tenderness  Labs reviewed and shows WBC 5 7  Case consulted with dr Brisa Trejo , who did surgery 4 y ago and he responded  She needs Bariatric consultation  es I did but I do not do Bariatric surgery anymore  Whoever is on call for [facility] Bariatric should be called   I am operating right now   Your texts are coming in as priority texts and I have to scrub out to reply the page  Is it okay that I review the CT scan after I am done with the case  Then we consulted Bariatric surgeon dr Christopher Pena ; and responded back ; Hi sorry was just scrubbing out  There is nothing emergent to do  She absolutely must stop NSAIDS and continue with protonix 40mg BID and Carafate 1g QID for ulcer  It may be a GG fistula but this can be addressed with outpatient follow up with us and GI for repeat EGD to ensure ulcer is gone and investigate for fistula  Case discussed with and informed her of CT findings and consult with general and Bariatric surgeons  Dr Brisa Trejo texted again;     Hi  I reviewed the CT scan  Her bowel looks fine  No obstruction  He hernia has not recurred  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 but for that she need outpatient EGD and barium swallow  She will need bid protonix and zofran   The surgical PA will come and see her and write a note  Thanks  Nothing acutely surgical based on the scans  Dr Debra Kraus came to er and evaluated pt  Procedures  MDM        Disposition  Final diagnoses:   Generalized abdominal pain     Time reflects when diagnosis was documented in both MDM as applicable and the Disposition within this note     Time User Action Codes Description Comment    12/2/2022  6:29 AM Jonathan Reese Add [R10 84] Generalized abdominal pain       ED Disposition     None      Follow-up Information    None       Patient's Medications   Discharge Prescriptions    No medications on file     No discharge procedures on file         ED Provider  Electronically Signed by     Daiana Collins MD  12/03/22 9178

## 2022-12-02 NOTE — CONSULTS
Consultation - General Surgery   Dennis Payor 65 y o  female MRN: [id number]  Unit/Bed#: ED 10 Encounter: [id number]    ASSESSMENT:  66 y/o female with multiple abdominal surgeries including gastric bypass, most recent surgery open incisional hernia with mesh performed in January of this year  Presents w/ acute on chronic abdominal pain, the pain has gotten worse over the last few days  She is having bowel function  Abdominal exam is benign  CT shows no signs of obstruction  On CT imaging and exam here is no sign of hernia reoccurrence  Afebrile, VSS  No leukocytosis  CT shows no signs of infectious process  CT imaging reviewed with Dr Merlin Emery  Enteric contrast is present in the jejunum and small amount of contrast is present in the excluded stomach and biliopancreatic limb  There is a possibility of gastrogastric fistula or there could be retrograde filling of the excluded stomach via biliopancreatic limb reflux  At this time she is stable and this won't require emergent surgery  She was told to FU w/ her bariatric surgeon  Peptic Ulcer Disease   EGD from 10/2022 reviewed with the patient  She has a peptic ulcer at the [address] [place] [address] anastomosis  GI had prescribed her Carafate/PPI x 4 weeks, however protonix is not covered by her insurance so she was not taking this  Her pain is worse with meals, therefore is most likely related to PUD  Diverticulosis  C-scope results from 10/2022 also reviewed  Benign, shows diverticulosis  There was a poor prep, recommended FU in 2 yrs per GI  Discussed preventative tx and high fiber diet  Constipation, chronic  Pt reports hx of long intervals (multiple days, sometimes up to 10 days) of constipation  Risk factors for constipation reviewed  Discussed preventative measures such as lifestyle habits and bowel regimen  PLAN:   · No need for acute surgical intervention  · Prescription sent for Omeprazole as this may be a cheaper alt to protonix     · Continue Carafate  · Follow high fiber, anti-ulcerogenic diet  · Avoid NSAIDs, stop Aleve  Take Tylenol as needed  Short term script for Percocet sent, take only as needed for severe pain  · Colace stool softner sent to take while taking percocet  · Continue daily OTC bowel regimen as needed  · All preventative life style modifications were reviewed  · FU w/ bariatric surgeon  · FU w/ GI    -----------------------------------------------------------------------------------  SUBJECTIVE:    Chief Complaint:  Abdominal pain worse over the last 2 days    HPI:  Omero Barney is a 65 y o  female with PMH anxiety, depression, asthma, migraine, ans significant PSH including gastric bypass, open incisional hernia repair with mesh, cholecystetomy, hysterectomy, and lung surgery who presents with acute on chronic abdominal pain  States she has had chronic abdominal pain since her surgery (last surgery was incisional hernia repair in Jan of this year)  Over the last 2 days pain has gotten worse  Pain is described as aching, sharp localized to the epigastric region and can radiate down into the entire abdomen  She was constipated 2 days ago and took multiple laxative including suppositories, this did induce BMs  Denies any blood in the stool  No N/V  Does note a decreased appetite b/c food makes the pain worse  She recently had an upper and lower scope performed at [facility] in October  Per chart review the EGD revealed 1 5 cm ulceration at [address] [place] [address] anastomosis  C-scope showed diverticulosis, no polyps were removed (however there was a poor preop and pt has hx of polyps)    After reviewing the EGD/c-scope results with the pt and obtaining through hx she does admit to taking Alieve for her migraines, she knew she wasn't supposed to have Advil but did not realize this was also an NSAID  Denies tobacco use, ETOH use, or recreational drug use       ROS:  Review of Systems   Constitutional: Positive for appetite change (eating less 2/2 abdominal pain associated with meals )  Negative for activity change, chills and fever  HENT: Negative  Respiratory: Negative for cough and shortness of breath  Cardiovascular: Negative for chest pain and palpitations  Gastrointestinal: Positive for abdominal pain (acute on chronic) and constipation (chronic)  Negative for abdominal distention, blood in stool, diarrhea, nausea and vomiting  Genitourinary: Negative for difficulty urinating and dysuria  Musculoskeletal: Negative  Skin: Negative  Neurological: Negative  Psychiatric/Behavioral: Negative          Historical Information   Past Medical History:   Diagnosis Date   • Anxiety    • Asthma    • Depression    • Hypertension    • Migraine    • Rapid heart rate      Past Surgical History:   Procedure Laterality Date   • BARIATRIC SURGERY     • CHOLECYSTECTOMY     • GASTRIC BYPASS  2018    also had repair of perforated bowel afterwards   • HERNIA REPAIR     • HYSTERECTOMY     • LUNG SURGERY     • LA REPAIR INCISIONAL HERNIA,REDUCIBLE N/A 2022    Procedure: OPEN INCISIONAL HERNIA REPAIR WITH MESH;  Surgeon: Gaurav Mix MD;  Location: [facility]  Service: General   • UPPER GASTROINTESTINAL ENDOSCOPY       Social History   Social History     Substance and Sexual Activity   Alcohol Use Not Currently    Comment: [facility]     Social History     Substance and Sexual Activity   Drug Use Not Currently   • Types: Cocaine    Comment: last cocain use      Social History     Tobacco Use   Smoking Status Former   • Types: Cigarettes   • Quit date:    • Years since quittin 9   Smokeless Tobacco Never       Family History:   Family History   Problem Relation Age of Onset   • Cancer Mother         liver   • Diabetes Father    • Lung cancer Father    • No Known Problems Sister    • No Known Problems Sister    • No Known Problems Sister    • No Known Problems Daughter    • No Known Problems Maternal Grandmother    • No Known Problems Maternal Grandfather    • No Known Problems Paternal Grandmother    • No Known Problems Paternal Grandfather    • No Known Problems Son    • No Known Problems Maternal Aunt    • No Known Problems Maternal Aunt    • No Known Problems Paternal Aunt        Meds/Allergies   all medications and allergies reviewed  Allergies   Allergen Reactions   • Latex    • Other      Annotation - [id number]: STEROIDS   • Penicillins          OBJECTIVE:  First Vitals:   Blood Pressure: 148/98 (12/02/22 0606)  Pulse: 83 (12/02/22 0606)  Temperature: 97 5 °F (36 4 °C) (12/02/22 0606)  Respirations: 16 (12/02/22 0606)  SpO2: 100 % (12/02/22 0606) There is no height or weight on file to calculate BMI  Current Vitals:   Blood Pressure: 148/98 (12/02/22 0606)  Pulse: 83 (12/02/22 0606)  Temperature: 97 5 °F (36 4 °C) (12/02/22 0606)  Respirations: 16 (12/02/22 0606)  SpO2: 100 % (12/02/22 0606)   I/Os:  No intake or output data in the 24 hours ending 12/02/22 1403  Lines/Drains:  Invasive Devices     Peripheral Intravenous Line  Duration           Peripheral IV 12/02/22 Dorsal (posterior); Right Forearm <1 day                Physical Exam  Vitals reviewed  Constitutional:       General: She is not in acute distress  Appearance: She is obese  She is not toxic-appearing  HENT:      Head: Normocephalic and atraumatic  Cardiovascular:      Rate and Rhythm: Normal rate and regular rhythm  Pulmonary:      Effort: Pulmonary effort is normal       Breath sounds: No wheezing, rhonchi or rales  Abdominal:      General: Bowel sounds are normal  There is no distension  Palpations: Abdomen is soft  There is no mass  Tenderness: There is no abdominal tenderness  There is no guarding or rebound  Hernia: No hernia is present  Comments: Abdominal straie  Prev abd surgical scars   Musculoskeletal:         General: No tenderness  Right lower leg: No edema  Left lower leg: No edema     Skin:     General: Skin is warm and dry  Neurological:      General: No focal deficit present  Mental Status: She is alert  Lab Results: I have personally reviewed pertinent lab results      Recent Results (from the past 36 hour(s))   CBC and differential    Collection Time: 12/02/22  6:30 AM   Result Value Ref Range    WBC 5 70 4 31 - 10 16 Thousand/uL    RBC 5 31 (H) 3 81 - 5 12 Million/uL    Hemoglobin 12 6 11 5 - 15 4 g/dL    Hematocrit 40 6 34 8 - 46 1 %    MCV 77 (L) 82 - 98 fL    MCH 23 7 (L) 26 8 - 34 3 pg    MCHC 31 0 (L) 31 4 - 37 4 g/dL    RDW 15 6 (H) 11 6 - 15 1 %    MPV 9 4 8 9 - 12 7 fL    Platelets 293 327 - 779 Thousands/uL    nRBC 0 /100 WBCs    Neutrophils Relative 60 43 - 75 %    Immat GRANS % 1 0 - 2 %    Lymphocytes Relative 25 14 - 44 %    Monocytes Relative 8 4 - 12 %    Eosinophils Relative 5 0 - 6 %    Basophils Relative 1 0 - 1 %    Neutrophils Absolute 3 52 1 85 - 7 62 Thousands/µL    Immature Grans Absolute 0 03 0 00 - 0 20 Thousand/uL    Lymphocytes Absolute 1 40 0 60 - 4 47 Thousands/µL    Monocytes Absolute 0 43 0 17 - 1 22 Thousand/µL    Eosinophils Absolute 0 28 0 00 - 0 61 Thousand/µL    Basophils Absolute 0 04 0 00 - 0 10 Thousands/µL   Comprehensive metabolic panel    Collection Time: 12/02/22  6:30 AM   Result Value Ref Range    Sodium 139 135 - 147 mmol/L    Potassium 3 7 3 5 - 5 3 mmol/L    Chloride 105 96 - 108 mmol/L    CO2 24 21 - 32 mmol/L    ANION GAP 10 4 - 13 mmol/L    BUN 11 5 - 25 mg/dL    Creatinine 0 89 0 60 - 1 30 mg/dL    Glucose 97 65 - 140 mg/dL    Calcium 8 9 8 4 - 10 2 mg/dL    AST 13 13 - 39 U/L    ALT 12 7 - 52 U/L    Alkaline Phosphatase 89 34 - 104 U/L    Total Protein 6 8 6 4 - 8 4 g/dL    Albumin 3 9 3 5 - 5 0 g/dL    Total Bilirubin 0 46 0 20 - 1 00 mg/dL    eGFR 72 ml/min/1 73sq m   Magnesium    Collection Time: 12/02/22  6:30 AM   Result Value Ref Range    Magnesium 2 2 1 9 - 2 7 mg/dL   Lipase    Collection Time: 12/02/22  6:30 AM   Result Value Ref Range    Lipase 25 11 - 82 u/L   Lactic acid, plasma    Collection Time: 12/02/22  6:45 AM   Result Value Ref Range    LACTIC ACID 0 8 0 5 - 2 0 mmol/L   COVID19, Influenza A/B, RSV PCR, [facility]    Collection Time: 12/02/22  6:46 AM    Specimen: Nose; Nares   Result Value Ref Range    SARS-CoV-2 Negative Negative    INFLUENZA A PCR Negative Negative    INFLUENZA B PCR Negative Negative    RSV PCR Negative Negative     Imaging: I have personally reviewed pertinent reports  CT abdomen pelvis with contrast    Result Date: 12/2/2022  Impression: No evidence of acute abdominopelvic process  Small amount of enteric contrast is present in the post Dominic-en-Y bypass excluded stomach and biliopancreatic limb  This finding is new since August 2022 and may be sequela of staple line dehiscence with gastrogastric fistula or retrograde filling of the  excluded stomach via biliopancreatic limb reflux  Colonic diverticulosis  This study demonstrates a significant  finding and was documented as such in [facility] for liaison and referring practitioner notification  Workstation performed: [id number]     EKG, Pathology, and Other Studies: I have personally reviewed pertinent reports          RAFAEL Aldana  12/2/2022

## 2022-12-02 NOTE — DISCHARGE INSTRUCTIONS
Follow up with dr Debra Kraus as instructed  Take medications as instructed  Labs Reviewed   CBC AND DIFFERENTIAL - Abnormal       Result Value Ref Range Status    WBC 5 70  4 31 - 10 16 Thousand/uL Final    RBC 5 31 (*) 3 81 - 5 12 Million/uL Final    Hemoglobin 12 6  11 5 - 15 4 g/dL Final    Hematocrit 40 6  34 8 - 46 1 % Final    MCV 77 (*) 82 - 98 fL Final    MCH 23 7 (*) 26 8 - 34 3 pg Final    MCHC 31 0 (*) 31 4 - 37 4 g/dL Final    RDW 15 6 (*) 11 6 - 15 1 % Final    MPV 9 4  8 9 - 12 7 fL Final    Platelets 201  976 - 390 Thousands/uL Final    nRBC 0  /100 WBCs Final    Neutrophils Relative 60  43 - 75 % Final    Immat GRANS % 1  0 - 2 % Final    Lymphocytes Relative 25  14 - 44 % Final    Monocytes Relative 8  4 - 12 % Final    Eosinophils Relative 5  0 - 6 % Final    Basophils Relative 1  0 - 1 % Final    Neutrophils Absolute 3 52  1 85 - 7 62 Thousands/µL Final    Immature Grans Absolute 0 03  0 00 - 0 20 Thousand/uL Final    Lymphocytes Absolute 1 40  0 60 - 4 47 Thousands/µL Final    Monocytes Absolute 0 43  0 17 - 1 22 Thousand/µL Final    Eosinophils Absolute 0 28  0 00 - 0 61 Thousand/µL Final    Basophils Absolute 0 04  0 00 - 0 10 Thousands/µL Final   COVID19, INFLUENZA A/B, RSV PCR, [facility] - Normal    SARS-CoV-2 Negative  Negative Final    INFLUENZA A PCR Negative  Negative Final    INFLUENZA B PCR Negative  Negative Final    RSV PCR Negative  Negative Final    Narrative:     FOR PEDIATRIC PATIENTS - copy/paste COVID Guidelines URL to browser: [email or web address]  [place] [employer]    SARS-CoV-2 assay is a Nucleic Acid Amplification assay intended for the  qualitative detection of nucleic acid from SARS-CoV-2 in nasopharyngeal  swabs  Results are for the presumptive identification of SARS-CoV-2 RNA      Positive results are indicative of infection with SARS-CoV-2, the virus  causing COVID-19, but do not rule out bacterial infection or co-infection  with other viruses  Laboratories within the [place] and its  territories are required to report all positive results to the appropriate  public health authorities  Negative results do not preclude SARS-CoV-2  infection and should not be used as the sole basis for treatment or other  patient management decisions  Negative results must be combined with  clinical observations, patient history, and epidemiological information  This test has not been FDA cleared or approved  This test has been authorized by FDA under an Emergency Use Authorization  (EUA)  This test is only authorized for the duration of time the  declaration that circumstances exist justifying the authorization of the  emergency use of an in vitro diagnostic tests for detection of SARS-CoV-2  virus and/or diagnosis of COVID-19 infection under section 564(b)(1) of  the Act, 21 U  S C  760WBG-8(H)(9), unless the authorization is terminated  or revoked sooner  The test has been validated but independent review by FDA  and CLIA is pending  Test performed using [employer]: This RT-PCR assay targets N2,  a region unique to SARS-CoV-2  A conserved region in the E-gene was chosen  for pan-Sarbecovirus detection which includes SARS-CoV-2  According to CMS-2020-01-R, this platform meets the definition of high-throughput technology  MAGNESIUM - Normal    Magnesium 2 2  1 9 - 2 7 mg/dL Final   LIPASE - Normal    Lipase 25  11 - 82 u/L Final   LACTIC ACID, PLASMA - Normal    LACTIC ACID 0 8  0 5 - 2 0 mmol/L Final    Narrative:     Result may be elevated if tourniquet was used during collection     COMPREHENSIVE METABOLIC PANEL    Sodium 244  135 - 147 mmol/L Final    Potassium 3 7  3 5 - 5 3 mmol/L Final    Chloride 105  96 - 108 mmol/L Final    CO2 24  21 - 32 mmol/L Final    ANION GAP 10  4 - 13 mmol/L Final    BUN 11  5 - 25 mg/dL Final    Creatinine 0 89  0 60 - 1 30 mg/dL Final    Comment: Standardized to IDMS reference method    Glucose 97  65 - 140 mg/dL Final    Comment: If the patient is fasting, the ADA then defines impaired fasting glucose as > 100 mg/dL and diabetes as > or equal to 123 mg/dL  Specimen collection should occur prior to Sulfasalazine administration due to the potential for falsely depressed results  Specimen collection should occur prior to Sulfapyridine administration due to the potential for falsely elevated results  Calcium 8 9  8 4 - 10 2 mg/dL Final    AST 13  13 - 39 U/L Final    Comment: Specimen collection should occur prior to Sulfasalazine administration due to the potential for falsely depressed results  ALT 12  7 - 52 U/L Final    Comment: Specimen collection should occur prior to Sulfasalazine administration due to the potential for falsely depressed results  Alkaline Phosphatase 89  34 - 104 U/L Final    Total Protein 6 8  6 4 - 8 4 g/dL Final    Albumin 3 9  3 5 - 5 0 g/dL Final    Total Bilirubin 0 46  0 20 - 1 00 mg/dL Final    eGFR 72  ml/min/1 73sq m Final    Narrative:     [place] guidelines for Chronic Kidney Disease (CKD):     Stage 1 with normal or high GFR (GFR > 90 mL/min/1 73 square meters)    Stage 2 Mild CKD (GFR = 60-89 mL/min/1 73 square meters)    Stage 3A Moderate CKD (GFR = 45-59 mL/min/1 73 square meters)    Stage 3B Moderate CKD (GFR = 30-44 mL/min/1 73 square meters)    Stage 4 Severe CKD (GFR = 15-29 mL/min/1 73 square meters)    Stage 5 End Stage CKD (GFR <15 mL/min/1 73 square meters)  Note: GFR calculation is accurate only with a steady state creatinine   UA W REFLEX TO MICROSCOPIC WITH REFLEX TO CULTURE     CT abdomen pelvis with contrast   Final Result      No evidence of acute abdominopelvic process  Small amount of enteric contrast is present in the post Dominic-en-Y bypass excluded stomach and biliopancreatic limb   This finding is new since August 2022 and may be sequela of staple line dehiscence with gastrogastric fistula or retrograde filling of the excluded stomach via biliopancreatic limb reflux  Colonic diverticulosis  This study demonstrates a significant  finding and was documented as such in [facility] for liaison and referring practitioner notification  Workstation performed: [id number]                     Tips to Relieve Constipation:  -Drink plenty of fluids (water) daily  -Exercise regularly, stay physically active   -Eat plenty of dietary fiber (fresh fruits/vegetables, whole grains, beans)  -Goal amount of fiber: 20 to 35 g/day  -Psyllium seed (eg, Metamucil), methylcellulose (eg, Citrucel), calcium polycarbophil (eg, FiberCon), and wheat dextrin (eg, Benefiber) are all safe, over-the-counter fiber supplements that help to soften/bulken up the stool, to make bowel movements more regular and prevent straining    -Limit the use of other over-the-counter laxatives, however if you have done all of the above and still have not had a bowel movement for a few days, take an over the counter laxative as needed to promote a bowel movement  Start with Izabela-lax, as this is more gentle   Do not take laxatives routinely, if you are having to use them frequently consult your primary care provider    -Some medications can contribute to chronic constipation, seek medical advice from appropriate provider to discuss your medication list

## 2022-12-02 NOTE — ED PROVIDER NOTES
History  Chief Complaint   Patient presents with   • Abdominal Pain     PT presents to ED from home w/ generalized abd pain starting two days ago  Pt had BM this morning  PT (+) hx abd issues, had colonoscopy two months ago  Patient is a 16-year-old female seen in the emergency department with concern for constant generalized abdominal pain for approximately the past 2 days  Patient notes no definite clear exacerbating or alleviating factors for the pain  Patient notes some associated constipation, which was relieved with a laxative and stool softener at home  Patient notes history of multiple abdominal surgeries in the past, including gastric sleeve, gastric bypass  Patient notes no nausea or vomiting  Patient states that she has been passing flatus  Patient notes some additional abdominal bloating  Patient notes no fever chest, chest pain, shortness of breath, or urinary symptoms  Prior to Admission Medications   Prescriptions Last Dose Informant Patient Reported? Taking? B-D 3CC LUER-ARACELIS SYR 25GX1/2" 25G X 1-1/2" 3 ML MISC   No No   Sig: USE FOR TORADOL INTRAMUSCULAR INJECTIONS   FLUoxetine (PROzac) 40 MG capsule   Yes No   Sig: TAKE 2 CAPSULES BY MOUTH EVERY DAY IN THE MORNING   Syringe/Needle, Disp, (SYRINGE 3CC/64MP3-9/4") 27G X 1-1/4" 3 ML MISC   No No   Sig: Use for Toradol intramuscular injections  budesonide-formoterol (SYMBICORT) 160-4 5 mcg/act inhaler   Yes No   Sig: Inhale 2 puffs 2 (two) times a day Morning and evening   clonazePAM (KlonoPIN) 0 5 mg tablet   Yes No   Sig: Take 0 5 mg by mouth daily   cyproheptadine (PERIACTIN) 4 mg tablet   No No   Sig: TAKE 1 TABLET(4 MG) BY MOUTH DAILY AT BEDTIME AS NEEDED FOR ALLERGIES OR HEADACHE   ketorolac (TORADOL) 30 mg/mL injection   No No   Sig: INJECT 1-2 ML 'S IN THE MUSCLE ONCE PER 24 HOURS AS NEEDED FOR MIGRAINE   NO MORE THAN 2 INJECTIONS PER WEEK   lamoTRIgine (LaMICtal) 100 mg tablet   No No   Sig: TAKE 1/2 TABLET BY MOUTH IN THE MORNING AND 1 TABLET AT BEDTIME   pantoprazole (PROTONIX) 40 mg tablet   No No   Sig: Take 1 tablet (40 mg total) by mouth 2 (two) times a day   sucralfate (CARAFATE) 1 g/10 mL suspension   No No   Sig: Take 10 mL (1 g total) by mouth 4 (four) times a day      Facility-Administered Medications: None       Past Medical History:   Diagnosis Date   • Anxiety    • Asthma    • Depression    • Hypertension    • Migraine    • Rapid heart rate        Past Surgical History:   Procedure Laterality Date   • BARIATRIC SURGERY     • CHOLECYSTECTOMY     • GASTRIC BYPASS  2018    also had repair of perforated bowel afterwards   • HERNIA REPAIR     • HYSTERECTOMY     • LUNG SURGERY     • [place] REPAIR INCISIONAL HERNIA,REDUCIBLE N/A 2022    Procedure: OPEN INCISIONAL HERNIA REPAIR WITH MESH;  Surgeon: Liliane Aden MD;  Location: [facility];  Service: General   • UPPER GASTROINTESTINAL ENDOSCOPY         Family History   Problem Relation Age of Onset   • Cancer Mother         liver   • Diabetes Father    • Lung cancer Father    • No Known Problems Sister    • No Known Problems Sister    • No Known Problems Sister    • No Known Problems Daughter    • No Known Problems Maternal Grandmother    • No Known Problems Maternal Grandfather    • No Known Problems Paternal Grandmother    • No Known Problems Paternal Grandfather    • No Known Problems Son    • No Known Problems Maternal Aunt    • No Known Problems Maternal Aunt    • No Known Problems Paternal Aunt      I have reviewed and agree with the history as documented      E-Cigarette/Vaping   • E-Cigarette Use Never User      E-Cigarette/Vaping Substances   • Nicotine No    • THC No    • CBD No    • Flavoring No    • Other No    • Unknown No      Social History     Tobacco Use   • Smoking status: Former     Types: Cigarettes     Quit date:      Years since quittin 9   • Smokeless tobacco: Never   Vaping Use   • Vaping Use: Never used   Substance Use Topics   • Alcohol use: Not Currently     Comment: Occs   • Drug use: Not Currently     Types: Cocaine     Comment: last cocain use 1992       Review of Systems   Constitutional: Negative for chills and fever  HENT: Negative for drooling and trouble swallowing  Eyes: Negative for pain and visual disturbance  Respiratory: Negative for cough and shortness of breath  Cardiovascular: Negative for chest pain and palpitations  Gastrointestinal: Positive for abdominal distention, abdominal pain and constipation  Negative for nausea and vomiting  Genitourinary: Negative for decreased urine volume, difficulty urinating and dysuria  Musculoskeletal: Negative for gait problem and neck stiffness  Skin: Negative for color change and rash  Neurological: Negative for seizures, syncope and weakness  Psychiatric/Behavioral: Negative for agitation and confusion  All other systems reviewed and are negative  Physical Exam  Physical Exam  Vitals and nursing note reviewed  Constitutional:       General: She is not in acute distress  Appearance: She is well-developed  HENT:      Head: Normocephalic and atraumatic  Right Ear: External ear normal       Left Ear: External ear normal       Nose: Nose normal       Mouth/Throat:      Pharynx: Oropharynx is clear  Eyes:      General: No scleral icterus  Conjunctiva/sclera: Conjunctivae normal    Cardiovascular:      Rate and Rhythm: Normal rate and regular rhythm  Heart sounds: No murmur heard  Pulmonary:      Effort: Pulmonary effort is normal  No respiratory distress  Breath sounds: Normal breath sounds  Abdominal:      General: There is no distension  Palpations: Abdomen is soft  Tenderness: There is abdominal tenderness  Comments: mild tenderness to palpation diffusely   Musculoskeletal:         General: No swelling, deformity or signs of injury  Cervical back: Normal range of motion and neck supple     Skin:     General: Skin is warm and dry  Capillary Refill: Capillary refill takes less than 2 seconds  Neurological:      General: No focal deficit present  Mental Status: She is alert  Cranial Nerves: No cranial nerve deficit  Sensory: No sensory deficit  Psychiatric:         Mood and Affect: Mood normal          Thought Content:  Thought content normal          Vital Signs  ED Triage Vitals [12/02/22 0606]   Temperature Pulse Respirations Blood Pressure SpO2   97 5 °F (36 4 °C) 83 16 148/98 100 %      Temp src Heart Rate Source Patient Position - Orthostatic VS BP Location FiO2 (%)   -- -- -- -- --      Pain Score       --           Vitals:    12/02/22 0606   BP: 148/98   Pulse: 83         Visual Acuity      ED Medications  Medications   morphine injection 4 mg (has no administration in time range)   ondansetron (ZOFRAN) injection 4 mg (has no administration in time range)   sodium chloride 0 9 % bolus 1,000 mL (has no administration in time range)       Diagnostic Studies  Results Reviewed     Procedure Component Value Units Date/Time    COVID19, Influenza A/B, RSV PCR, [facility] [[id number]]     Lab Status: No result Specimen: Nares from Nose     CBC and differential [[id number]]  (Abnormal) Collected: 12/02/22 0630    Lab Status: Final result Specimen: Blood from Arm, Right Updated: 12/02/22 0635     WBC 5 70 Thousand/uL      RBC 5 31 Million/uL      Hemoglobin 12 6 g/dL      Hematocrit 40 6 %      MCV 77 fL      MCH 23 7 pg      MCHC 31 0 g/dL      RDW 15 6 %      MPV 9 4 fL      Platelets 594 Thousands/uL      nRBC 0 /100 WBCs      Neutrophils Relative 60 %      Immat GRANS % 1 %      Lymphocytes Relative 25 %      Monocytes Relative 8 %      Eosinophils Relative 5 %      Basophils Relative 1 %      Neutrophils Absolute 3 52 Thousands/µL      Immature Grans Absolute 0 03 Thousand/uL      Lymphocytes Absolute 1 40 Thousands/µL      Monocytes Absolute 0 43 Thousand/µL      Eosinophils Absolute 0 28 Thousand/µL Basophils Absolute 0 04 Thousands/µL     Comprehensive metabolic panel [[id number]] Collected: 12/02/22 0630    Lab Status: In process Specimen: Blood from Arm, Right Updated: 12/02/22 0633    Magnesium [[id number]] Collected: 12/02/22 0630    Lab Status: In process Specimen: Blood from Arm, Right Updated: 12/02/22 0633    Lipase [[id number]] Collected: 12/02/22 0630    Lab Status: In process Specimen: Blood from Arm, Right Updated: 12/02/22 0633    Lactic acid, plasma [[id number]]     Lab Status: No result Specimen: Blood     UA w Reflex to Microscopic w Reflex to Culture [[id number]]     Lab Status: No result Specimen: Urine                  CT abdomen pelvis with contrast    (Results Pending)              Procedures  Procedures         ED Course                                             MDM  Number of Diagnoses or Management Options  Generalized abdominal pain  Diagnosis management comments: Patient is a 81-year-old female seen in the emergency department with concern for abdominal pain  Differential diagnosis includes small-bowel obstruction, pancreatitis, appendicitis, cholecystitis, diverticulitis, gastroenteritis  Patient was treated with medication for symptom control  Laboratory studies were ordered  CT abdomen/pelvis was obtained to evaluate for bowel obstruction or other acute intra-abdominal abnormality  Patient is to be signed out to my colleague at change of shift, with studies pending         Amount and/or Complexity of Data Reviewed  Clinical lab tests: ordered  Tests in the radiology section of CPT®: ordered        Disposition  Final diagnoses:   Generalized abdominal pain     Time reflects when diagnosis was documented in both MDM as applicable and the Disposition within this note     Time User Action Codes Description Comment    12/2/2022  6:29 AM Bettyjane Ganser Add [R10 84] Generalized abdominal pain       ED Disposition     None      Follow-up Information    None         Patient's Medications Discharge Prescriptions    No medications on file       No discharge procedures on file      PDMP Review     None          ED Provider  Electronically Signed by           Felicia Fernandez MD  12/02/22 1160

## 2022-12-14 ENCOUNTER — TELEPHONE (OUTPATIENT)
Dept: NEUROLOGY | Facility: CLINIC | Age: 57
End: 2022-12-14

## 2022-12-14 NOTE — TELEPHONE ENCOUNTER
Received fax from [facility] NGUYỄN Lovett PA is about to    Key [id number]    Per enc 22-PA will  22    Will initiate closer to exp date

## 2022-12-29 ENCOUNTER — TELEPHONE (OUTPATIENT)
Dept: GASTROENTEROLOGY | Facility: AMBULARY SURGERY CENTER | Age: 57
End: 2022-12-29

## 2023-01-04 ENCOUNTER — TELEPHONE (OUTPATIENT)
Dept: GASTROENTEROLOGY | Facility: AMBULARY SURGERY CENTER | Age: 58
End: 2023-01-04

## 2023-01-04 NOTE — TELEPHONE ENCOUNTER
Pt returned my call re: scheduling EGD   Patient is scheduled for EGD on February 10 , 2023 at [address] [place] [address] with Tyrone Staples MD  Patient is aware of pre-procedure prep of Nothing to eat after midnight, day of the procedure clear liquids only and nothing to drink 4 hours prior to the EGD and they will be called the day prior between 2 and 6 pm for time to report for procedure  Pre-procedure prep has been given to the patient  via [address] [facility] mail on January 4, 2023

## 2023-01-04 NOTE — TELEPHONE ENCOUNTER
LVM for pt re: scheduling EGD w/ Dr Alfonzo Santacruz due FEB 2023/provided direct phone # in scheduling on pt's phone message/LETTER ALSO SENT

## 2023-01-14 ENCOUNTER — APPOINTMENT (EMERGENCY)
Dept: RADIOLOGY | Facility: HOSPITAL | Age: 58
End: 2023-01-14

## 2023-01-14 ENCOUNTER — HOSPITAL ENCOUNTER (EMERGENCY)
Facility: HOSPITAL | Age: 58
Discharge: HOME | End: 2023-01-14
Attending: EMERGENCY MEDICINE

## 2023-01-14 VITALS
HEIGHT: 62 IN | TEMPERATURE: 97.4 F | WEIGHT: 167 LBS | RESPIRATION RATE: 15 BRPM | OXYGEN SATURATION: 99 % | HEART RATE: 83 BPM | SYSTOLIC BLOOD PRESSURE: 122 MMHG | BODY MASS INDEX: 30.73 KG/M2 | DIASTOLIC BLOOD PRESSURE: 70 MMHG

## 2023-01-14 DIAGNOSIS — R07.9 CHEST PAIN, UNSPECIFIED TYPE: Primary | ICD-10-CM

## 2023-01-14 LAB
2HR DELTA HS TROPONIN: 1 NG/L
ANION GAP SERPL CALCULATED.3IONS-SCNC: 12 MMOL/L (ref 4–13)
BASOPHILS # BLD AUTO: 0.06 THOUSANDS/ÂΜL (ref 0–0.1)
BASOPHILS NFR BLD AUTO: 1 % (ref 0–1)
BUN SERPL-MCNC: 8 MG/DL (ref 5–25)
CALCIUM SERPL-MCNC: 9.1 MG/DL (ref 8.4–10.2)
CARDIAC TROPONIN I PNL SERPL HS: 3 NG/L
CARDIAC TROPONIN I PNL SERPL HS: 4 NG/L
CHLORIDE SERPL-SCNC: 103 MMOL/L (ref 96–108)
CO2 SERPL-SCNC: 23 MMOL/L (ref 21–32)
CREAT SERPL-MCNC: 0.89 MG/DL (ref 0.6–1.3)
EOSINOPHIL # BLD AUTO: 0.23 THOUSAND/ÂΜL (ref 0–0.61)
EOSINOPHIL NFR BLD AUTO: 3 % (ref 0–6)
ERYTHROCYTE [DISTWIDTH] IN BLOOD BY AUTOMATED COUNT: 15.9 % (ref 11.6–15.1)
GFR SERPL CREATININE-BSD FRML MDRD: 72 ML/MIN/1.73SQ M
GLUCOSE SERPL-MCNC: 82 MG/DL (ref 65–140)
HCT VFR BLD AUTO: 41.7 % (ref 34.8–46.1)
HGB BLD-MCNC: 13.1 G/DL (ref 11.5–15.4)
IMM GRANULOCYTES # BLD AUTO: 0.02 THOUSAND/UL (ref 0–0.2)
IMM GRANULOCYTES NFR BLD AUTO: 0 % (ref 0–2)
LYMPHOCYTES # BLD AUTO: 1.7 THOUSANDS/ÂΜL (ref 0.6–4.47)
LYMPHOCYTES NFR BLD AUTO: 25 % (ref 14–44)
MCH RBC QN AUTO: 23.8 PG (ref 26.8–34.3)
MCHC RBC AUTO-ENTMCNC: 31.4 G/DL (ref 31.4–37.4)
MCV RBC AUTO: 76 FL (ref 82–98)
MONOCYTES # BLD AUTO: 0.54 THOUSAND/ÂΜL (ref 0.17–1.22)
MONOCYTES NFR BLD AUTO: 8 % (ref 4–12)
NEUTROPHILS # BLD AUTO: 4.25 THOUSANDS/ÂΜL (ref 1.85–7.62)
NEUTS SEG NFR BLD AUTO: 63 % (ref 43–75)
NRBC BLD AUTO-RTO: 0 /100 WBCS
PLATELET # BLD AUTO: 376 THOUSANDS/UL (ref 149–390)
PMV BLD AUTO: 9.8 FL (ref 8.9–12.7)
POTASSIUM SERPL-SCNC: 4.1 MMOL/L (ref 3.5–5.3)
RBC # BLD AUTO: 5.5 MILLION/UL (ref 3.81–5.12)
SODIUM SERPL-SCNC: 138 MMOL/L (ref 135–147)
WBC # BLD AUTO: 6.8 THOUSAND/UL (ref 4.31–10.16)

## 2023-01-14 RX ORDER — ASPIRIN 325 MG
325 TABLET ORAL ONCE
Status: DISCONTINUED | OUTPATIENT
Start: 2023-01-14 | End: 2023-01-14 | Stop reason: HOSPADM

## 2023-01-14 RX ORDER — NITROGLYCERIN 0.4 MG/1
0.4 TABLET SUBLINGUAL ONCE
Status: COMPLETED | OUTPATIENT
Start: 2023-01-14 | End: 2023-01-14

## 2023-01-14 RX ADMIN — NITROGLYCERIN 0.4 MG: 0.4 TABLET SUBLINGUAL at 12:24

## 2023-01-14 NOTE — ED PROVIDER NOTES
History  Chief Complaint   Patient presents with   • Chest Pain     Arrived via [facility] with c/o chest pain that started when she woke from a nap approx 30 min ago  324mg ASA given by EMS PTA     80-year-old female, presents with chest pain  Patient states she was taking a nap, woke up about half hour prior to arrival with chest pain  Has moderate, dull pressure to center chest, constant with no modifying factors  Denies any difficulty breathing  No history of similar symptoms  Denies any history of cardiac disease  History provided by:  Patient   used: No    Chest Pain  Associated symptoms: no fever        Prior to Admission Medications   Prescriptions Last Dose Informant Patient Reported? Taking? B-D 3CC LUER-ARACELIS SYR 25GX1/2" 25G X 1-1/2" 3 ML MISC   No No   Sig: USE FOR TORADOL INTRAMUSCULAR INJECTIONS   FLUoxetine (PROzac) 40 MG capsule   Yes No   Sig: TAKE 2 CAPSULES BY MOUTH EVERY DAY IN THE MORNING   Syringe/Needle, Disp, (SYRINGE 3CC/48LW1-1/4") 27G X 1-1/4" 3 ML MISC   No No   Sig: Use for Toradol intramuscular injections     budesonide-formoterol (SYMBICORT) 160-4 5 mcg/act inhaler   Yes No   Sig: Inhale 2 puffs 2 (two) times a day Morning and evening   clonazePAM (KlonoPIN) 0 5 mg tablet   Yes No   Sig: Take 0 5 mg by mouth daily   cyproheptadine (PERIACTIN) 4 mg tablet   No No   Sig: TAKE 1 TABLET(4 MG) BY MOUTH DAILY AT BEDTIME AS NEEDED FOR ALLERGIES OR HEADACHE   docusate sodium (COLACE) 100 mg capsule   No No   Sig: Take 1 capsule (100 mg total) by mouth 2 (two) times a day for 3 days   lamoTRIgine (LaMICtal) 100 mg tablet   No No   Sig: TAKE 1/2 TABLET BY MOUTH IN THE MORNING AND 1 TABLET AT BEDTIME   omeprazole (PriLOSEC) 40 MG capsule   No No   Sig: Take 1 capsule (40 mg total) by mouth daily   sucralfate (CARAFATE) 1 g tablet   No No   Sig: Take 1 tablet (1 g total) by mouth 4 (four) times a day   sucralfate (CARAFATE) 1 g/10 mL suspension   No No   Sig: Take 10 mL (1 g total) by mouth 4 (four) times a day      Facility-Administered Medications: None       Past Medical History:   Diagnosis Date   • Anxiety    • Asthma    • Depression    • Hypertension    • Migraine    • Rapid heart rate        Past Surgical History:   Procedure Laterality Date   • BARIATRIC SURGERY     • CHOLECYSTECTOMY     • GASTRIC BYPASS  2018    also had repair of perforated bowel afterwards   • HERNIA REPAIR     • HYSTERECTOMY     • LUNG SURGERY     • [place] REPAIR FIRST ABDOMINAL WALL HERNIA N/A 2022    Procedure: OPEN INCISIONAL HERNIA REPAIR WITH MESH;  Surgeon: Penny Blanchard MD;  Location: [facility];  Service: General   • UPPER GASTROINTESTINAL ENDOSCOPY         Family History   Problem Relation Age of Onset   • Cancer Mother         liver   • Diabetes Father    • Lung cancer Father    • No Known Problems Sister    • No Known Problems Sister    • No Known Problems Sister    • No Known Problems Daughter    • No Known Problems Maternal Grandmother    • No Known Problems Maternal Grandfather    • No Known Problems Paternal Grandmother    • No Known Problems Paternal Grandfather    • No Known Problems Son    • No Known Problems Maternal Aunt    • No Known Problems Maternal Aunt    • No Known Problems Paternal Aunt      I have reviewed and agree with the history as documented  E-Cigarette/Vaping   • E-Cigarette Use Never User      E-Cigarette/Vaping Substances   • Nicotine No    • THC No    • CBD No    • Flavoring No    • Other No    • Unknown No      Social History     Tobacco Use   • Smoking status: Former     Types: Cigarettes     Quit date:      Years since quittin 0   • Smokeless tobacco: Never   Vaping Use   • Vaping Use: Never used   Substance Use Topics   • Alcohol use: Not Currently     Comment: Occs   • Drug use: Not Currently     Types: Cocaine     Comment: last cocain use        Review of Systems   Constitutional: Negative  Negative for fever  HENT: Negative      Eyes: Negative  Respiratory: Negative  Cardiovascular: Positive for chest pain  Gastrointestinal: Negative  Musculoskeletal: Negative  Neurological: Negative  Physical Exam  Physical Exam  Vitals and nursing note reviewed  Constitutional:       General: She is not in acute distress  HENT:      Head: Normocephalic and atraumatic  Mouth/Throat:      Mouth: Mucous membranes are moist       Pharynx: Oropharynx is clear  Eyes:      Extraocular Movements: Extraocular movements intact  Pupils: Pupils are equal, round, and reactive to light  Cardiovascular:      Rate and Rhythm: Normal rate and regular rhythm  Pulmonary:      Effort: Pulmonary effort is normal       Breath sounds: Normal breath sounds  Chest:          Comments: Tenderness to lower sternum, no swelling no deformity  Abdominal:      Palpations: Abdomen is soft  Tenderness: There is no abdominal tenderness  Musculoskeletal:         General: Normal range of motion  Skin:     General: Skin is warm and dry  Neurological:      General: No focal deficit present  Mental Status: She is alert and oriented to person, place, and time           Vital Signs  ED Triage Vitals   Temperature Pulse Respirations Blood Pressure SpO2   01/14/23 1158 01/14/23 1158 01/14/23 1158 01/14/23 1158 01/14/23 1158   (!) 97 4 °F (36 3 °C) 95 18 135/79 98 %      Temp src Heart Rate Source Patient Position - Orthostatic VS BP Location FiO2 (%)   -- 01/14/23 1328 01/14/23 1227 01/14/23 1227 --    Monitor Lying Left arm       Pain Score       01/14/23 1158       8           Vitals:    01/14/23 1256 01/14/23 1328 01/14/23 1410 01/14/23 1511   BP:  107/68 110/67 122/70   Pulse: 88 83 80 83   Patient Position - Orthostatic VS:  Sitting Sitting Lying         Visual Acuity      ED Medications  Medications   aspirin tablet 325 mg (325 mg Oral Not Given 1/14/23 1227)   nitroglycerin (NITROSTAT) SL tablet 0 4 mg (0 4 mg Sublingual Given 1/14/23 1224) Diagnostic Studies  Results Reviewed     Procedure Component Value Units Date/Time    HS Troponin I 2hr [[id number]]  (Normal) Collected: 01/14/23 1406    Lab Status: Final result Specimen: Blood from Arm, Right Updated: 01/14/23 1434     hs TnI 2hr 4 ng/L      Delta 2hr hsTnI 1 ng/L     Basic metabolic panel [[id number]] Collected: 01/14/23 1213    Lab Status: Final result Specimen: Blood from Foot, Left Updated: 01/14/23 1352     Sodium 138 mmol/L      Potassium 4 1 mmol/L      Chloride 103 mmol/L      CO2 23 mmol/L      ANION GAP 12 mmol/L      BUN 8 mg/dL      Creatinine 0 89 mg/dL      Glucose 82 mg/dL      Calcium 9 1 mg/dL      eGFR 72 ml/min/1 73sq m     Narrative:      [place] guidelines for Chronic Kidney Disease (CKD):   •  Stage 1 with normal or high GFR (GFR > 90 mL/min/1 73 square meters)  •  Stage 2 Mild CKD (GFR = 60-89 mL/min/1 73 square meters)  •  Stage 3A Moderate CKD (GFR = 45-59 mL/min/1 73 square meters)  •  Stage 3B Moderate CKD (GFR = 30-44 mL/min/1 73 square meters)  •  Stage 4 Severe CKD (GFR = 15-29 mL/min/1 73 square meters)  •  Stage 5 End Stage CKD (GFR <15 mL/min/1 73 square meters)  Note: GFR calculation is accurate only with a steady state creatinine    HS Troponin 0hr (reflex protocol) [[id number]]  (Normal) Collected: 01/14/23 1213    Lab Status: Final result Specimen: Blood from Arm, Right Updated: 01/14/23 1331     hs TnI 0hr 3 ng/L     CBC and differential [[id number]]  (Abnormal) Collected: 01/14/23 1213    Lab Status: Final result Specimen: Blood from Arm, Right Updated: 01/14/23 1314     WBC 6 80 Thousand/uL      RBC 5 50 Million/uL      Hemoglobin 13 1 g/dL      Hematocrit 41 7 %      MCV 76 fL      MCH 23 8 pg      MCHC 31 4 g/dL      RDW 15 9 %      MPV 9 8 fL      Platelets 072 Thousands/uL      nRBC 0 /100 WBCs      Neutrophils Relative 63 %      Immat GRANS % 0 %      Lymphocytes Relative 25 %      Monocytes Relative 8 %      Eosinophils Relative 3 %      Basophils Relative 1 %      Neutrophils Absolute 4 25 Thousands/µL      Immature Grans Absolute 0 02 Thousand/uL      Lymphocytes Absolute 1 70 Thousands/µL      Monocytes Absolute 0 54 Thousand/µL      Eosinophils Absolute 0 23 Thousand/µL      Basophils Absolute 0 06 Thousands/µL                  XR chest 1 view portable    (Results Pending)              Procedures  ECG 12 Lead Documentation Only    Date/Time: 1/14/2023 12:14 PM  Performed by: Roxanna Oates MD  Authorized by: Roxanna Oates MD     ECG reviewed by me, the ED Provider: yes    Patient location:  ED  Rate:     ECG rate assessment: normal    Rhythm:     Rhythm: sinus rhythm    Ectopy:     Ectopy: none    QRS:     QRS axis:  Normal    QRS intervals:  Normal  Conduction:     Conduction: normal    ST segments:     ST segments:  Normal  Comments:      Sinus rhythm at 94, no acute changes             ED Course  ED Course as of 01/14/23 1516   Sat Jan 14, 2023   1343 Chest x-ray independently reviewed by myself, no infiltrate or effusion, no acute findings  1508 Patient comfortable, has no current symptoms  Has been normal sinus rhythm on cardiac monitor since arrival   Initial troponin and 2-hour repeat with no significant elevation  Patient feels well and would like to be discharged home  Patient low risk for major adverse cardiac event in next 6 weeks by low heart score  Discussed with patient follow-up with primary care doctor for further evaluation, return to emergency department for any worsening or new concerning symptoms               HEART Risk Score    Flowsheet Row Most Recent Value   Heart Score Risk Calculator    History 0 Filed at: 01/14/2023 1507   ECG 0 Filed at: 01/14/2023 1507   Age 1 Filed at: 01/14/2023 1507   Risk Factors 1 Filed at: 01/14/2023 1507   Troponin 0 Filed at: 01/14/2023 1507   HEART Score 2 Filed at: 01/14/2023 1507                        SBIRT 20yo+    Flowsheet Row Most Recent Value   SBIRT (21 yo +)    In order to provide better care to our patients, we are screening all of our patients for alcohol and drug use  Would it be okay to ask you these screening questions? No Filed at: 01/14/2023 1210                    Medical Decision Making  60-year-old female, presenting with chest pain  Differential diagnosis includes ACS, GERD, muscle pain among other diagnoses  Patient appears comfortable and in no distress, normal sinus rhythm on cardiac monitor  EKG, chest x-ray, labs ordered  We will continue to monitor in ED and reevaluate  I have reviewed test results and diagnosis with patient  Follow-up plan reviewed  Precautions for acute return for re-evaluation are reviewed  Opportunity to ask questions was provided  Patient verbalizes understanding  Chest pain, unspecified type: acute illness or injury  Amount and/or Complexity of Data Reviewed  External Data Reviewed: notes  Labs: ordered  Decision-making details documented in ED Course  Radiology: ordered and independent interpretation performed  Decision-making details documented in ED Course  ECG/medicine tests: ordered and independent interpretation performed  Decision-making details documented in ED Course  Risk  OTC drugs  Prescription drug management  Disposition  Final diagnoses:   Chest pain, unspecified type     Time reflects when diagnosis was documented in both MDM as applicable and the Disposition within this note     Time User Action Codes Description Comment    1/14/2023  3:16 PM [place] Bert Add [R07 9] Chest pain, unspecified type       ED Disposition     ED Disposition   Discharge    Condition   Stable    Date/Time   Sat Jan 14, 2023  3:16 PM    [address] [facility] discharge to home/self care                 Follow-up Information     Follow up With Specialties Details Why Contact Info    Ramsey Carmen MD Family Medicine Schedule an appointment as soon as possible for a visit   [address] [facility]  [phone]            Patient's Medications   Discharge Prescriptions    No medications on file       No discharge procedures on file      PDMP Review       Value Time User    PDMP Reviewed  Yes 12/2/2022  2:04 PM Rita Gibson PA-C          ED Provider  Electronically Signed by           Laci Fleming MD  01/14/23 [address] [facility] Geovanny Lopez MD  01/14/23 5615

## 2023-01-15 LAB
ATRIAL RATE: 94 BPM
P AXIS: 71 DEGREES
PR INTERVAL: 144 MS
QRS AXIS: 40 DEGREES
QRSD INTERVAL: 76 MS
QT INTERVAL: 388 MS
QTC INTERVAL: 485 MS
T WAVE AXIS: 63 DEGREES
VENTRICULAR RATE: 94 BPM

## 2023-02-09 RX ORDER — SODIUM CHLORIDE, SODIUM LACTATE, POTASSIUM CHLORIDE, CALCIUM CHLORIDE 600; 310; 30; 20 MG/100ML; MG/100ML; MG/100ML; MG/100ML
125 INJECTION, SOLUTION INTRAVENOUS CONTINUOUS
Status: CANCELLED | OUTPATIENT
Start: 2023-02-09

## 2023-02-10 ENCOUNTER — HOSPITAL ENCOUNTER (OUTPATIENT)
Dept: GASTROENTEROLOGY | Facility: AMBULARY SURGERY CENTER | Age: 58
Setting detail: OUTPATIENT SURGERY
Discharge: HOME | End: 2023-02-10
Attending: INTERNAL MEDICINE | Admitting: INTERNAL MEDICINE

## 2023-02-10 ENCOUNTER — ANESTHESIA EVENT (OUTPATIENT)
Dept: GASTROENTEROLOGY | Facility: AMBULARY SURGERY CENTER | Age: 58
End: 2023-02-10

## 2023-02-10 ENCOUNTER — ANESTHESIA (OUTPATIENT)
Dept: GASTROENTEROLOGY | Facility: AMBULARY SURGERY CENTER | Age: 58
End: 2023-02-10

## 2023-02-10 VITALS
OXYGEN SATURATION: 100 % | TEMPERATURE: 96.8 F | RESPIRATION RATE: 16 BRPM | SYSTOLIC BLOOD PRESSURE: 128 MMHG | DIASTOLIC BLOOD PRESSURE: 65 MMHG | HEART RATE: 76 BPM

## 2023-02-10 DIAGNOSIS — K28.9 ANASTOMOTIC ULCER: ICD-10-CM

## 2023-02-10 PROBLEM — K21.9 GASTROESOPHAGEAL REFLUX DISEASE: Status: ACTIVE | Noted: 2023-02-10

## 2023-02-10 PROBLEM — Z98.84 H/O BARIATRIC SURGERY: Status: ACTIVE | Noted: 2023-02-10

## 2023-02-10 RX ORDER — SUCRALFATE ORAL 1 G/10ML
1 SUSPENSION ORAL 4 TIMES DAILY
Qty: 420 ML | Refills: 3 | Status: SHIPPED | OUTPATIENT
Start: 2023-02-10 | End: 2023-02-15

## 2023-02-10 RX ORDER — SODIUM CHLORIDE, SODIUM LACTATE, POTASSIUM CHLORIDE, CALCIUM CHLORIDE 600; 310; 30; 20 MG/100ML; MG/100ML; MG/100ML; MG/100ML
INJECTION, SOLUTION INTRAVENOUS CONTINUOUS PRN
Status: DISCONTINUED | OUTPATIENT
Start: 2023-02-10 | End: 2023-02-10

## 2023-02-10 RX ORDER — SODIUM CHLORIDE, SODIUM LACTATE, POTASSIUM CHLORIDE, CALCIUM CHLORIDE 600; 310; 30; 20 MG/100ML; MG/100ML; MG/100ML; MG/100ML
125 INJECTION, SOLUTION INTRAVENOUS CONTINUOUS
Status: DISCONTINUED | OUTPATIENT
Start: 2023-02-10 | End: 2023-02-14 | Stop reason: HOSPADM

## 2023-02-10 RX ORDER — LIDOCAINE HYDROCHLORIDE 10 MG/ML
INJECTION, SOLUTION EPIDURAL; INFILTRATION; INTRACAUDAL; PERINEURAL AS NEEDED
Status: DISCONTINUED | OUTPATIENT
Start: 2023-02-10 | End: 2023-02-10

## 2023-02-10 RX ORDER — PANTOPRAZOLE SODIUM 40 MG/1
40 TABLET, DELAYED RELEASE ORAL 2 TIMES DAILY
Qty: 60 TABLET | Refills: 11 | Status: SHIPPED | OUTPATIENT
Start: 2023-02-10 | End: 2023-02-17 | Stop reason: SDUPTHER

## 2023-02-10 RX ORDER — PROPOFOL 10 MG/ML
INJECTION, EMULSION INTRAVENOUS AS NEEDED
Status: DISCONTINUED | OUTPATIENT
Start: 2023-02-10 | End: 2023-02-10

## 2023-02-10 RX ADMIN — SODIUM CHLORIDE, SODIUM LACTATE, POTASSIUM CHLORIDE, AND CALCIUM CHLORIDE 125 ML/HR: .6; .31; .03; .02 INJECTION, SOLUTION INTRAVENOUS at 10:42

## 2023-02-10 RX ADMIN — PROPOFOL 100 MG: 10 INJECTION, EMULSION INTRAVENOUS at 11:36

## 2023-02-10 RX ADMIN — LIDOCAINE HYDROCHLORIDE 50 MG: 10 INJECTION, SOLUTION EPIDURAL; INFILTRATION; INTRACAUDAL at 11:36

## 2023-02-10 RX ADMIN — SODIUM CHLORIDE, SODIUM LACTATE, POTASSIUM CHLORIDE, AND CALCIUM CHLORIDE: .6; .31; .03; .02 INJECTION, SOLUTION INTRAVENOUS at 11:30

## 2023-02-10 NOTE — H&P
History and Physical - [facility]  Hebert Yost 65 y o  female MRN: [id number]        HPI: 52-year-old female with history of anxiety, depression, hypertension was noted to have anastomotic ulcer on the previous upper endoscopy few months ago      Historical Information   Past Medical History:   Diagnosis Date   • Anxiety    • Asthma    • Depression    • Hypertension    • Migraine    • Rapid heart rate      Past Surgical History:   Procedure Laterality Date   • BARIATRIC SURGERY     • CHOLECYSTECTOMY     • GASTRIC BYPASS  2018    also had repair of perforated bowel afterwards   • HERNIA REPAIR     • HYSTERECTOMY     • LUNG SURGERY     • DC REPAIR FIRST ABDOMINAL WALL HERNIA N/A 2022    Procedure: OPEN INCISIONAL HERNIA REPAIR WITH MESH;  Surgeon: Suzette Tan MD;  Location: [facility];  Service: General   • UPPER GASTROINTESTINAL ENDOSCOPY       Social History   Social History     Substance and Sexual Activity   Alcohol Use Yes    Comment: Occ socially     Social History     Substance and Sexual Activity   Drug Use Not Currently   • Types: Cocaine    Comment: last cocain use      Social History     Tobacco Use   Smoking Status Former   • Types: Cigarettes   • Quit date:    • Years since quittin 1   Smokeless Tobacco Never     Family History   Problem Relation Age of Onset   • Cancer Mother         liver   • Diabetes Father    • Lung cancer Father    • No Known Problems Sister    • No Known Problems Sister    • No Known Problems Sister    • No Known Problems Daughter    • No Known Problems Maternal Grandmother    • No Known Problems Maternal Grandfather    • No Known Problems Paternal Grandmother    • No Known Problems Paternal Grandfather    • No Known Problems Son    • No Known Problems Maternal Aunt    • No Known Problems Maternal Aunt    • No Known Problems Paternal Aunt        Meds/Allergies     (Not in a hospital admission)      Allergies   Allergen Reactions   • Penicillins Shortness Of Breath   • Latex Hives   • Other Other (See Comments)      STEROIDS due to Bypass       Objective     Blood pressure 125/81, pulse 86, temperature (!) 96 8 °F (36 °C), temperature source Temporal, resp  rate 16, SpO2 97 %      PHYSICAL EXAM:    Gen: NAD  CV: S1 & S2 normal, RRR  CHEST: Clear to auscultate  ABD: soft, NT/ND, good bowel sounds  EXT: no edema    ASSESSMENT:     History of anastomotic ulcer    PLAN:    EGD
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
## 2023-02-10 NOTE — ANESTHESIA POSTPROCEDURE EVALUATION
Post-Op Assessment Note    CV Status:  Stable    Pain management: adequate     Mental Status:  Sleepy   Hydration Status:  Stable   PONV Controlled:  None   Airway Patency:  Patent   Two or more mitigation strategies used for obstructive sleep apnea   Post Op Vitals Reviewed: Yes      Staff: CRNA         No notable events documented      BP   145/95   Temp     Pulse 95   Resp 16   SpO2 99

## 2023-02-10 NOTE — ANESTHESIA PREPROCEDURE EVALUATION
Procedure:  EGD    Relevant Problems   ANESTHESIA (within normal limits)   (-) History of anesthesia complications      CARDIO   (+) Chronic migraine without aura   (+) HTN (hypertension)      ENDO (within normal limits)      GI/HEPATIC   (+) Gastroesophageal reflux disease   (+) H/O bariatric surgery      /RENAL (within normal limits)      GYN   (+) History of hysterectomy      HEMATOLOGY (within normal limits)      MUSCULOSKELETAL (within normal limits)      NEURO/PSYCH   (+) Anxiety   (+) Chronic migraine without aura   (+) History of colon polyps      PULMONARY   (+) Mild asthma      Nervous and Auditory   (+) Peripheral neuropathy        Physical Exam    Airway    Mallampati score: II  TM Distance: >3 FB  Neck ROM: full     Dental   lower dentures and upper dentures,     Cardiovascular  Rhythm: regular, Rate: normal, Cardiovascular exam normal    Pulmonary  Pulmonary exam normal Breath sounds clear to auscultation,     Other Findings        Anesthesia Plan  ASA Score- 2     Anesthesia Type- IV sedation with anesthesia with ASA Monitors  Additional Monitors:   Airway Plan:           Plan Factors-Exercise tolerance (METS): >4 METS  Chart reviewed  EKG reviewed  Imaging results reviewed  Existing labs reviewed  Patient summary reviewed  Patient is not a current smoker  Patient did not smoke on day of surgery  Induction- intravenous  Postoperative Plan-     Informed Consent- Anesthetic plan and risks discussed with patient  I personally reviewed this patient with the CRNA  Discussed and agreed on the Anesthesia Plan with the CRNA           NPO and allergies verified  Patient took clonazepam, Symbicort, and Prozac this morning, 2/10/23  Plan:  IV sedation/MAC, GA as backup    Benefits and risks of sedation were discussed with the patient including possibility of recall under sedation and the potential for conversion to general anesthesia if necessary  All questions were answered  Anesthesia consent was obtained from the patient

## 2023-02-11 NOTE — PROGRESS NOTES
ASSESSMENT/PLAN:  RAPID STREP =POSITIVE  RAPID FLUA/B=NEG  COVID =NEG  RSV=NEG    Acute cough  - POCT COVID/FLU/RSV PANEL    - predniSONE (DELTASONE) 20 MG tablet; Take 2 tablets by mouth daily for 5 days.  Dispense: 10 tablet; Refill: 0  - albuterol 108 (90 Base) MCG/ACT inhaler; Inhale 2 puffs into the lungs every 4 hours as needed for Shortness of Breath or Wheezing.  Dispense: 1 each; Refill: 0  - amoxicillin (AMOXIL) 500 MG capsule; Take 1 capsule by mouth in the morning and 1 capsule at noon and 1 capsule in the evening.  Dispense: 30 capsule; Refill: 0    Sore throat  - POCT STREPTOCOCCUS GROUP A PCR  POSITIVE    Warm salt water gargles    Tylenol for fever pain    FOLLOW UP WITH PRIMARY DOC IN TWO DAYS  GO TO ED FOR CHEST PAIN SHORTNESS OF BREATH OR DIZZINESS.      Diagnosis and prognosis, treatment and side-effects were explained and all questions were answered satisfactorily and there were no further questions upon discharge   Review of Systems   Constitutional: Negative  HENT: Negative  Eyes: Positive for photophobia  Respiratory: Negative  Cardiovascular: Negative  Gastrointestinal: Positive for nausea  Endocrine: Negative  Genitourinary: Negative  Musculoskeletal: Negative  Skin: Negative  Allergic/Immunologic: Negative  Neurological: Positive for headaches  Hematological: Negative  Psychiatric/Behavioral: Negative

## 2023-02-15 ENCOUNTER — TELEPHONE (OUTPATIENT)
Dept: GASTROENTEROLOGY | Facility: CLINIC | Age: 58
End: 2023-02-15

## 2023-02-15 DIAGNOSIS — K28.9 ANASTOMOTIC ULCER: ICD-10-CM

## 2023-02-15 DIAGNOSIS — K28.9 ANASTOMOTIC ULCER: Primary | ICD-10-CM

## 2023-02-15 RX ORDER — SUCRALFATE 1 G/1
TABLET ORAL
Qty: 360 TABLET | Refills: 0 | Status: SHIPPED | OUTPATIENT
Start: 2023-02-15

## 2023-02-15 RX ORDER — SUCRALFATE 1 G/1
1 TABLET ORAL 4 TIMES DAILY
Qty: 120 TABLET | Refills: 3 | Status: SHIPPED | OUTPATIENT
Start: 2023-02-15 | End: 2023-02-15

## 2023-02-15 NOTE — TELEPHONE ENCOUNTER
LM advising pt of tablets being called in and if to expensive Gaviscon OTC 4 x day  Advised to call with any questions

## 2023-02-15 NOTE — TELEPHONE ENCOUNTER
The liquid was ordered which tends not to be covered  I tried sending the tablets instead   If this is still too expensive, then the alternative would be gaviscon tablets or liquid over the counter 4 times a day

## 2023-02-17 ENCOUNTER — TELEPHONE (OUTPATIENT)
Dept: GASTROENTEROLOGY | Facility: AMBULARY SURGERY CENTER | Age: 58
End: 2023-02-17

## 2023-02-17 DIAGNOSIS — K28.9 ANASTOMOTIC ULCER: ICD-10-CM

## 2023-02-17 RX ORDER — PANTOPRAZOLE SODIUM 40 MG/1
40 TABLET, DELAYED RELEASE ORAL 2 TIMES DAILY
Qty: 60 TABLET | Refills: 3 | Status: SHIPPED | OUTPATIENT
Start: 2023-02-17

## 2023-02-27 ENCOUNTER — APPOINTMENT (OUTPATIENT)
Dept: LAB | Facility: HOSPITAL | Age: 58
End: 2023-02-27
Attending: FAMILY MEDICINE

## 2023-02-27 DIAGNOSIS — E61.1 IRON DEFICIENCY: ICD-10-CM

## 2023-02-27 DIAGNOSIS — R73.01 IMPAIRED FASTING GLUCOSE: ICD-10-CM

## 2023-02-27 DIAGNOSIS — I10 ESSENTIAL HYPERTENSION, BENIGN: ICD-10-CM

## 2023-02-27 DIAGNOSIS — E78.5 HYPERLIPIDEMIA, UNSPECIFIED HYPERLIPIDEMIA TYPE: ICD-10-CM

## 2023-02-27 LAB
ALBUMIN SERPL BCP-MCNC: 4 G/DL (ref 3.5–5)
ALP SERPL-CCNC: 94 U/L (ref 34–104)
ALT SERPL W P-5'-P-CCNC: 21 U/L (ref 7–52)
ANION GAP SERPL CALCULATED.3IONS-SCNC: 11 MMOL/L (ref 4–13)
AST SERPL W P-5'-P-CCNC: 17 U/L (ref 13–39)
BASOPHILS # BLD AUTO: 0.05 THOUSANDS/ÂΜL (ref 0–0.1)
BASOPHILS NFR BLD AUTO: 1 % (ref 0–1)
BILIRUB SERPL-MCNC: 0.41 MG/DL (ref 0.2–1)
BUN SERPL-MCNC: 9 MG/DL (ref 5–25)
CALCIUM SERPL-MCNC: 8.9 MG/DL (ref 8.4–10.2)
CHLORIDE SERPL-SCNC: 105 MMOL/L (ref 96–108)
CHOLEST SERPL-MCNC: 173 MG/DL
CO2 SERPL-SCNC: 24 MMOL/L (ref 21–32)
CREAT SERPL-MCNC: 0.95 MG/DL (ref 0.6–1.3)
EOSINOPHIL # BLD AUTO: 0.32 THOUSAND/ÂΜL (ref 0–0.61)
EOSINOPHIL NFR BLD AUTO: 5 % (ref 0–6)
ERYTHROCYTE [DISTWIDTH] IN BLOOD BY AUTOMATED COUNT: 16.4 % (ref 11.6–15.1)
EST. AVERAGE GLUCOSE BLD GHB EST-MCNC: 105 MG/DL
FERRITIN SERPL-MCNC: 7 NG/ML (ref 8–388)
GFR SERPL CREATININE-BSD FRML MDRD: 67 ML/MIN/1.73SQ M
GLUCOSE P FAST SERPL-MCNC: 106 MG/DL (ref 65–99)
HBA1C MFR BLD: 5.3 %
HCT VFR BLD AUTO: 43.2 % (ref 34.8–46.1)
HDLC SERPL-MCNC: 61 MG/DL
HGB BLD-MCNC: 13.2 G/DL (ref 11.5–15.4)
IMM GRANULOCYTES # BLD AUTO: 0.02 THOUSAND/UL (ref 0–0.2)
IMM GRANULOCYTES NFR BLD AUTO: 0 % (ref 0–2)
IRON SATN MFR SERPL: 9 % (ref 15–50)
IRON SERPL-MCNC: 42 UG/DL (ref 50–170)
LDLC SERPL CALC-MCNC: 94 MG/DL (ref 0–100)
LYMPHOCYTES # BLD AUTO: 2.03 THOUSANDS/ÂΜL (ref 0.6–4.47)
LYMPHOCYTES NFR BLD AUTO: 31 % (ref 14–44)
MCH RBC QN AUTO: 23.8 PG (ref 26.8–34.3)
MCHC RBC AUTO-ENTMCNC: 30.6 G/DL (ref 31.4–37.4)
MCV RBC AUTO: 78 FL (ref 82–98)
MONOCYTES # BLD AUTO: 0.36 THOUSAND/ÂΜL (ref 0.17–1.22)
MONOCYTES NFR BLD AUTO: 6 % (ref 4–12)
NEUTROPHILS # BLD AUTO: 3.74 THOUSANDS/ÂΜL (ref 1.85–7.62)
NEUTS SEG NFR BLD AUTO: 57 % (ref 43–75)
NONHDLC SERPL-MCNC: 112 MG/DL
NRBC BLD AUTO-RTO: 0 /100 WBCS
PLATELET # BLD AUTO: 378 THOUSANDS/UL (ref 149–390)
PMV BLD AUTO: 9.5 FL (ref 8.9–12.7)
POTASSIUM SERPL-SCNC: 3.8 MMOL/L (ref 3.5–5.3)
PROT SERPL-MCNC: 6.5 G/DL (ref 6.4–8.4)
RBC # BLD AUTO: 5.54 MILLION/UL (ref 3.81–5.12)
SODIUM SERPL-SCNC: 140 MMOL/L (ref 135–147)
TIBC SERPL-MCNC: 451 UG/DL (ref 250–450)
TRIGL SERPL-MCNC: 89 MG/DL
TSH SERPL DL<=0.05 MIU/L-ACNC: 2.75 UIU/ML (ref 0.45–4.5)
WBC # BLD AUTO: 6.52 THOUSAND/UL (ref 4.31–10.16)

## 2023-03-11 ENCOUNTER — TELEPHONE (OUTPATIENT)
Dept: GASTROENTEROLOGY | Facility: AMBULARY SURGERY CENTER | Age: 58
End: 2023-03-11

## 2023-03-15 ENCOUNTER — TELEPHONE (OUTPATIENT)
Dept: GASTROENTEROLOGY | Facility: AMBULARY SURGERY CENTER | Age: 58
End: 2023-03-15

## 2023-03-24 ENCOUNTER — TELEPHONE (OUTPATIENT)
Dept: GASTROENTEROLOGY | Facility: AMBULARY SURGERY CENTER | Age: 58
End: 2023-03-24

## 2023-03-24 NOTE — TELEPHONE ENCOUNTER
LVM for pt to return call re: scheduling repeat EGD /LETTER ALSO SENT TO PT Acitretin Pregnancy And Lactation Text: This medication is Pregnancy Category X and should not be given to women who are pregnant or may become pregnant in the future. This medication is excreted in breast milk.

## 2023-03-27 ENCOUNTER — TELEPHONE (OUTPATIENT)
Dept: GASTROENTEROLOGY | Facility: AMBULARY SURGERY CENTER | Age: 58
End: 2023-03-27

## 2023-03-27 NOTE — TELEPHONE ENCOUNTER
Spoke w/ pt   Patient is scheduled for EGD on May 23, 2023 at [address] [place] [address] with Larry Flores MD  Patient is aware of pre-procedure prep of Nothing to eat after midnight, day of the procedure clear liquids only and nothing to drink 4 hours prior to the EGD and they will be called the day prior between 2 and 6 pm for time to report for procedure   Pre-procedure prep has been given to the patient  via [id number] [facility] mail on 3/27/2023

## 2023-03-27 NOTE — TELEPHONE ENCOUNTER
Pt returning call to schedule EGD  Unsure of when to schedule EGD as not indicated in chart  Please reach out to pt to schedule  Pt can be reached at [phone]

## 2023-05-05 ENCOUNTER — HOSPITAL ENCOUNTER (EMERGENCY)
Facility: HOSPITAL | Age: 58
Discharge: HOME | End: 2023-05-05
Attending: EMERGENCY MEDICINE

## 2023-05-05 ENCOUNTER — APPOINTMENT (EMERGENCY)
Dept: CT IMAGING | Facility: HOSPITAL | Age: 58
End: 2023-05-05

## 2023-05-05 VITALS
HEART RATE: 77 BPM | SYSTOLIC BLOOD PRESSURE: 130 MMHG | OXYGEN SATURATION: 100 % | TEMPERATURE: 97.8 F | DIASTOLIC BLOOD PRESSURE: 74 MMHG | RESPIRATION RATE: 16 BRPM

## 2023-05-05 DIAGNOSIS — K59.00 CONSTIPATION, UNSPECIFIED CONSTIPATION TYPE: Primary | ICD-10-CM

## 2023-05-05 DIAGNOSIS — N39.0 UTI (URINARY TRACT INFECTION): ICD-10-CM

## 2023-05-05 LAB
ALBUMIN SERPL BCP-MCNC: 4 G/DL (ref 3.5–5)
ALP SERPL-CCNC: 96 U/L (ref 34–104)
ALT SERPL W P-5'-P-CCNC: 16 U/L (ref 7–52)
ANION GAP SERPL CALCULATED.3IONS-SCNC: 9 MMOL/L (ref 4–13)
AST SERPL W P-5'-P-CCNC: 16 U/L (ref 13–39)
BACTERIA UR QL AUTO: ABNORMAL /HPF
BASOPHILS # BLD AUTO: 0.05 THOUSANDS/ÂΜL (ref 0–0.1)
BASOPHILS NFR BLD AUTO: 1 % (ref 0–1)
BILIRUB SERPL-MCNC: 0.32 MG/DL (ref 0.2–1)
BILIRUB UR QL STRIP: NEGATIVE
BUN SERPL-MCNC: 8 MG/DL (ref 5–25)
CALCIUM SERPL-MCNC: 8.7 MG/DL (ref 8.4–10.2)
CHLORIDE SERPL-SCNC: 107 MMOL/L (ref 96–108)
CLARITY UR: CLEAR
CO2 SERPL-SCNC: 23 MMOL/L (ref 21–32)
COLOR UR: YELLOW
CREAT SERPL-MCNC: 0.83 MG/DL (ref 0.6–1.3)
EOSINOPHIL # BLD AUTO: 0.38 THOUSAND/ÂΜL (ref 0–0.61)
EOSINOPHIL NFR BLD AUTO: 6 % (ref 0–6)
ERYTHROCYTE [DISTWIDTH] IN BLOOD BY AUTOMATED COUNT: 15.7 % (ref 11.6–15.1)
GFR SERPL CREATININE-BSD FRML MDRD: 79 ML/MIN/1.73SQ M
GLUCOSE SERPL-MCNC: 97 MG/DL (ref 65–140)
GLUCOSE UR STRIP-MCNC: NEGATIVE MG/DL
HCT VFR BLD AUTO: 40.4 % (ref 34.8–46.1)
HEMOCCULT STL QL IA: NEGATIVE
HGB BLD-MCNC: 12.7 G/DL (ref 11.5–15.4)
HGB UR QL STRIP.AUTO: NEGATIVE
IMM GRANULOCYTES # BLD AUTO: 0.02 THOUSAND/UL (ref 0–0.2)
IMM GRANULOCYTES NFR BLD AUTO: 0 % (ref 0–2)
KETONES UR STRIP-MCNC: NEGATIVE MG/DL
LACTATE SERPL-SCNC: 1.2 MMOL/L (ref 0.5–2)
LEUKOCYTE ESTERASE UR QL STRIP: ABNORMAL
LIPASE SERPL-CCNC: 32 U/L (ref 11–82)
LYMPHOCYTES # BLD AUTO: 1.69 THOUSANDS/ÂΜL (ref 0.6–4.47)
LYMPHOCYTES NFR BLD AUTO: 25 % (ref 14–44)
MCH RBC QN AUTO: 24.5 PG (ref 26.8–34.3)
MCHC RBC AUTO-ENTMCNC: 31.4 G/DL (ref 31.4–37.4)
MCV RBC AUTO: 78 FL (ref 82–98)
MONOCYTES # BLD AUTO: 0.42 THOUSAND/ÂΜL (ref 0.17–1.22)
MONOCYTES NFR BLD AUTO: 6 % (ref 4–12)
NEUTROPHILS # BLD AUTO: 4.13 THOUSANDS/ÂΜL (ref 1.85–7.62)
NEUTS SEG NFR BLD AUTO: 62 % (ref 43–75)
NITRITE UR QL STRIP: POSITIVE
NON-SQ EPI CELLS URNS QL MICRO: ABNORMAL /HPF
NRBC BLD AUTO-RTO: 0 /100 WBCS
PH UR STRIP.AUTO: 7 [PH]
PLATELET # BLD AUTO: 311 THOUSANDS/UL (ref 149–390)
PMV BLD AUTO: 9.7 FL (ref 8.9–12.7)
POTASSIUM SERPL-SCNC: 4 MMOL/L (ref 3.5–5.3)
PROT SERPL-MCNC: 6.5 G/DL (ref 6.4–8.4)
PROT UR STRIP-MCNC: NEGATIVE MG/DL
RBC # BLD AUTO: 5.19 MILLION/UL (ref 3.81–5.12)
RBC #/AREA URNS AUTO: ABNORMAL /HPF
SODIUM SERPL-SCNC: 139 MMOL/L (ref 135–147)
SP GR UR STRIP.AUTO: <=1.005 (ref 1–1.03)
UROBILINOGEN UR QL STRIP.AUTO: 0.2 E.U./DL
WBC # BLD AUTO: 6.69 THOUSAND/UL (ref 4.31–10.16)
WBC #/AREA URNS AUTO: ABNORMAL /HPF

## 2023-05-05 RX ORDER — CEPHALEXIN 250 MG/1
500 CAPSULE ORAL ONCE
Status: COMPLETED | OUTPATIENT
Start: 2023-05-05 | End: 2023-05-05

## 2023-05-05 RX ORDER — CEPHALEXIN 500 MG/1
500 CAPSULE ORAL 3 TIMES DAILY
Qty: 21 CAPSULE | Refills: 0 | Status: SHIPPED | OUTPATIENT
Start: 2023-05-05 | End: 2023-05-12

## 2023-05-05 RX ADMIN — IOHEXOL 50 ML: 240 INJECTION, SOLUTION INTRATHECAL; INTRAVASCULAR; INTRAVENOUS; ORAL at 16:31

## 2023-05-05 RX ADMIN — CEPHALEXIN 500 MG: 250 CAPSULE ORAL at 17:59

## 2023-05-05 RX ADMIN — IOHEXOL 100 ML: 350 INJECTION, SOLUTION INTRAVENOUS at 16:32

## 2023-05-05 RX ADMIN — SODIUM CHLORIDE 1000 ML: 0.9 INJECTION, SOLUTION INTRAVENOUS at 14:27

## 2023-05-05 NOTE — ED PROVIDER NOTES
"History  Chief Complaint   Patient presents with    Black or Bloody Stool     Pt presents to ED from home w/ black stool this morning after 5 days constipation  Past Medical History: Anxiety, Asthma, Depression, HTN, Migraine, Rapid heart rate  Past Surgical History: CHOLECYSTECTOMY, GASTRIC BYPASS -2018; also had repair of perforated bowel afterwards-[facility], HERNIA REPAIR, HYSTERECTOMY, LUNG SURGERY, REPAIR FIRST ABDOMINAL WALL HERNIA, OPEN INCISIONAL HERNIA REPAIR WITH MESH, UPPER GASTROINTESTINAL ENDOSCOPY       Pt presents to ED from home c/o 5 days of constipation, diffuse abd pain/pressure, this am was able to have BM and states it was black in color; sees GI for marginal ulcer, is taking meds, is scheduled for repeat EGD 5/23  Pt denies HA, weakness, dizziness, cp, sob, nausea, vomiting, no urinary complaints, no abnormal vag bleeding/dc  Pt states has low iron and is supposed to take iron, but states that makes her more constipated          Prior to Admission Medications   Prescriptions Last Dose Informant Patient Reported? Taking? B-D 3CC LUER-ARACELIS SYR 25GX1/2\" 25G X 1-1/2\" 3 ML MISC   No No   Sig: USE FOR TORADOL INTRAMUSCULAR INJECTIONS   FLUoxetine (PROzac) 40 MG capsule   Yes No   Sig: TAKE 2 CAPSULES BY MOUTH EVERY DAY IN THE MORNING   Syringe/Needle, Disp, (SYRINGE 3CC/38BM9-8/4\") 27G X 1-1/4\" 3 ML MISC   No No   Sig: Use for Toradol intramuscular injections     budesonide-formoterol (SYMBICORT) 160-4 5 mcg/act inhaler   Yes No   Sig: Inhale 2 puffs 2 (two) times a day Morning and evening   clonazePAM (KlonoPIN) 0 5 mg tablet   Yes No   Sig: Take 0 5 mg by mouth daily   cyproheptadine (PERIACTIN) 4 mg tablet   No No   Sig: TAKE 1 TABLET(4 MG) BY MOUTH DAILY AT BEDTIME AS NEEDED FOR ALLERGIES OR HEADACHE   pantoprazole (PROTONIX) 40 mg tablet   No No   Sig: Take 1 tablet (40 mg total) by mouth 2 (two) times a day   sucralfate (CARAFATE) 1 g tablet   No No   Sig: TAKE 1 TABLET(1 GRAM) BY MOUTH " FOUR TIMES DAILY      Facility-Administered Medications: None       Past Medical History:   Diagnosis Date    Anxiety     Asthma     Depression     Hypertension     Migraine     Rapid heart rate        Past Surgical History:   Procedure Laterality Date    BARIATRIC SURGERY      CHOLECYSTECTOMY      GASTRIC BYPASS  2018    also had repair of perforated bowel afterwards    HERNIA REPAIR      HYSTERECTOMY      LUNG SURGERY      [place] REPAIR FIRST ABDOMINAL WALL HERNIA N/A 2022    Procedure: OPEN INCISIONAL HERNIA REPAIR WITH MESH;  Surgeon: Momo Lancaster MD;  Location: [facility];  Service: General    UPPER GASTROINTESTINAL ENDOSCOPY         Family History   Problem Relation Age of Onset    Cancer Mother         liver    Diabetes Father     Lung cancer Father     No Known Problems Sister     No Known Problems Sister     No Known Problems Sister     No Known Problems Daughter     No Known Problems Maternal Grandmother     No Known Problems Maternal Grandfather     No Known Problems Paternal Grandmother     No Known Problems Paternal Grandfather     No Known Problems Son     No Known Problems Maternal Aunt     No Known Problems Maternal Aunt     No Known Problems Paternal Aunt      I have reviewed and agree with the history as documented  E-Cigarette/Vaping    E-Cigarette Use Never User      E-Cigarette/Vaping Substances    Nicotine No     THC No     CBD No     Flavoring No     Other No     Unknown No      Social History     Tobacco Use    Smoking status: Former     Types: Cigarettes     Quit date:      Years since quittin 3    Smokeless tobacco: Never   Vaping Use    Vaping Use: Never used   Substance Use Topics    Alcohol use: Yes     Comment: Occ socially    Drug use: Not Currently     Types: Cocaine     Comment: last cocain use        Review of Systems   Constitutional: Negative for chills and fever  HENT: Negative for hearing loss and sore throat  Respiratory: Negative for cough and shortness of breath  Cardiovascular: Negative for chest pain and leg swelling  Gastrointestinal: Positive for abdominal pain and constipation  Negative for diarrhea, nausea and vomiting  Dark stools   Genitourinary: Negative for dysuria, frequency, vaginal bleeding and vaginal discharge  Musculoskeletal: Negative for arthralgias and myalgias  Skin: Negative for wound  Neurological: Negative for dizziness, weakness and light-headedness  Psychiatric/Behavioral: Negative for behavioral problems  All other systems reviewed and are negative  Physical Exam  Physical Exam  Vitals and nursing note reviewed  Constitutional:       General: She is in acute distress (mild)  Appearance: She is well-developed  She is obese  HENT:      Head: Normocephalic and atraumatic  Right Ear: External ear normal       Left Ear: External ear normal       Nose: Nose normal       Mouth/Throat:      Mouth: Mucous membranes are moist       Pharynx: Oropharynx is clear  Eyes:      Conjunctiva/sclera: Conjunctivae normal    Cardiovascular:      Rate and Rhythm: Normal rate and regular rhythm  Pulmonary:      Effort: Pulmonary effort is normal  No respiratory distress  Breath sounds: Normal breath sounds  Abdominal:      General: Bowel sounds are normal       Palpations: Abdomen is soft  Tenderness: There is no abdominal tenderness  There is no right CVA tenderness, left CVA tenderness, guarding or rebound  Genitourinary:     Rectum: Guaiac result negative  Comments: Light brown stool, no acute blood, no hemorrhoid  Musculoskeletal:         General: Normal range of motion  Cervical back: Normal range of motion  Skin:     General: Skin is warm and dry  Neurological:      Mental Status: She is alert and oriented to person, place, and time     Psychiatric:         Behavior: Behavior normal          Vital Signs  ED Triage Vitals   Temperature Pulse Respirations Blood Pressure SpO2   05/05/23 1353 05/05/23 1353 05/05/23 1353 05/05/23 1354 05/05/23 1353   97 8 °F (36 6 °C) 88 16 116/82 100 %      Temp Source Heart Rate Source Patient Position - Orthostatic VS BP Location FiO2 (%)   05/05/23 1353 -- -- -- --   Oral          Pain Score       --                  Vitals:    05/05/23 1353 05/05/23 1354   BP:  116/82   Pulse: 88          Visual Acuity      ED Medications  Medications   sodium chloride 0 9 % bolus 1,000 mL (0 mL Intravenous Stopped 5/5/23 1525)   iohexol (OMNIPAQUE) 350 MG/ML injection (SINGLE-DOSE) 100 mL (100 mL Intravenous Given 5/5/23 1632)   iohexol (OMNIPAQUE) 240 MG/ML solution 50 mL (50 mL Oral Given 5/5/23 1631)       Diagnostic Studies  Results Reviewed     Procedure Component Value Units Date/Time    Urine Microscopic [[id number]]  (Abnormal) Collected: 05/05/23 1525    Lab Status: Final result Specimen: Urine, Clean Catch Updated: 05/05/23 1608     RBC, UA 0-5 /hpf      WBC, UA 0-5 /hpf      Epithelial Cells Occasional /hpf      Bacteria, UA Moderate /hpf     UA w Reflex to Microscopic w Reflex to Culture [[id number]]  (Abnormal) Collected: 05/05/23 1525    Lab Status: Final result Specimen: Urine, Clean Catch Updated: 05/05/23 1538     Color, UA Yellow     Clarity, UA Clear     Specific Gravity, UA <=1 005     pH, UA 7 0     Leukocytes, UA Trace     Nitrite, UA Positive     Protein, UA Negative mg/dl      Glucose, UA Negative mg/dl      Ketones, UA Negative mg/dl      Urobilinogen, UA 0 2 E U /dl      Bilirubin, UA Negative     Occult Blood, UA Negative    Lactic acid, plasma (w/reflex if result > 2 0) [[id number]]  (Normal) Collected: 05/05/23 1425    Lab Status: Final result Specimen: Blood from Arm, Right Updated: 05/05/23 1509     LACTIC ACID 1 2 mmol/L     Narrative:      Result may be elevated if tourniquet was used during collection      Occult Blood, Fecal Immunochemical [[id number]]  (Normal) Collected: 05/05/23 1438    Lab Status: Final result Specimen: Stool from Per Rectum Updated: 05/05/23 1500     OCCULT BLD, FECAL IMMUNOLOGICAL Negative    Narrative:        Performed by Fecal Immunochemical Test     Comprehensive metabolic panel [[id number]] Collected: 05/05/23 1425    Lab Status: Final result Specimen: Blood from Arm, Right Updated: 05/05/23 1458     Sodium 139 mmol/L      Potassium 4 0 mmol/L      Chloride 107 mmol/L      CO2 23 mmol/L      ANION GAP 9 mmol/L      BUN 8 mg/dL      Creatinine 0 83 mg/dL      Glucose 97 mg/dL      Calcium 8 7 mg/dL      AST 16 U/L      ALT 16 U/L      Alkaline Phosphatase 96 U/L      Total Protein 6 5 g/dL      Albumin 4 0 g/dL      Total Bilirubin 0 32 mg/dL      eGFR 79 ml/min/1 73sq m     Narrative:      National Kidney Disease Foundation guidelines for Chronic Kidney Disease (CKD):     Stage 1 with normal or high GFR (GFR > 90 mL/min/1 73 square meters)    Stage 2 Mild CKD (GFR = 60-89 mL/min/1 73 square meters)    Stage 3A Moderate CKD (GFR = 45-59 mL/min/1 73 square meters)    Stage 3B Moderate CKD (GFR = 30-44 mL/min/1 73 square meters)    Stage 4 Severe CKD (GFR = 15-29 mL/min/1 73 square meters)    Stage 5 End Stage CKD (GFR <15 mL/min/1 73 square meters)  Note: GFR calculation is accurate only with a steady state creatinine    Lipase [[id number]]  (Normal) Collected: 05/05/23 1425    Lab Status: Final result Specimen: Blood from Arm, Right Updated: 05/05/23 1458     Lipase 32 u/L     CBC and differential [[id number]]  (Abnormal) Collected: 05/05/23 1425    Lab Status: Final result Specimen: Blood from Arm, Right Updated: 05/05/23 1438     WBC 6 69 Thousand/uL      RBC 5 19 Million/uL      Hemoglobin 12 7 g/dL      Hematocrit 40 4 %      MCV 78 fL      MCH 24 5 pg      MCHC 31 4 g/dL      RDW 15 7 %      MPV 9 7 fL      Platelets 525 Thousands/uL      nRBC 0 /100 WBCs      Neutrophils Relative 62 %      Immat GRANS % 0 %      Lymphocytes Relative 25 %      Monocytes Relative 6 % Eosinophils Relative 6 %      Basophils Relative 1 %      Neutrophils Absolute 4 13 Thousands/µL      Immature Grans Absolute 0 02 Thousand/uL      Lymphocytes Absolute 1 69 Thousands/µL      Monocytes Absolute 0 42 Thousand/µL      Eosinophils Absolute 0 38 Thousand/µL      Basophils Absolute 0 05 Thousands/µL                  CT abdomen pelvis with contrast   Final Result by Josie Morgan MD (05/05 1733)   1  Status post gastric bypass with enteric contrast material again noted within the excluded portion of the stomach may reflect retrograde filling with wound dehiscence difficult to entirely exclude  No obstruction  2  Diverticular disease without diverticulitis  3  No acute findings  Workstation performed: [id number]                    Procedures  Procedures         ED Course  ED Course as of 05/05/23 1755   Fri May 05, 2023   1443 WBC: 6 69   1443 Hemoglobin: 12 7   1443 HCT: 40 4   1515 OCCULT BLD, FECAL IMMUNOLOGICAL: Negative   1515 LACTIC ACID: 1 2   1515 Lipase: 32   1539 Leukocytes, UA(!): Trace   1539 Nitrite, UA(!): Positive  + UTI                                             Medical Decision Making  Pt with constipation, relieved today with BM, but pt concerned bc stool black in color  ON Bright red blood, hemoccult judi, stool light brown in ED, labs wnl, UA: UTI    Amount and/or Complexity of Data Reviewed  External Data Reviewed: notes  Details: 1/2023 EGD: IMPRESSION: Esophagus-1 cm sliding hiatal hernia was noted with Schatzki's ring at the GE junction  Stomach status post gastric bypass surgery  About 1 cm fresh superficial ulceration was noted at the anastomosis towards the jejunal side  This appears to have improved from before but still present  RECOMMENDATION:  Schedule repeat EGD  Explained to patient in detail about the importance of taking pantoprazole twice daily and also Carafate    repeat EGD scheduled 5/23/2023    Labs: ordered   Decision-making details documented in ED Course  Radiology: ordered  Details: results rev'd, discussed with attending, no acute findings      Risk  Prescription drug management  Disposition  Final diagnoses:   Constipation, unspecified constipation type - resolved   UTI (urinary tract infection)     Time reflects when diagnosis was documented in both MDM as applicable and the Disposition within this note     Time User Action Codes Description Comment    5/5/2023  5:52 PM Sherryle Primus Add [K59 00] Constipation, unspecified constipation type     5/5/2023  5:52 PM Sherryle Primus Modify [K59 00] Constipation, unspecified constipation type resolved    5/5/2023  5:52 PM Sherryle Primus Add [N39 0] UTI (urinary tract infection)       ED Disposition     ED Disposition   Discharge    Condition   Stable    Date/Time   Fri May 5, 2023  5:55 PM    [facility] discharge to home/self care  Follow-up Information     Follow up With Specialties Details Why Contact Info    Prieto Lamas MD Family Medicine   [address] [place] [address] 5  [address] [place] [address] Surgery   [address] [place] [address]  [phone]      Your GI specialist              Patient's Medications   Discharge Prescriptions    CEPHALEXIN (KEFLEX) 500 MG CAPSULE    Take 1 capsule (500 mg total) by mouth 3 (three) times a day for 7 days       Start Date: 5/5/2023  End Date: 5/12/2023       Order Dose: 500 mg       Quantity: 21 capsule    Refills: 0       No discharge procedures on file      PDMP Review       Value Time User    PDMP Reviewed  Yes 12/2/2022  2:04 PM Jacinta Ruiz PA-C          ED Provider  Electronically Signed by           Jeniffer Dahl PA-C  05/05/23 [phone]

## 2023-05-05 NOTE — DISCHARGE INSTRUCTIONS
Take all oral antibiotics until done  Follow-up with your PCP if no improvement in condition  Follow-up with your GI as scheduled, continue taking your Protonix and Carafate as previously prescribed  Follow-up with your surgeon to follow post-surgical changes

## 2023-05-17 ENCOUNTER — OFFICE VISIT (OUTPATIENT)
Dept: SURGERY | Facility: CLINIC | Age: 58
End: 2023-05-17

## 2023-05-17 VITALS
HEIGHT: 62 IN | HEART RATE: 91 BPM | OXYGEN SATURATION: 98 % | BODY MASS INDEX: 33.31 KG/M2 | TEMPERATURE: 98.1 F | SYSTOLIC BLOOD PRESSURE: 122 MMHG | DIASTOLIC BLOOD PRESSURE: 70 MMHG | WEIGHT: 181 LBS

## 2023-05-17 DIAGNOSIS — K28.9 MARGINAL ULCER: Primary | ICD-10-CM

## 2023-05-17 DIAGNOSIS — Z98.84 H/O BARIATRIC SURGERY: ICD-10-CM

## 2023-05-17 RX ORDER — LAMOTRIGINE 25 MG/1
50 TABLET ORAL 2 TIMES DAILY
COMMUNITY
Start: 2023-02-17

## 2023-05-17 RX ORDER — ALBUTEROL SULFATE 90 UG/1
AEROSOL, METERED RESPIRATORY (INHALATION)
COMMUNITY
Start: 2023-05-17

## 2023-05-17 RX ORDER — ACETAMINOPHEN AND CODEINE PHOSPHATE 300; 15 MG/1; MG/1
1 TABLET ORAL EVERY 8 HOURS PRN
COMMUNITY
Start: 2023-03-15

## 2023-05-17 RX ORDER — FERROUS SULFATE 325(65) MG
TABLET ORAL
COMMUNITY
Start: 2023-03-01

## 2023-05-17 NOTE — PROGRESS NOTES
Assessment/Plan: I showed the images of the CT scan to the patient  I explained to her that what was confused by the radiologist as gastric remnant is actually a distended gastric pouch  She does not have a large gastric remnant as majority of it was removed at the time of conversion from sleeve gastrectomy to gastric bypass  There is no obstruction to the flow of contrast   The contrast was reaching up to the colon without any problems  There is no distended bowel loops  There is no recurrence of hernia  I also reviewed the images of the endoscopy with her  I told her that she has a marginal ulcer which is improving  I encouraged her to start taking PPIs again  She says that she will do it  She is scheduled for repeat endoscopy by GI next week  Follow-up with me as needed  No surgical intervention needed at this time  No problem-specific Assessment & Plan notes found for this encounter  Diagnoses and all orders for this visit:    Marginal ulcer    H/O bariatric surgery    Other orders  -     acetaminophen-codeine (TYLENOL with CODEINE #2) 300-15 MG; Take 1 tablet by mouth every 8 (eight) hours as needed  -     albuterol (PROVENTIL HFA,VENTOLIN HFA) 90 mcg/act inhaler  -     FeroSul 325 (65 Fe) MG tablet  -     lamoTRIgine (LaMICtal) 25 mg tablet; Take 50 mg by mouth 2 (two) times a day          Subjective:      Patient ID: Maya Reyes is a 65 y o  female  67-year-old female came to my office because of a recent CT scan findings of distended gastric remnant as reported by the radiologist   Patient has history of sleeve gastrectomy converted to gastric bypass  Since the surgeries were done by me I doubt that part of the gastric remnant was removed  Patient suffers from a small marginal ulcer and as per the review of the endoscopy this also was getting better  Patient does not complain of any abdominal pain  Patient does not have any blood in stools    Patient tells me that she is tired of taking medications and has stopped taking all the medications prescribed by the gastroenterologist   She has no nausea or vomiting  She is going to have another endoscopy done next week  The following portions of the patient's history were reviewed and updated as appropriate:   She  has a past medical history of Anxiety, Asthma, Depression, Hypertension, Migraine, and Rapid heart rate  She   Patient Active Problem List    Diagnosis Date Noted   • H/O bariatric surgery 02/10/2023   • Gastroesophageal reflux disease 02/10/2023   • Abdominal pain 08/01/2022   • History of colon polyps 08/01/2022   • Change in bowel habits 08/01/2022   • Incisional hernia 01/29/2022   • Mild asthma 01/28/2022   • History of hysterectomy 01/28/2022   • Anxiety 01/27/2022   • HTN (hypertension) 01/27/2022   • B12 deficiency 12/14/2020   • Vitamin D deficiency 12/14/2020   • Chronic migraine without aura 04/24/2019   • Peripheral neuropathy 04/24/2019     She  has a past surgical history that includes Gastric bypass (2018); Hernia repair; Cholecystectomy; Lung surgery; Hysterectomy; pr repair first abdominal wall hernia (N/A, 01/28/2022); Upper gastrointestinal endoscopy; and Bariatric Surgery  Her family history includes Cancer in her mother; Diabetes in her father; Lung cancer in her father; No Known Problems in her daughter, maternal aunt, maternal aunt, maternal grandfather, maternal grandmother, paternal aunt, paternal grandfather, paternal grandmother, sister, sister, sister, and son  She  reports that she quit smoking about 37 years ago  Her smoking use included cigarettes  She has never used smokeless tobacco  She reports current alcohol use  She reports that she does not currently use drugs after having used the following drugs: Cocaine    Current Outpatient Medications   Medication Sig Dispense Refill   • acetaminophen-codeine (TYLENOL with CODEINE #2) 300-15 MG Take 1 tablet by mouth every 8 (eight) hours as "needed     • albuterol (PROVENTIL HFA,VENTOLIN HFA) 90 mcg/act inhaler      • B-D 3CC LUER-ARACELIS SYR 25GX1/2\" 25G X 1-1/2\" 3 ML MISC USE FOR TORADOL INTRAMUSCULAR INJECTIONS 6 each 2   • budesonide-formoterol (SYMBICORT) 160-4 5 mcg/act inhaler Inhale 2 puffs 2 (two) times a day Morning and evening     • clonazePAM (KlonoPIN) 0 5 mg tablet Take 0 5 mg by mouth daily     • cyproheptadine (PERIACTIN) 4 mg tablet TAKE 1 TABLET(4 MG) BY MOUTH DAILY AT BEDTIME AS NEEDED FOR ALLERGIES OR HEADACHE 90 tablet 3   • FeroSul 325 (65 Fe) MG tablet      • FLUoxetine (PROzac) 40 MG capsule TAKE 2 CAPSULES BY MOUTH EVERY DAY IN THE MORNING     • lamoTRIgine (LaMICtal) 25 mg tablet Take 50 mg by mouth 2 (two) times a day     • pantoprazole (PROTONIX) 40 mg tablet Take 1 tablet (40 mg total) by mouth 2 (two) times a day 60 tablet 3   • sucralfate (CARAFATE) 1 g tablet TAKE 1 TABLET(1 GRAM) BY MOUTH FOUR TIMES DAILY 360 tablet 0   • Syringe/Needle, Disp, (SYRINGE 3CC/78QV0-4/4\") 27G X 1-1/4\" 3 ML MISC Use for Toradol intramuscular injections  3 each 2     No current facility-administered medications for this visit       Current Outpatient Medications on File Prior to Visit   Medication Sig   • acetaminophen-codeine (TYLENOL with CODEINE #2) 300-15 MG Take 1 tablet by mouth every 8 (eight) hours as needed   • albuterol (PROVENTIL HFA,VENTOLIN HFA) 90 mcg/act inhaler    • B-D 3CC LUER-ARACELIS SYR 25GX1/2\" 25G X 1-1/2\" 3 ML MISC USE FOR TORADOL INTRAMUSCULAR INJECTIONS   • budesonide-formoterol (SYMBICORT) 160-4 5 mcg/act inhaler Inhale 2 puffs 2 (two) times a day Morning and evening   • clonazePAM (KlonoPIN) 0 5 mg tablet Take 0 5 mg by mouth daily   • cyproheptadine (PERIACTIN) 4 mg tablet TAKE 1 TABLET(4 MG) BY MOUTH DAILY AT BEDTIME AS NEEDED FOR ALLERGIES OR HEADACHE   • FeroSul 325 (65 Fe) MG tablet    • FLUoxetine (PROzac) 40 MG capsule TAKE 2 CAPSULES BY MOUTH EVERY DAY IN THE MORNING   • lamoTRIgine (LaMICtal) 25 mg tablet Take 50 mg " "by mouth 2 (two) times a day   • pantoprazole (PROTONIX) 40 mg tablet Take 1 tablet (40 mg total) by mouth 2 (two) times a day   • sucralfate (CARAFATE) 1 g tablet TAKE 1 TABLET(1 GRAM) BY MOUTH FOUR TIMES DAILY   • Syringe/Needle, Disp, (SYRINGE 3CC/51NV3-9/4\") 27G X 1-1/4\" 3 ML MISC Use for Toradol intramuscular injections  No current facility-administered medications on file prior to visit  She is allergic to penicillins, latex, and other       Review of Systems   Constitutional: Negative  HENT: Negative  Eyes: Negative  Respiratory: Negative  Gastrointestinal: Negative  Endocrine: Negative  Genitourinary: Negative  Musculoskeletal: Negative  Skin: Negative  Allergic/Immunologic: Negative  Neurological: Negative  Hematological: Negative  Psychiatric/Behavioral: Negative  Objective:      /70   Pulse 91   Temp 98 1 °F (36 7 °C)   Ht 5' 2\" (1 575 m)   Wt 82 1 kg (181 lb)   SpO2 98%   BMI 33 11 kg/m²          Physical Exam  Vitals reviewed  Constitutional:       Appearance: She is well-developed  Abdominal:      General: Abdomen is flat  A surgical scar is present  Palpations: Abdomen is soft  Tenderness: There is no abdominal tenderness  Hernia: No hernia is present  Neurological:      Mental Status: She is alert           "

## 2023-05-22 RX ORDER — SODIUM CHLORIDE, SODIUM LACTATE, POTASSIUM CHLORIDE, CALCIUM CHLORIDE 600; 310; 30; 20 MG/100ML; MG/100ML; MG/100ML; MG/100ML
125 INJECTION, SOLUTION INTRAVENOUS CONTINUOUS
Status: CANCELLED | OUTPATIENT
Start: 2023-05-22

## 2023-06-19 NOTE — TELEPHONE ENCOUNTER
Patients GI provider:  Dr Nan Stahl    Number to return call: [phone]     Reason for call: Pt called regarding RX sucralfate  Pt states the medication is too expensive and would like to know if there is an alternate medication she can take in replacement of this one       Scheduled procedure/appointment date if applicable: N/A kacy        Social History     Tobacco Use    Smoking status: Former     Packs/day: 1.00     Years: 15.00     Pack years: 15.00     Types: Cigarettes     Quit date: 2013     Years since quittin.8     Passive exposure: Past    Smokeless tobacco: Never   Substance Use Topics    Alcohol use: Yes     Alcohol/week: 28.0 standard drinks    Drug use: No       Physical Exam:  Blood pressure 106/61, pulse 75, temperature 97.5 °F (36.4 °C), temperature source Temporal, resp. rate 18, height 6' 2\" (1.88 m), weight 161 lb 9.6 oz (73.3 kg), SpO2 95 %. Pleasant male in no apparent distress. Affect is appropriate. Lower eyelids are not pale. HEENT grossly normal.  Oral mucosa is moist and intact. No cervical lymphadenopathy or thyromegaly or nodularity. Lungs clear. Cardiovascular regular S1-S2 without murmurs rubs or gallops. Radial pulses 2+. Abdomen is soft and nontender with positive bowel sounds. No masses palpated. 2+ patellar DTRs bilateral.  Posterior thorax dermatitis significantly fading. No peripheral edema or cyanosis. Moves all extremities well. Assessment and Plan:   Diagnosis Orders   1. Acute blood loss anemia (ABLA)  CBC with Auto Differential      2. Positive RPR test  HIV 1/2 Ag/Ab, 4TH Generation,W Rflx Confirm    RPR    CT+NG+M Genitalium by ELIAS, Ur      3. High risk heterosexual behavior  HIV 1/2 Ag/Ab, 4TH Generation,W Rflx Confirm    RPR    CT+NG+M Genitalium by ELIAS, Ur      4. Attention deficit hyperactivity disorder (ADHD), combined type  amphetamine-dextroamphetamine (ADDERALL) 12.5 MG tablet    amphetamine-dextroamphetamine (ADDERALL, 12.5MG,) 12.5 MG tablet    amphetamine-dextroamphetamine (ADDERALL, 12.5MG,) 12.5 MG tablet      5. Gastro-esophageal reflux disease without esophagitis  pantoprazole (PROTONIX) 40 MG tablet      6. Fatigue, unspecified type  Testosterone Total Only, Male        Information on heavy bleeding anemia, ADHD, GERD, and fatigue provided.   Repeat

## 2023-07-25 ENCOUNTER — HOSPITAL ENCOUNTER (EMERGENCY)
Facility: HOSPITAL | Age: 58
Discharge: HOME | End: 2023-07-25
Attending: EMERGENCY MEDICINE
Payer: COMMERCIAL

## 2023-07-25 ENCOUNTER — APPOINTMENT (EMERGENCY)
Dept: CT IMAGING | Facility: HOSPITAL | Age: 58
End: 2023-07-25
Payer: COMMERCIAL

## 2023-07-25 VITALS
SYSTOLIC BLOOD PRESSURE: 125 MMHG | DIASTOLIC BLOOD PRESSURE: 78 MMHG | OXYGEN SATURATION: 100 % | RESPIRATION RATE: 15 BRPM | TEMPERATURE: 98.1 F | HEART RATE: 95 BPM

## 2023-07-25 DIAGNOSIS — R10.13 EPIGASTRIC PAIN: Primary | ICD-10-CM

## 2023-07-25 DIAGNOSIS — K52.9 ENTERITIS: ICD-10-CM

## 2023-07-25 LAB
ALBUMIN SERPL BCP-MCNC: 4.2 G/DL (ref 3.5–5)
ALP SERPL-CCNC: 86 U/L (ref 34–104)
ALT SERPL W P-5'-P-CCNC: 14 U/L (ref 7–52)
ANION GAP SERPL CALCULATED.3IONS-SCNC: 7 MMOL/L
AST SERPL W P-5'-P-CCNC: 15 U/L (ref 13–39)
BASOPHILS # BLD AUTO: 0.06 THOUSANDS/ÂΜL (ref 0–0.1)
BASOPHILS NFR BLD AUTO: 1 % (ref 0–1)
BILIRUB SERPL-MCNC: 0.32 MG/DL (ref 0.2–1)
BUN SERPL-MCNC: 9 MG/DL (ref 5–25)
CALCIUM SERPL-MCNC: 9 MG/DL (ref 8.4–10.2)
CARDIAC TROPONIN I PNL SERPL HS: 4 NG/L
CHLORIDE SERPL-SCNC: 104 MMOL/L (ref 96–108)
CO2 SERPL-SCNC: 27 MMOL/L (ref 21–32)
CREAT SERPL-MCNC: 0.94 MG/DL (ref 0.6–1.3)
EOSINOPHIL # BLD AUTO: 0.36 THOUSAND/ÂΜL (ref 0–0.61)
EOSINOPHIL NFR BLD AUTO: 6 % (ref 0–6)
ERYTHROCYTE [DISTWIDTH] IN BLOOD BY AUTOMATED COUNT: 15.8 % (ref 11.6–15.1)
GFR SERPL CREATININE-BSD FRML MDRD: 68 ML/MIN/1.73SQ M
GLUCOSE SERPL-MCNC: 117 MG/DL (ref 65–140)
HCT VFR BLD AUTO: 41.7 % (ref 34.8–46.1)
HGB BLD-MCNC: 13.1 G/DL (ref 11.5–15.4)
IMM GRANULOCYTES # BLD AUTO: 0.02 THOUSAND/UL (ref 0–0.2)
IMM GRANULOCYTES NFR BLD AUTO: 0 % (ref 0–2)
LIPASE SERPL-CCNC: 33 U/L (ref 11–82)
LYMPHOCYTES # BLD AUTO: 1.71 THOUSANDS/ÂΜL (ref 0.6–4.47)
LYMPHOCYTES NFR BLD AUTO: 26 % (ref 14–44)
MCH RBC QN AUTO: 24.7 PG (ref 26.8–34.3)
MCHC RBC AUTO-ENTMCNC: 31.4 G/DL (ref 31.4–37.4)
MCV RBC AUTO: 79 FL (ref 82–98)
MONOCYTES # BLD AUTO: 0.34 THOUSAND/ÂΜL (ref 0.17–1.22)
MONOCYTES NFR BLD AUTO: 5 % (ref 4–12)
NEUTROPHILS # BLD AUTO: 4.08 THOUSANDS/ÂΜL (ref 1.85–7.62)
NEUTS SEG NFR BLD AUTO: 62 % (ref 43–75)
NRBC BLD AUTO-RTO: 0 /100 WBCS
PLATELET # BLD AUTO: 340 THOUSANDS/UL (ref 149–390)
PMV BLD AUTO: 9.4 FL (ref 8.9–12.7)
POTASSIUM SERPL-SCNC: 4 MMOL/L (ref 3.5–5.3)
PROT SERPL-MCNC: 6.9 G/DL (ref 6.4–8.4)
RBC # BLD AUTO: 5.3 MILLION/UL (ref 3.81–5.12)
SODIUM SERPL-SCNC: 138 MMOL/L (ref 135–147)
WBC # BLD AUTO: 6.57 THOUSAND/UL (ref 4.31–10.16)

## 2023-07-25 PROCEDURE — 85025 COMPLETE CBC W/AUTO DIFF WBC: CPT | Performed by: EMERGENCY MEDICINE

## 2023-07-25 PROCEDURE — 83690 ASSAY OF LIPASE: CPT | Performed by: EMERGENCY MEDICINE

## 2023-07-25 PROCEDURE — 93005 ELECTROCARDIOGRAM TRACING: CPT

## 2023-07-25 PROCEDURE — 80053 COMPREHEN METABOLIC PANEL: CPT | Performed by: EMERGENCY MEDICINE

## 2023-07-25 PROCEDURE — 36415 COLL VENOUS BLD VENIPUNCTURE: CPT | Performed by: EMERGENCY MEDICINE

## 2023-07-25 PROCEDURE — G1004 CDSM NDSC: HCPCS

## 2023-07-25 PROCEDURE — 74177 CT ABD & PELVIS W/CONTRAST: CPT

## 2023-07-25 PROCEDURE — 84484 ASSAY OF TROPONIN QUANT: CPT | Performed by: EMERGENCY MEDICINE

## 2023-07-25 RX ORDER — MORPHINE SULFATE 4 MG/ML
4 INJECTION, SOLUTION INTRAMUSCULAR; INTRAVENOUS ONCE
Status: COMPLETED | OUTPATIENT
Start: 2023-07-25 | End: 2023-07-25

## 2023-07-25 RX ORDER — ONDANSETRON 2 MG/ML
4 INJECTION INTRAMUSCULAR; INTRAVENOUS ONCE
Status: COMPLETED | OUTPATIENT
Start: 2023-07-25 | End: 2023-07-25

## 2023-07-25 RX ADMIN — IOHEXOL 85 ML: 350 INJECTION, SOLUTION INTRAVENOUS at 20:26

## 2023-07-25 RX ADMIN — MORPHINE SULFATE 4 MG: 4 INJECTION INTRAVENOUS at 20:36

## 2023-07-25 RX ADMIN — MORPHINE SULFATE 4 MG: 4 INJECTION INTRAVENOUS at 18:53

## 2023-07-25 RX ADMIN — ONDANSETRON 4 MG: 2 INJECTION INTRAMUSCULAR; INTRAVENOUS at 20:36

## 2023-07-25 RX ADMIN — ONDANSETRON 4 MG: 2 INJECTION INTRAMUSCULAR; INTRAVENOUS at 18:48

## 2023-07-25 RX ADMIN — IOHEXOL 50 ML: 240 INJECTION, SOLUTION INTRATHECAL; INTRAVASCULAR; INTRAVENOUS; ORAL at 20:26

## 2023-07-25 NOTE — ED PROVIDER NOTES
History  Chief Complaint   Patient presents with   • Abdominal Pain     Pt reports sharp midline abdominal pain x2 hours, 1 episode of vomiting, pt took tums without relief. Pt reports having stomach ulcer and hernia but states this pain is different     58-year-old female with history of gastric bypass presents to the emergency department for evaluation of epigastric abdominal pain. The patient reports that approximately 4 hours prior to arrival she developed acute onset epigastric pain while she was sitting on her couch at home. Patient describes the pain as sharp nonradiating 8 out of 10 pain. She had 1 associated episode of nonbloody and nonbilious vomiting. She took a dose of Tums without relief so came to the emergency department for further evaluation. She denies fevers, chills, diarrhea, sick contacts, recent travel, chest pain, shortness of breath and localized numbness, tingling or weakness. Prior to Admission Medications   Prescriptions Last Dose Informant Patient Reported? Taking? B-D 3CC LUER-ARACELIS SYR 25GX1/2" 25G X 1-1/2" 3 ML MISC   No No   Sig: USE FOR TORADOL INTRAMUSCULAR INJECTIONS   FLUoxetine (PROzac) 40 MG capsule  Self Yes No   Sig: TAKE 2 CAPSULES BY MOUTH EVERY DAY IN THE MORNING   FeroSul 325 (65 Fe) MG tablet   Yes No   Syringe/Needle, Disp, (SYRINGE 3CC/86AG8-2/4") 27G X 1-1/4" 3 ML MISC   No No   Sig: Use for Toradol intramuscular injections.    acetaminophen-codeine (TYLENOL with CODEINE #2) 300-15 MG   Yes No   Sig: Take 1 tablet by mouth every 8 (eight) hours as needed   albuterol (PROVENTIL HFA,VENTOLIN HFA) 90 mcg/act inhaler   Yes No   budesonide-formoterol (SYMBICORT) 160-4.5 mcg/act inhaler  Self Yes No   Sig: Inhale 2 puffs 2 (two) times a day Morning and evening   clonazePAM (KlonoPIN) 0.5 mg tablet  Self Yes No   Sig: Take 0.5 mg by mouth daily   cyproheptadine (PERIACTIN) 4 mg tablet   No No   Sig: TAKE 1 TABLET(4 MG) BY MOUTH DAILY AT BEDTIME AS NEEDED FOR ALLERGIES OR HEADACHE   lamoTRIgine (LaMICtal) 25 mg tablet   Yes No   Sig: Take 50 mg by mouth 2 (two) times a day   pantoprazole (PROTONIX) 40 mg tablet   No No   Sig: Take 1 tablet (40 mg total) by mouth 2 (two) times a day   sucralfate (CARAFATE) 1 g tablet   No No   Sig: TAKE 1 TABLET(1 GRAM) BY MOUTH FOUR TIMES DAILY      Facility-Administered Medications: None       Past Medical History:   Diagnosis Date   • Anxiety    • Asthma    • Depression    • Hypertension    • Migraine    • Rapid heart rate        Past Surgical History:   Procedure Laterality Date   • BARIATRIC SURGERY     • CHOLECYSTECTOMY     • GASTRIC BYPASS  2018    also had repair of perforated bowel afterwards   • HERNIA REPAIR     • HYSTERECTOMY     • LUNG SURGERY     • MO REPAIR FIRST ABDOMINAL WALL HERNIA N/A 2022    Procedure: OPEN INCISIONAL HERNIA REPAIR WITH MESH;  Surgeon: Lan Osborne MD;  Location: [facility];  Service: General   • UPPER GASTROINTESTINAL ENDOSCOPY         Family History   Problem Relation Age of Onset   • Cancer Mother         liver   • Diabetes Father    • Lung cancer Father    • No Known Problems Sister    • No Known Problems Sister    • No Known Problems Sister    • No Known Problems Daughter    • No Known Problems Maternal Grandmother    • No Known Problems Maternal Grandfather    • No Known Problems Paternal Grandmother    • No Known Problems Paternal Grandfather    • No Known Problems Son    • No Known Problems Maternal Aunt    • No Known Problems Maternal Aunt    • No Known Problems Paternal Aunt      I have reviewed and agree with the history as documented.     E-Cigarette/Vaping   • E-Cigarette Use Never User      E-Cigarette/Vaping Substances   • Nicotine No    • THC No    • CBD No    • Flavoring No    • Other No    • Unknown No      Social History     Tobacco Use   • Smoking status: Former     Types: Cigarettes     Quit date:      Years since quittin.5   • Smokeless tobacco: Never   Vaping Use • Vaping Use: Never used   Substance Use Topics   • Alcohol use: Yes     Comment: Occ socially   • Drug use: Not Currently     Types: Cocaine     Comment: last cocain use 1992       Review of Systems   Constitutional: Negative for chills and fever. HENT: Negative for ear pain and sore throat. Eyes: Negative for pain and visual disturbance. Respiratory: Negative for cough and shortness of breath. Cardiovascular: Negative for chest pain and palpitations. Gastrointestinal: Positive for abdominal pain, nausea and vomiting. Genitourinary: Negative for dysuria and hematuria. Musculoskeletal: Negative for arthralgias and back pain. Skin: Negative for color change and rash. Neurological: Negative for seizures and syncope. All other systems reviewed and are negative. Physical Exam  Physical Exam  Vitals and nursing note reviewed. Constitutional:       General: She is in acute distress (pain). Appearance: She is well-developed. HENT:      Head: Normocephalic and atraumatic. Eyes:      Conjunctiva/sclera: Conjunctivae normal.   Cardiovascular:      Rate and Rhythm: Normal rate and regular rhythm. Heart sounds: No murmur heard. Pulmonary:      Effort: Pulmonary effort is normal. No respiratory distress. Breath sounds: Normal breath sounds. Abdominal:      General: There is no distension. Palpations: Abdomen is soft. Tenderness: There is abdominal tenderness in the epigastric area. There is no guarding or rebound. Musculoskeletal:         General: No swelling. Cervical back: Neck supple. Skin:     General: Skin is warm and dry. Capillary Refill: Capillary refill takes less than 2 seconds. Neurological:      Mental Status: She is alert.    Psychiatric:         Mood and Affect: Mood normal.         Vital Signs  ED Triage Vitals   Temperature Pulse Respirations Blood Pressure SpO2   07/25/23 1825 07/25/23 1825 07/25/23 1825 07/25/23 1825 07/25/23 1825 98.1 °F (36.7 °C) 83 18 156/77 100 %      Temp Source Heart Rate Source Patient Position - Orthostatic VS BP Location FiO2 (%)   07/25/23 1825 07/25/23 1825 07/25/23 1825 07/25/23 1825 --   Oral Monitor Lying Right arm       Pain Score       07/25/23 1853       9           Vitals:    07/25/23 1825 07/25/23 2046 07/25/23 2309   BP: 156/77 164/59 125/78   Pulse: 83 95 95   Patient Position - Orthostatic VS: Lying Lying Lying         Visual Acuity      ED Medications  Medications   morphine injection 4 mg (4 mg Intravenous Given 7/25/23 1853)   ondansetron (ZOFRAN) injection 4 mg (4 mg Intravenous Given 7/25/23 1848)   iohexol (OMNIPAQUE) 350 MG/ML injection (MULTI-DOSE) 85 mL (85 mL Intravenous Given 7/25/23 2026)   iohexol (OMNIPAQUE) 240 MG/ML solution 50 mL (50 mL Oral Given 7/25/23 2026)   morphine injection 4 mg (4 mg Intravenous Given 7/25/23 2036)   ondansetron (ZOFRAN) injection 4 mg (4 mg Intravenous Given 7/25/23 2036)       Diagnostic Studies  Results Reviewed     Procedure Component Value Units Date/Time    HS Troponin 0hr (reflex protocol) [[id number]]  (Normal) Collected: 07/25/23 1847    Lab Status: Final result Specimen: Blood from Arm, Right Updated: 07/25/23 1914     hs TnI 0hr 4 ng/L     Comprehensive metabolic panel [[id number]] Collected: 07/25/23 1847    Lab Status: Final result Specimen: Blood from Arm, Right Updated: 07/25/23 1906     Sodium 138 mmol/L      Potassium 4.0 mmol/L      Chloride 104 mmol/L      CO2 27 mmol/L      ANION GAP 7 mmol/L      BUN 9 mg/dL      Creatinine 0.94 mg/dL      Glucose 117 mg/dL      Calcium 9.0 mg/dL      AST 15 U/L      ALT 14 U/L      Alkaline Phosphatase 86 U/L      Total Protein 6.9 g/dL      Albumin 4.2 g/dL      Total Bilirubin 0.32 mg/dL      eGFR 68 ml/min/1.73sq m     Narrative:      [place] guidelines for Chronic Kidney Disease (CKD):   •  Stage 1 with normal or high GFR (GFR > 90 mL/min/1.73 square meters)  •  Stage 2 Mild CKD (GFR = 60-89 mL/min/1.73 square meters)  •  Stage 3A Moderate CKD (GFR = 45-59 mL/min/1.73 square meters)  •  Stage 3B Moderate CKD (GFR = 30-44 mL/min/1.73 square meters)  •  Stage 4 Severe CKD (GFR = 15-29 mL/min/1.73 square meters)  •  Stage 5 End Stage CKD (GFR <15 mL/min/1.73 square meters)  Note: GFR calculation is accurate only with a steady state creatinine    Lipase [[id number]]  (Normal) Collected: 07/25/23 1847    Lab Status: Final result Specimen: Blood from Arm, Right Updated: 07/25/23 1906     Lipase 33 u/L     CBC and differential [[id number]]  (Abnormal) Collected: 07/25/23 1847    Lab Status: Final result Specimen: Blood from Arm, Right Updated: 07/25/23 1852     WBC 6.57 Thousand/uL      RBC 5.30 Million/uL      Hemoglobin 13.1 g/dL      Hematocrit 41.7 %      MCV 79 fL      MCH 24.7 pg      MCHC 31.4 g/dL      RDW 15.8 %      MPV 9.4 fL      Platelets 400 Thousands/uL      nRBC 0 /100 WBCs      Neutrophils Relative 62 %      Immat GRANS % 0 %      Lymphocytes Relative 26 %      Monocytes Relative 5 %      Eosinophils Relative 6 %      Basophils Relative 1 %      Neutrophils Absolute 4.08 Thousands/µL      Immature Grans Absolute 0.02 Thousand/uL      Lymphocytes Absolute 1.71 Thousands/µL      Monocytes Absolute 0.34 Thousand/µL      Eosinophils Absolute 0.36 Thousand/µL      Basophils Absolute 0.06 Thousands/µL                  CT abdomen pelvis with contrast   Final Result by Lou Andersen MD (07/26 2481)      No acute intra-abdominal abnormality.             Workstation performed: [id number]                    Procedures  ECG 12 Lead Documentation Only    Date/Time: 7/25/2023 7:00 PM    Performed by: Jean Marie Campos MD  Authorized by: Jean Marie Campos MD    ECG reviewed by me, the ED Provider: yes    Patient location:  ED  Previous ECG:     Previous ECG:  Unavailable    Comparison to cardiac monitor: Yes    Interpretation:     Interpretation: normal    Rate:     ECG rate assessment: normal    Rhythm:     Rhythm: sinus rhythm    Ectopy:     Ectopy: none    QRS:     QRS axis:  Normal  Conduction:     Conduction: normal    ST segments:     ST segments:  Normal  T waves:     T waves: normal               ED Course  ED Course as of 07/26/23 1036   Tue Jul 25, 2023   2300 CT abdomen pelvis with contrast  vRAD: 1. Continued mild bowel wall thickening in jejunal loop extending inferiorly from the gastrojejunostomy suggesting mild enteritis. 2. Stable gastric bypass surgery. SBIRT 20yo+    Flowsheet Row Most Recent Value   Initial Alcohol Screen: US AUDIT-C     1. How often do you have a drink containing alcohol? 0 Filed at: 07/25/2023 1857   2. How many drinks containing alcohol do you have on a typical day you are drinking? 0 Filed at: 07/25/2023 1857   3b. FEMALE Any Age, or MALE 65+: How often do you have 4 or more drinks on one occassion? 0 Filed at: 07/25/2023 1857   Audit-C Score 0 Filed at: 07/25/2023 1857   SAMUEL: How many times in the past year have you. .. Used an illegal drug or used a prescription medication for non-medical reasons? Never Filed at: 07/25/2023 [address] [employer]  60-year-old female presented to the emergency department for evaluation of epigastric abdominal pain. On arrival the patient was awake, alert and in moderate painful distress. Initial vital signs showed that the patient was hypertensive but otherwise vitals were within normal limits. On exam the patient had tenderness to palpation of her epigastric region. No peritoneal signs were present. EKG was ordered to evaluate for acute ischemia/arrhythmia. On my interpretation EKG with a normal sinus rhythm. No acute ischemic changes noted. CT scan of the patient's abdomen and pelvis was ordered to evaluate for acute pathology including but not limited to obstruction, perforation, pancreatitis, cholecystitis.   The patient was treated with analgesic and antiemetic medications. On reevaluation the patient reported improvement of symptoms. CT scan with no acute findings. Blood work was grossly unremarkable. All diagnostic studies were discussed with the patient in detail. She is appropriate for discharge. Recommendation was made for the patient to follow-up with her PCP and with her gastroenterologist.  Return precautions were discussed. Patient agrees with the plan for discharge and feels comfortable to go home with proper f/u. Advised to return for worsening or additional problems. Diagnostic tests were reviewed and questions answered. Diagnosis, care plan and treatment options were discussed. The patient understands instructions and will follow up as directed. Enteritis: acute illness or injury  Epigastric pain: acute illness or injury  Amount and/or Complexity of Data Reviewed  Labs: ordered. Radiology: ordered. Decision-making details documented in ED Course. Risk  Prescription drug management. Disposition  Final diagnoses:   Epigastric pain   Enteritis     Time reflects when diagnosis was documented in both MDM as applicable and the Disposition within this note     Time User Action Codes Description Comment    7/25/2023 10:57 PM María Points Add [R10.13] Epigastric pain     7/25/2023 10:57 PM María Points Add [K52.9] Enteritis       ED Disposition     ED Disposition   Discharge    Condition   Stable    Date/Time   Tue Jul 25, 2023 10:57 PM    [address] discharge to home/self care.                Follow-up Information     Follow up With Specialties Details Why Contact Info Additional Information    Vern Castelan MD Family Medicine Schedule an appointment as soon as possible for a visit   [facility] [phone]       [address] [facility] Emergency Medicine Go to  If symptoms worsen [address] [place] [address] [facility] Emergency Department, [address]  [place], [address] [facility]    Vic Angulo MD Gastroenterology Schedule an appointment as soon as possible for a visit   [address] [facility]. [address] [facility]  [phone]             Discharge Medication List as of 7/25/2023 10:59 PM      CONTINUE these medications which have NOT CHANGED    Details   acetaminophen-codeine (TYLENOL with CODEINE #2) 300-15 MG Take 1 tablet by mouth every 8 (eight) hours as needed, Starting Wed 3/15/2023, Historical Med      albuterol (PROVENTIL HFA,VENTOLIN HFA) 90 mcg/act inhaler Starting Wed 5/17/2023, Historical Med      B-D 3CC LUER-ARACELIS SYR 25GX1/2" 25G X 1-1/2" 3 ML MISC USE FOR TORADOL INTRAMUSCULAR INJECTIONS, Normal      budesonide-formoterol (SYMBICORT) 160-4.5 mcg/act inhaler Inhale 2 puffs 2 (two) times a day Morning and evening, Starting Sun 11/7/2021, Historical Med      clonazePAM (KlonoPIN) 0.5 mg tablet Take 0.5 mg by mouth daily, Historical Med      cyproheptadine (PERIACTIN) 4 mg tablet TAKE 1 TABLET(4 MG) BY MOUTH DAILY AT BEDTIME AS NEEDED FOR ALLERGIES OR HEADACHE, Normal      FeroSul 325 (65 Fe) MG tablet Starting Wed 3/1/2023, Historical Med      FLUoxetine (PROzac) 40 MG capsule TAKE 2 CAPSULES BY MOUTH EVERY DAY IN THE MORNING, Historical Med      lamoTRIgine (LaMICtal) 25 mg tablet Take 50 mg by mouth 2 (two) times a day, Starting Fri 2/17/2023, Historical Med      pantoprazole (PROTONIX) 40 mg tablet Take 1 tablet (40 mg total) by mouth 2 (two) times a day, Starting Fri 2/17/2023, Normal      sucralfate (CARAFATE) 1 g tablet TAKE 1 TABLET(1 GRAM) BY MOUTH FOUR TIMES DAILY, Normal      Syringe/Needle, Disp, (SYRINGE 3CC/51YT3-0/4") 27G X 1-1/4" 3 ML MISC Use for Toradol intramuscular injections. , Normal             No discharge procedures on file.     PDMP Review       Value Time User    PDMP Reviewed  Yes 12/2/2022  2:04 PM Ana Luisa Greer PA-C          ED Provider  Electronically Signed by           Cherelle Klein MD  07/26/23 1956

## 2023-07-26 LAB
ATRIAL RATE: 83 BPM
P AXIS: 51 DEGREES
PR INTERVAL: 152 MS
QRS AXIS: 34 DEGREES
QRSD INTERVAL: 80 MS
QT INTERVAL: 386 MS
QTC INTERVAL: 453 MS
T WAVE AXIS: 64 DEGREES
VENTRICULAR RATE: 83 BPM

## 2023-07-26 PROCEDURE — 93010 ELECTROCARDIOGRAM REPORT: CPT | Performed by: INTERNAL MEDICINE

## 2023-09-21 ENCOUNTER — APPOINTMENT (OUTPATIENT)
Dept: LAB | Facility: HOSPITAL | Age: 58
End: 2023-09-21
Attending: FAMILY MEDICINE
Payer: COMMERCIAL

## 2023-09-21 DIAGNOSIS — E78.5 HYPERLIPIDEMIA, UNSPECIFIED HYPERLIPIDEMIA TYPE: ICD-10-CM

## 2023-09-21 DIAGNOSIS — D64.9 ANEMIA, UNSPECIFIED TYPE: ICD-10-CM

## 2023-09-21 DIAGNOSIS — R73.01 IMPAIRED FASTING GLUCOSE: ICD-10-CM

## 2023-09-21 LAB
CHOLEST SERPL-MCNC: 216 MG/DL
EST. AVERAGE GLUCOSE BLD GHB EST-MCNC: 114 MG/DL
FERRITIN SERPL-MCNC: 7 NG/ML (ref 11–307)
HBA1C MFR BLD: 5.6 %
HDLC SERPL-MCNC: 65 MG/DL
IRON SATN MFR SERPL: 12 % (ref 15–50)
IRON SERPL-MCNC: 54 UG/DL (ref 50–212)
LDLC SERPL CALC-MCNC: 133 MG/DL (ref 0–100)
NONHDLC SERPL-MCNC: 151 MG/DL
TIBC SERPL-MCNC: 468 UG/DL (ref 250–450)
TRIGL SERPL-MCNC: 92 MG/DL
TSH SERPL DL<=0.05 MIU/L-ACNC: 2.19 UIU/ML (ref 0.45–4.5)
UIBC SERPL-MCNC: 414 UG/DL (ref 155–355)

## 2023-09-21 PROCEDURE — 80061 LIPID PANEL: CPT

## 2023-09-21 PROCEDURE — 82728 ASSAY OF FERRITIN: CPT

## 2023-09-21 PROCEDURE — 84443 ASSAY THYROID STIM HORMONE: CPT

## 2023-09-21 PROCEDURE — 36415 COLL VENOUS BLD VENIPUNCTURE: CPT

## 2023-09-21 PROCEDURE — 83540 ASSAY OF IRON: CPT

## 2023-09-21 PROCEDURE — 83550 IRON BINDING TEST: CPT

## 2023-09-21 PROCEDURE — 83036 HEMOGLOBIN GLYCOSYLATED A1C: CPT

## 2023-12-11 ENCOUNTER — HOSPITAL ENCOUNTER (OUTPATIENT)
Facility: HOSPITAL | Age: 58
Setting detail: OBSERVATION
Discharge: HOME | End: 2023-12-13
Attending: EMERGENCY MEDICINE | Admitting: INTERNAL MEDICINE
Payer: COMMERCIAL

## 2023-12-11 ENCOUNTER — APPOINTMENT (EMERGENCY)
Dept: RADIOLOGY | Facility: HOSPITAL | Age: 58
End: 2023-12-11
Payer: COMMERCIAL

## 2023-12-11 DIAGNOSIS — J20.5 RSV BRONCHITIS: Primary | ICD-10-CM

## 2023-12-11 DIAGNOSIS — J45.901 ASTHMA EXACERBATION: ICD-10-CM

## 2023-12-11 PROBLEM — J96.01 ACUTE RESPIRATORY FAILURE WITH HYPOXIA (HCC): Status: ACTIVE | Noted: 2023-12-11

## 2023-12-11 PROBLEM — R65.10 SIRS (SYSTEMIC INFLAMMATORY RESPONSE SYNDROME) (HCC): Status: ACTIVE | Noted: 2023-12-11

## 2023-12-11 LAB
ANION GAP SERPL CALCULATED.3IONS-SCNC: 10 MMOL/L
ATRIAL RATE: 109 BPM
BASOPHILS # BLD AUTO: 0.02 THOUSANDS/ÂΜL (ref 0–0.1)
BASOPHILS NFR BLD AUTO: 0 % (ref 0–1)
BNP SERPL-MCNC: 22 PG/ML (ref 0–100)
BUN SERPL-MCNC: 10 MG/DL (ref 5–25)
CALCIUM SERPL-MCNC: 8.7 MG/DL (ref 8.4–10.2)
CARDIAC TROPONIN I PNL SERPL HS: 3 NG/L
CHLORIDE SERPL-SCNC: 107 MMOL/L (ref 96–108)
CO2 SERPL-SCNC: 22 MMOL/L (ref 21–32)
CREAT SERPL-MCNC: 0.85 MG/DL (ref 0.6–1.3)
EOSINOPHIL # BLD AUTO: 0.23 THOUSAND/ÂΜL (ref 0–0.61)
EOSINOPHIL NFR BLD AUTO: 4 % (ref 0–6)
ERYTHROCYTE [DISTWIDTH] IN BLOOD BY AUTOMATED COUNT: 14.6 % (ref 11.6–15.1)
FLUAV RNA RESP QL NAA+PROBE: NEGATIVE
FLUBV RNA RESP QL NAA+PROBE: NEGATIVE
GFR SERPL CREATININE-BSD FRML MDRD: 76 ML/MIN/1.73SQ M
GLUCOSE SERPL-MCNC: 103 MG/DL (ref 65–140)
HCT VFR BLD AUTO: 39.8 % (ref 34.8–46.1)
HGB BLD-MCNC: 13 G/DL (ref 11.5–15.4)
IMM GRANULOCYTES # BLD AUTO: 0.03 THOUSAND/UL (ref 0–0.2)
IMM GRANULOCYTES NFR BLD AUTO: 1 % (ref 0–2)
LACTATE SERPL-SCNC: 1.5 MMOL/L (ref 0.5–2)
LYMPHOCYTES # BLD AUTO: 1.2 THOUSANDS/ÂΜL (ref 0.6–4.47)
LYMPHOCYTES NFR BLD AUTO: 19 % (ref 14–44)
MCH RBC QN AUTO: 25.5 PG (ref 26.8–34.3)
MCHC RBC AUTO-ENTMCNC: 32.7 G/DL (ref 31.4–37.4)
MCV RBC AUTO: 78 FL (ref 82–98)
MONOCYTES # BLD AUTO: 0.53 THOUSAND/ÂΜL (ref 0.17–1.22)
MONOCYTES NFR BLD AUTO: 8 % (ref 4–12)
NEUTROPHILS # BLD AUTO: 4.38 THOUSANDS/ÂΜL (ref 1.85–7.62)
NEUTS SEG NFR BLD AUTO: 68 % (ref 43–75)
NRBC BLD AUTO-RTO: 0 /100 WBCS
P AXIS: 75 DEGREES
PLATELET # BLD AUTO: 321 THOUSANDS/UL (ref 149–390)
PMV BLD AUTO: 9.4 FL (ref 8.9–12.7)
POTASSIUM SERPL-SCNC: 3.7 MMOL/L (ref 3.5–5.3)
PR INTERVAL: 146 MS
QRS AXIS: 65 DEGREES
QRSD INTERVAL: 76 MS
QT INTERVAL: 340 MS
QTC INTERVAL: 457 MS
RBC # BLD AUTO: 5.1 MILLION/UL (ref 3.81–5.12)
RSV RNA RESP QL NAA+PROBE: POSITIVE
SARS-COV-2 RNA RESP QL NAA+PROBE: NEGATIVE
SODIUM SERPL-SCNC: 139 MMOL/L (ref 135–147)
T WAVE AXIS: 65 DEGREES
VENTRICULAR RATE: 109 BPM
WBC # BLD AUTO: 6.39 THOUSAND/UL (ref 4.31–10.16)

## 2023-12-11 PROCEDURE — 83605 ASSAY OF LACTIC ACID: CPT | Performed by: INTERNAL MEDICINE

## 2023-12-11 PROCEDURE — 0241U HB NFCT DS VIR RESP RNA 4 TRGT: CPT | Performed by: EMERGENCY MEDICINE

## 2023-12-11 PROCEDURE — 85025 COMPLETE CBC W/AUTO DIFF WBC: CPT | Performed by: EMERGENCY MEDICINE

## 2023-12-11 PROCEDURE — 96365 THER/PROPH/DIAG IV INF INIT: CPT

## 2023-12-11 PROCEDURE — 94760 N-INVAS EAR/PLS OXIMETRY 1: CPT

## 2023-12-11 PROCEDURE — 87040 BLOOD CULTURE FOR BACTERIA: CPT | Performed by: INTERNAL MEDICINE

## 2023-12-11 PROCEDURE — 36415 COLL VENOUS BLD VENIPUNCTURE: CPT | Performed by: EMERGENCY MEDICINE

## 2023-12-11 PROCEDURE — 93005 ELECTROCARDIOGRAM TRACING: CPT

## 2023-12-11 PROCEDURE — 84484 ASSAY OF TROPONIN QUANT: CPT | Performed by: INTERNAL MEDICINE

## 2023-12-11 PROCEDURE — 71045 X-RAY EXAM CHEST 1 VIEW: CPT

## 2023-12-11 PROCEDURE — 94644 CONT INHLJ TX 1ST HOUR: CPT

## 2023-12-11 PROCEDURE — 96375 TX/PRO/DX INJ NEW DRUG ADDON: CPT

## 2023-12-11 PROCEDURE — 83880 ASSAY OF NATRIURETIC PEPTIDE: CPT | Performed by: INTERNAL MEDICINE

## 2023-12-11 PROCEDURE — 99285 EMERGENCY DEPT VISIT HI MDM: CPT

## 2023-12-11 PROCEDURE — 99291 CRITICAL CARE FIRST HOUR: CPT | Performed by: EMERGENCY MEDICINE

## 2023-12-11 PROCEDURE — 94640 AIRWAY INHALATION TREATMENT: CPT

## 2023-12-11 PROCEDURE — 80048 BASIC METABOLIC PNL TOTAL CA: CPT | Performed by: EMERGENCY MEDICINE

## 2023-12-11 PROCEDURE — 99223 1ST HOSP IP/OBS HIGH 75: CPT | Performed by: INTERNAL MEDICINE

## 2023-12-11 RX ORDER — ENOXAPARIN SODIUM 100 MG/ML
40 INJECTION SUBCUTANEOUS DAILY
Status: DISCONTINUED | OUTPATIENT
Start: 2023-12-11 | End: 2023-12-13 | Stop reason: HOSPADM

## 2023-12-11 RX ORDER — ACETAMINOPHEN 325 MG/1
650 TABLET ORAL EVERY 6 HOURS PRN
Status: DISCONTINUED | OUTPATIENT
Start: 2023-12-11 | End: 2023-12-13 | Stop reason: HOSPADM

## 2023-12-11 RX ORDER — CYPROHEPTADINE HYDROCHLORIDE 4 MG/1
4 TABLET ORAL
Status: DISCONTINUED | OUTPATIENT
Start: 2023-12-12 | End: 2023-12-13 | Stop reason: HOSPADM

## 2023-12-11 RX ORDER — LEVALBUTEROL INHALATION SOLUTION 1.25 MG/3ML
1.25 SOLUTION RESPIRATORY (INHALATION)
Status: DISCONTINUED | OUTPATIENT
Start: 2023-12-11 | End: 2023-12-13 | Stop reason: HOSPADM

## 2023-12-11 RX ORDER — FLUOXETINE HYDROCHLORIDE 20 MG/1
40 CAPSULE ORAL DAILY
Status: DISCONTINUED | OUTPATIENT
Start: 2023-12-11 | End: 2023-12-13 | Stop reason: HOSPADM

## 2023-12-11 RX ORDER — PROCHLORPERAZINE MALEATE 10 MG
5 TABLET ORAL EVERY 6 HOURS PRN
Status: DISCONTINUED | OUTPATIENT
Start: 2023-12-11 | End: 2023-12-13 | Stop reason: HOSPADM

## 2023-12-11 RX ORDER — ALBUTEROL SULFATE 2.5 MG/3ML
2.5 SOLUTION RESPIRATORY (INHALATION) EVERY 4 HOURS PRN
Status: DISCONTINUED | OUTPATIENT
Start: 2023-12-11 | End: 2023-12-13 | Stop reason: HOSPADM

## 2023-12-11 RX ORDER — MAGNESIUM SULFATE 1 G/100ML
1 INJECTION INTRAVENOUS ONCE
Status: COMPLETED | OUTPATIENT
Start: 2023-12-11 | End: 2023-12-11

## 2023-12-11 RX ORDER — METHYLPREDNISOLONE SODIUM SUCCINATE 40 MG/ML
40 INJECTION, POWDER, LYOPHILIZED, FOR SOLUTION INTRAMUSCULAR; INTRAVENOUS EVERY 8 HOURS SCHEDULED
Status: DISCONTINUED | OUTPATIENT
Start: 2023-12-11 | End: 2023-12-12

## 2023-12-11 RX ORDER — LORAZEPAM 2 MG/ML
1 INJECTION INTRAMUSCULAR ONCE
Status: COMPLETED | OUTPATIENT
Start: 2023-12-11 | End: 2023-12-11

## 2023-12-11 RX ORDER — FERROUS SULFATE 325(65) MG
325 TABLET ORAL
Status: DISCONTINUED | OUTPATIENT
Start: 2023-12-12 | End: 2023-12-13 | Stop reason: HOSPADM

## 2023-12-11 RX ORDER — CLONAZEPAM 0.5 MG/1
0.5 TABLET ORAL DAILY
Status: DISCONTINUED | OUTPATIENT
Start: 2023-12-11 | End: 2023-12-13 | Stop reason: HOSPADM

## 2023-12-11 RX ORDER — FLUOXETINE HYDROCHLORIDE 20 MG/1
40 CAPSULE ORAL 2 TIMES DAILY
Status: DISCONTINUED | OUTPATIENT
Start: 2023-12-11 | End: 2023-12-11

## 2023-12-11 RX ORDER — ALBUTEROL SULFATE 2.5 MG/3ML
2.5 SOLUTION RESPIRATORY (INHALATION) ONCE
Status: COMPLETED | OUTPATIENT
Start: 2023-12-11 | End: 2023-12-11

## 2023-12-11 RX ORDER — SODIUM CHLORIDE FOR INHALATION 0.9 %
3 VIAL, NEBULIZER (ML) INHALATION
Status: DISCONTINUED | OUTPATIENT
Start: 2023-12-11 | End: 2023-12-11

## 2023-12-11 RX ORDER — LAMOTRIGINE 25 MG/1
50 TABLET ORAL 2 TIMES DAILY
Status: DISCONTINUED | OUTPATIENT
Start: 2023-12-11 | End: 2023-12-13 | Stop reason: HOSPADM

## 2023-12-11 RX ORDER — BUDESONIDE AND FORMOTEROL FUMARATE DIHYDRATE 160; 4.5 UG/1; UG/1
2 AEROSOL RESPIRATORY (INHALATION)
Status: DISCONTINUED | OUTPATIENT
Start: 2023-12-11 | End: 2023-12-11

## 2023-12-11 RX ORDER — LORAZEPAM 2 MG/ML
0.5 INJECTION INTRAMUSCULAR ONCE
Status: DISCONTINUED | OUTPATIENT
Start: 2023-12-11 | End: 2023-12-11

## 2023-12-11 RX ORDER — MONTELUKAST SODIUM 10 MG/1
10 TABLET ORAL
Status: DISCONTINUED | OUTPATIENT
Start: 2023-12-11 | End: 2023-12-13 | Stop reason: HOSPADM

## 2023-12-11 RX ORDER — METHYLPREDNISOLONE SODIUM SUCCINATE 125 MG/2ML
125 INJECTION, POWDER, LYOPHILIZED, FOR SOLUTION INTRAMUSCULAR; INTRAVENOUS ONCE
Status: COMPLETED | OUTPATIENT
Start: 2023-12-11 | End: 2023-12-11

## 2023-12-11 RX ORDER — SODIUM CHLORIDE FOR INHALATION 0.9 %
12 VIAL, NEBULIZER (ML) INHALATION ONCE
Status: COMPLETED | OUTPATIENT
Start: 2023-12-11 | End: 2023-12-11

## 2023-12-11 RX ADMIN — ALBUTEROL SULFATE 2.5 MG: 2.5 SOLUTION RESPIRATORY (INHALATION) at 05:39

## 2023-12-11 RX ADMIN — METHYLPREDNISOLONE SODIUM SUCCINATE 125 MG: 125 INJECTION, POWDER, FOR SOLUTION INTRAMUSCULAR; INTRAVENOUS at 05:44

## 2023-12-11 RX ADMIN — ENOXAPARIN SODIUM 40 MG: 40 INJECTION SUBCUTANEOUS at 11:20

## 2023-12-11 RX ADMIN — ALBUTEROL SULFATE 10 MG: 2.5 SOLUTION RESPIRATORY (INHALATION) at 05:47

## 2023-12-11 RX ADMIN — CLONAZEPAM 0.5 MG: 0.5 TABLET ORAL at 11:20

## 2023-12-11 RX ADMIN — IPRATROPIUM BROMIDE 0.5 MG: 0.5 SOLUTION RESPIRATORY (INHALATION) at 20:37

## 2023-12-11 RX ADMIN — IPRATROPIUM BROMIDE 1 MG: 0.5 SOLUTION RESPIRATORY (INHALATION) at 05:47

## 2023-12-11 RX ADMIN — METHYLPREDNISOLONE SODIUM SUCCINATE 40 MG: 40 INJECTION, POWDER, FOR SOLUTION INTRAMUSCULAR; INTRAVENOUS at 21:12

## 2023-12-11 RX ADMIN — MONTELUKAST 10 MG: 10 TABLET, FILM COATED ORAL at 21:12

## 2023-12-11 RX ADMIN — LORAZEPAM 1 MG: 2 INJECTION INTRAMUSCULAR; INTRAVENOUS at 06:23

## 2023-12-11 RX ADMIN — PROCHLORPERAZINE MALEATE 5 MG: 10 TABLET ORAL at 21:11

## 2023-12-11 RX ADMIN — Medication 12 ML: at 05:47

## 2023-12-11 RX ADMIN — LEVALBUTEROL HYDROCHLORIDE 1.25 MG: 1.25 SOLUTION RESPIRATORY (INHALATION) at 14:29

## 2023-12-11 RX ADMIN — IPRATROPIUM BROMIDE 0.5 MG: 0.5 SOLUTION RESPIRATORY (INHALATION) at 05:39

## 2023-12-11 RX ADMIN — IPRATROPIUM BROMIDE 0.5 MG: 0.5 SOLUTION RESPIRATORY (INHALATION) at 14:29

## 2023-12-11 RX ADMIN — ACETAMINOPHEN 650 MG: 325 TABLET, FILM COATED ORAL at 17:32

## 2023-12-11 RX ADMIN — FLUOXETINE HYDROCHLORIDE 40 MG: 20 CAPSULE ORAL at 14:47

## 2023-12-11 RX ADMIN — METHYLPREDNISOLONE SODIUM SUCCINATE 40 MG: 40 INJECTION, POWDER, FOR SOLUTION INTRAMUSCULAR; INTRAVENOUS at 14:46

## 2023-12-11 RX ADMIN — MAGNESIUM SULFATE HEPTAHYDRATE 1 G: 1 INJECTION, SOLUTION INTRAVENOUS at 05:40

## 2023-12-11 RX ADMIN — LEVALBUTEROL HYDROCHLORIDE 1.25 MG: 1.25 SOLUTION RESPIRATORY (INHALATION) at 20:37

## 2023-12-11 NOTE — Clinical Note
Case was discussed with Dr. Talavera and the patient's Observation status was agreed to be Admission Status: inpatient status to the service of Dr. Talavera .

## 2023-12-11 NOTE — ED NOTES
Pt resting comfortably in bed, this RN provided pt blankets and assisted her with repositioning in bed. Call bell in reach, lights dimmed for comfort. Pt is tolerating BIPAP currently, sating 97%,  sinus tachycardia on monitor. Pt denies having any needs. Will continue to monitor.      Mireya Stapleton RN  12/11/23 4752

## 2023-12-11 NOTE — ASSESSMENT & PLAN NOTE
Acute hypoxic respiratory failure secondary to acute asthma exacerbation with RSV bronchitis-needed BiPAP therapy at the time of presentation for almost an hour.  Currently saturating 98% on 2 L of oxygen.  Received an hour-long nebulizer treatment and IV steroid and IV magnesium in the ED and clinically improved  Will continue nebulizer treatment and IV steroid.  COVID/flu is negative but RSV PCR is positive.  Monitor respiratory status.  Continue supportive care  Will check troponin and BNP level and lactic acid and blood culture.  Meets SIRS criteria.

## 2023-12-11 NOTE — H&P
Formerly [facility]  H&P  Name: Miya Camarillo 58 y.o. female I MRN: [id number]  Unit/Bed#: -01 I Date of Admission: 12/11/2023   Date of Service: 12/11/2023 I Hospital Day: 1      Assessment/Plan   * Acute respiratory failure with hypoxia (HCC)  Assessment & Plan  Acute hypoxic respiratory failure secondary to acute asthma exacerbation with RSV bronchitis-needed BiPAP therapy at the time of presentation for almost an hour.  Currently saturating 98% on 2 L of oxygen.  Received an hour-long nebulizer treatment and IV steroid and IV magnesium in the ED and clinically improved  Will continue nebulizer treatment and IV steroid.  COVID/flu is negative but RSV PCR is positive.  Monitor respiratory status.  Continue supportive care  Will check troponin and BNP level and lactic acid and blood culture.  Meets SIRS criteria.    Moderate asthma with acute exacerbation  Assessment & Plan  Bronchial asthma exacerbation with RSV bronchitis -improved with nebulizer treatment and IV steroid, continue the same, will give fluticasone inhaler.  Chest x-ray does not show any evidence of infiltrate or pulmonary vascular congestion    Anxiety  Assessment & Plan  Depression/anxiety-on Prozac and Lamictal    SIRS (systemic inflammatory response syndrome) (HCC)  Assessment & Plan  SIRS -secondary to acute asthma exacerbation with RSV bronchitis,    H/O bariatric surgery  Assessment & Plan  H/o  of gastric sleeve and gastric bypass surgery in the past-continue supportive care             DVT prophylaxis with Lovenox  VTE Pharmacologic Prophylaxis:   Moderate Risk (Score 3-4) - Pharmacological DVT Prophylaxis Ordered: enoxaparin (Lovenox).  Code Status: Level 1 - Full Code   Discussion with family: Patient declined call to .     Anticipated Length of Stay: Patient will be admitted on an observation basis with an anticipated length of stay of less than 2 midnights secondary to asthma exacerbation with  RSV .    Total Time Spent on Date of Encounter in care of patient: 45 mins. This time was spent on one or more of the following: performing physical exam; counseling and coordination of care; obtaining or reviewing history; documenting in the medical record; reviewing/ordering tests, medications or procedures; communicating with other healthcare professionals and discussing with patient's family/caregivers.    Chief Complaint: SOB     History of Present Illness:  Miya Camarillo is a 58 y.o. female with a PMH of bronchial  asthma, hypertension, depression, presents with complaints of increasing shortness of breath since this early morning around 3 AM.  Patient stated that she woke up with shortness of breath and she had to sit up on the recliner however it got worse by 6 AM she was gasping for air.  Presented to the ED was noted to be tachypneic and tachycardic and in respiratory distress and was placed on BiPAP for almost an hour.  Patient was given IV steroids and nebulizer treatment and IV magnesium and improved.  And then placed on 2 L of oxygen and saturating well.  Patient states she has been dealing with cold-like symptoms for the past few days and had her grandson was visiting over the weekend.  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  She denies of any fever or chills.  RSV  PCR is positive.  Patient denies of any nausea vomiting or abdominal pain.  Denies of any diarrhea or Constipation.  Denies of any chest pain.    Review of Systems:  Review of Systems   Constitutional:  Positive for fatigue.   Respiratory:  Positive for shortness of breath and wheezing.        Past Medical and Surgical History:   Past Medical History:   Diagnosis Date    Anxiety     Asthma     Depression     Hypertension     Migraine     Rapid heart rate        Past Surgical History:   Procedure Laterality Date    BARIATRIC SURGERY      CHOLECYSTECTOMY      GASTRIC BYPASS  2018    also had repair of  "perforated bowel afterwards    HERNIA REPAIR      HYSTERECTOMY      LUNG SURGERY      AK REPAIR FIRST ABDOMINAL WALL HERNIA N/A 01/28/2022    Procedure: OPEN INCISIONAL HERNIA REPAIR WITH MESH;  Surgeon: Bill Baxter MD;  Location: [facility];  Service: General    UPPER GASTROINTESTINAL ENDOSCOPY         Meds/Allergies:  Prior to Admission medications    Medication Sig Start Date End Date Taking? Authorizing Provider   clonazePAM (KlonoPIN) 0.5 mg tablet Take 0.5 mg by mouth daily   Yes Historical Provider, MD   acetaminophen-codeine (TYLENOL with CODEINE #2) 300-15 MG Take 1 tablet by mouth every 8 (eight) hours as needed 3/15/23   Historical Provider, MD   albuterol (PROVENTIL HFA,VENTOLIN HFA) 90 mcg/act inhaler  5/17/23   Historical Provider, MD GALLEGOS 3CC LUER-ARACELIS SYR 25GX1/2\" 25G X 1-1/2\" 3 ML MISC USE FOR TORADOL INTRAMUSCULAR INJECTIONS 8/25/20   Lisa Yen PA-C   budesonide-formoterol (SYMBICORT) 160-4.5 mcg/act inhaler Inhale 2 puffs 2 (two) times a day Morning and evening 11/7/21   Historical Provider, MD   cyproheptadine (PERIACTIN) 4 mg tablet TAKE 1 TABLET(4 MG) BY MOUTH DAILY AT BEDTIME AS NEEDED FOR ALLERGIES OR HEADACHE 9/7/22   Lisa Yen PA-C   FeroSul 325 (65 Fe) MG tablet  3/1/23   Historical Provider, MD   FLUoxetine (PROzac) 40 MG capsule TAKE 2 CAPSULES BY MOUTH EVERY DAY IN THE MORNING 12/8/21   Historical Provider, MD   lamoTRIgine (LaMICtal) 25 mg tablet Take 50 mg by mouth 2 (two) times a day 2/17/23   Historical Provider, MD   pantoprazole (PROTONIX) 40 mg tablet Take 1 tablet (40 mg total) by mouth 2 (two) times a day 2/17/23   Amber Lovett PA-C   sucralfate (CARAFATE) 1 g tablet TAKE 1 TABLET(1 GRAM) BY MOUTH FOUR TIMES DAILY 2/15/23   Aliyah Sadler PA-C   Syringe/Needle, Disp, (SYRINGE 3CC/09AR3-1/4\") 27G X 1-1/4\" 3 ML MISC Use for Toradol intramuscular injections. 1/26/22   Lisa Yen PA-C     I have reviewed home medications with patient personally.    Allergies: " "  Allergies   Allergen Reactions    Penicillins Shortness Of Breath    Latex Hives    Other Other (See Comments)      STEROIDS due to Bypass       Social History:  Marital Status: /Civil Union   Occupation: employed   Patient Pre-hospital Living Situation: Home  Patient Pre-hospital Level of Mobility: walks  Patient Pre-hospital Diet Restrictions: nil  Substance Use History:   Social History     Substance and Sexual Activity   Alcohol Use Yes    Comment: Occ socially     Social History     Tobacco Use   Smoking Status Former    Types: Cigarettes    Quit date:     Years since quittin.9   Smokeless Tobacco Never     Social History     Substance and Sexual Activity   Drug Use Not Currently    Types: Cocaine    Comment: last cocain use        Family History:  Family History   Problem Relation Age of Onset    Cancer Mother         liver    Diabetes Father     Lung cancer Father     No Known Problems Sister     No Known Problems Sister     No Known Problems Sister     No Known Problems Daughter     No Known Problems Maternal Grandmother     No Known Problems Maternal Grandfather     No Known Problems Paternal Grandmother     No Known Problems Paternal Grandfather     No Known Problems Son     No Known Problems Maternal Aunt     No Known Problems Maternal Aunt     No Known Problems Paternal Aunt        Physical Exam:     Vitals:   Blood Pressure: 116/70 (23)  Pulse: 98 (23)  Temperature: 98.4 °F (36.9 °C) (23)  Temp Source: Oral (23)  Respirations: 20 (23)  Height: 5' 2\" (157.5 cm) (23)  Weight - Scale: 79.2 kg (174 lb 8 oz) (23)  SpO2: 98 % (23)    Physical Exam   HEENT-PERRLA, moist oral mucosa  Neck-supple, no JVD elevation   Respiratory-bilateral minimal expiratory rhonchi heard   cardiovascular system-S1, S2 heard, no murmur or gallops or rubs  Abdomen-soft, nontender, no guarding or rigidity, bowel sounds " heard  Extremities-no pedal edema  Peripheral pulses palpable  Musculoskeletal-no contractures  Central nervous system-no acute focal neurological deficit ,no sensory or motor deficit noted.  Skin-no rash noted       Additional Data:     Lab Results:  Results from last 7 days   Lab Units 12/11/23  0544   WBC Thousand/uL 6.39   HEMOGLOBIN g/dL 13.0   HEMATOCRIT % 39.8   PLATELETS Thousands/uL 321   NEUTROS PCT % 68   LYMPHS PCT % 19   MONOS PCT % 8   EOS PCT % 4     Results from last 7 days   Lab Units 12/11/23  0544   SODIUM mmol/L 139   POTASSIUM mmol/L 3.7   CHLORIDE mmol/L 107   CO2 mmol/L 22   BUN mg/dL 10   CREATININE mg/dL 0.85   ANION GAP mmol/L 10   CALCIUM mg/dL 8.7   GLUCOSE RANDOM mg/dL 103                       Lines/Drains:  Invasive Devices       Peripheral Intravenous Line  Duration             Peripheral IV 12/11/23 Right;Ventral (anterior) Forearm <1 day                        Imaging: Personally reviewed the following imaging: chest xray  XR chest 1 view portable   ED Interpretation by Fabricio Shook DO (12/11 0612)   No acute cardiopulmonary findings.          EKG and Other Studies Reviewed on Admission:   EKG: Sinus Tachycardia. HR nonspecific T changes noted, no acute ST-T elevation noted..    ** Please Note: This note has been constructed using a voice recognition system. **

## 2023-12-11 NOTE — ED PROVIDER NOTES
"History  Chief Complaint   Patient presents with    Asthma     Patient arrives to the Ed with complain of asthma exacerbation, Patient states she has been dealing with a cold for the past few days and the wheezing started yesterday morning. Patient took her asthma medication at home with not much relief prior to arrival.      58-year-old female previous history of asthma, anxiety, depression, hypertension, gastric bypass bypass, hysterectomy.    Patient presents for shortness of breath, cough, rhinorrhea.  Symptoms started 2 days ago.  Wheezing for the last day.  Symptoms are consistent with previous asthma exacerbations per the patient.  She has needed to be hospitalized for these in the past though her last hospitalization was many years ago.  She denies fevers.  She has no pain.    Taking her home breathing treatments without relief of symptoms.    Took a covid test which was negative.    Pt doesn't smoke.      Shortness of Breath  Severity:  Severe  Onset quality:  Gradual  Duration:  2 days  Timing:  Constant  Progression:  Worsening  Chronicity:  Recurrent  Relieved by:  Nothing  Worsened by:  Nothing  Ineffective treatments:  Inhaler  Associated symptoms: cough and wheezing        Prior to Admission Medications   Prescriptions Last Dose Informant Patient Reported? Taking?   B-D 3CC LUER-ARACELIS SYR 25GX1/2\" 25G X 1-1/2\" 3 ML MISC   No No   Sig: USE FOR TORADOL INTRAMUSCULAR INJECTIONS   FLUoxetine (PROzac) 40 MG capsule  Self Yes No   Sig: TAKE 2 CAPSULES BY MOUTH EVERY DAY IN THE MORNING   FeroSul 325 (65 Fe) MG tablet   Yes No   Syringe/Needle, Disp, (SYRINGE 3CC/89WF5-4/4\") 27G X 1-1/4\" 3 ML MISC   No No   Sig: Use for Toradol intramuscular injections.   acetaminophen-codeine (TYLENOL with CODEINE #2) 300-15 MG   Yes No   Sig: Take 1 tablet by mouth every 8 (eight) hours as needed   albuterol (PROVENTIL HFA,VENTOLIN HFA) 90 mcg/act inhaler   Yes No   budesonide-formoterol (SYMBICORT) 160-4.5 mcg/act inhaler  " Self Yes No   Sig: Inhale 2 puffs 2 (two) times a day Morning and evening   clonazePAM (KlonoPIN) 0.5 mg tablet 12/10/2023 Self Yes Yes   Sig: Take 0.5 mg by mouth daily   cyproheptadine (PERIACTIN) 4 mg tablet   No No   Sig: TAKE 1 TABLET(4 MG) BY MOUTH DAILY AT BEDTIME AS NEEDED FOR ALLERGIES OR HEADACHE   lamoTRIgine (LaMICtal) 25 mg tablet   Yes No   Sig: Take 50 mg by mouth 2 (two) times a day   pantoprazole (PROTONIX) 40 mg tablet   No No   Sig: Take 1 tablet (40 mg total) by mouth 2 (two) times a day   sucralfate (CARAFATE) 1 g tablet   No No   Sig: TAKE 1 TABLET(1 GRAM) BY MOUTH FOUR TIMES DAILY      Facility-Administered Medications: None       Past Medical History:   Diagnosis Date    Anxiety     Asthma     Depression     Hypertension     Migraine     Rapid heart rate        Past Surgical History:   Procedure Laterality Date    BARIATRIC SURGERY      CHOLECYSTECTOMY      GASTRIC BYPASS  2018    also had repair of perforated bowel afterwards    HERNIA REPAIR      HYSTERECTOMY      LUNG SURGERY      GA REPAIR FIRST ABDOMINAL WALL HERNIA N/A 01/28/2022    Procedure: OPEN INCISIONAL HERNIA REPAIR WITH MESH;  Surgeon: Bill Baxter MD;  Location: [facility];  Service: General    UPPER GASTROINTESTINAL ENDOSCOPY         Family History   Problem Relation Age of Onset    Cancer Mother         liver    Diabetes Father     Lung cancer Father     No Known Problems Sister     No Known Problems Sister     No Known Problems Sister     No Known Problems Daughter     No Known Problems Maternal Grandmother     No Known Problems Maternal Grandfather     No Known Problems Paternal Grandmother     No Known Problems Paternal Grandfather     No Known Problems Son     No Known Problems Maternal Aunt     No Known Problems Maternal Aunt     No Known Problems Paternal Aunt      I have reviewed and agree with the history as documented.    E-Cigarette/Vaping    E-Cigarette Use Never User      E-Cigarette/Vaping Substances     Nicotine No     THC No     CBD No     Flavoring No     Other No     Unknown No      Social History     Tobacco Use    Smoking status: Former     Types: Cigarettes     Quit date:      Years since quittin.9    Smokeless tobacco: Never   Vaping Use    Vaping Use: Never used   Substance Use Topics    Alcohol use: Yes     Comment: Occ socially    Drug use: Not Currently     Types: Cocaine     Comment: last cocain use        Review of Systems   Constitutional:  Positive for fatigue.   HENT:  Positive for rhinorrhea.    Respiratory:  Positive for cough, shortness of breath and wheezing.    All other systems reviewed and are negative.      Physical Exam  Physical Exam  Vitals and nursing note reviewed.   Constitutional:       General: She is not in acute distress.     Appearance: Normal appearance. She is not ill-appearing.   HENT:      Head: Normocephalic and atraumatic.      Right Ear: External ear normal.      Left Ear: External ear normal.      Nose: Nose normal.      Mouth/Throat:      Mouth: Mucous membranes are moist.   Eyes:      General:         Right eye: No discharge.         Left eye: No discharge.      Conjunctiva/sclera: Conjunctivae normal.   Cardiovascular:      Rate and Rhythm: Regular rhythm. Tachycardia present.      Pulses: Normal pulses.      Heart sounds: No murmur heard.  Pulmonary:      Effort: Respiratory distress present.      Breath sounds: Wheezing present. No rales.      Comments: Tachypnea    Abdominal:      General: Abdomen is flat. There is no distension.      Tenderness: There is no abdominal tenderness.   Musculoskeletal:         General: Normal range of motion.      Cervical back: Normal range of motion.   Skin:     General: Skin is warm.      Capillary Refill: Capillary refill takes less than 2 seconds.      Findings: No rash.   Neurological:      General: No focal deficit present.      Mental Status: She is alert. Mental status is at baseline.   Psychiatric:         Mood and  Affect: Mood normal.         Behavior: Behavior normal.         Vital Signs  ED Triage Vitals   Temperature Pulse Respirations Blood Pressure SpO2   12/11/23 0535 12/11/23 0535 12/11/23 0535 12/11/23 0535 12/11/23 0535   98.8 °F (37.1 °C) (!) 108 (!) 33 153/74 96 %      Temp Source Heart Rate Source Patient Position - Orthostatic VS BP Location FiO2 (%)   12/11/23 0909 12/11/23 0559 12/11/23 0559 12/11/23 0559 --   Oral Monitor Sitting Left arm       Pain Score       12/11/23 0954       No Pain           Vitals:    12/11/23 0700 12/11/23 0730 12/11/23 0909 12/11/23 1159   BP: 129/58 118/56 116/70 113/78   Pulse: 105 104 98 97   Patient Position - Orthostatic VS: Lying Lying Lying Lying         Visual Acuity      ED Medications  Medications   lamoTRIgine (LaMICtal) tablet 50 mg (50 mg Oral Not Given 12/11/23 1721)   ferrous sulfate tablet 325 mg (has no administration in time range)   clonazePAM (KlonoPIN) tablet 0.5 mg (0.5 mg Oral Given 12/11/23 1120)   acetaminophen (TYLENOL) tablet 650 mg (650 mg Oral Given 12/11/23 1732)   montelukast (SINGULAIR) tablet 10 mg (has no administration in time range)   albuterol inhalation solution 2.5 mg (has no administration in time range)   levalbuterol (XOPENEX) inhalation solution 1.25 mg (1.25 mg Nebulization Given 12/11/23 1429)   ipratropium (ATROVENT) 0.02 % inhalation solution 0.5 mg (0.5 mg Nebulization Given 12/11/23 1429)   methylPREDNISolone sodium succinate (Solu-MEDROL) injection 40 mg (40 mg Intravenous Given 12/11/23 1446)   enoxaparin (LOVENOX) subcutaneous injection 40 mg (40 mg Subcutaneous Given 12/11/23 1120)   fluticasone (ARNUITY ELLIPTA) 100 MCG/ACT inhaler 1 puff (has no administration in time range)   FLUoxetine (PROzac) capsule 40 mg (40 mg Oral Given 12/11/23 1447)   methylPREDNISolone sodium succinate (Solu-MEDROL) injection 125 mg (125 mg Intravenous Given 12/11/23 2286)   albuterol inhalation solution 10 mg (10 mg Nebulization Given 12/11/23 9546)    ipratropium (ATROVENT) 0.02 % inhalation solution 1 mg (1 mg Nebulization Given 12/11/23 0547)   sodium chloride 0.9 % inhalation solution 12 mL (12 mL Nebulization Given 12/11/23 0547)   magnesium sulfate IVPB (premix) SOLN 1 g (0 g Intravenous Stopped 12/11/23 0600)   albuterol inhalation solution 2.5 mg (2.5 mg Nebulization Given 12/11/23 0539)   ipratropium (ATROVENT) 0.02 % inhalation solution 0.5 mg (0.5 mg Nebulization Given 12/11/23 0539)   LORazepam (ATIVAN) injection 1 mg (1 mg Intravenous Given 12/11/23 0623)       Diagnostic Studies  Results Reviewed       Procedure Component Value Units Date/Time    B-Type Natriuretic Peptide(BNP) [[id number]]  (Normal) Collected: 12/11/23 0544    Lab Status: Final result Specimen: Blood from Arm, Right Updated: 12/11/23 1044     BNP 22 pg/mL     FLU/RSV/COVID - if FLU/RSV clinically relevant [[id number]]  (Abnormal) Collected: 12/11/23 0619    Lab Status: Final result Specimen: Nares from Nose Updated: 12/11/23 0709     SARS-CoV-2 Negative     INFLUENZA A PCR Negative     INFLUENZA B PCR Negative     RSV PCR Positive    Narrative:      FOR PEDIATRIC PATIENTS - copy/paste COVID Guidelines URL to browser: [email or web address]    SARS-CoV-2 assay is a Nucleic Acid Amplification assay intended for the  qualitative detection of nucleic acid from SARS-CoV-2 in nasopharyngeal  swabs. Results are for the presumptive identification of SARS-CoV-2 RNA.    Positive results are indicative of infection with SARS-CoV-2, the virus  causing COVID-19, but do not rule out bacterial infection or co-infection  with other viruses. Laboratories within the [place] and its  territories are required to report all positive results to the appropriate  public health authorities. Negative results do not preclude SARS-CoV-2  infection and should not be used as the sole basis for treatment or other  patient management decisions. Negative  results must be combined with  clinical observations, patient history, and epidemiological information.  This test has not been FDA cleared or approved.    This test has been authorized by FDA under an Emergency Use Authorization  (EUA). This test is only authorized for the duration of time the  declaration that circumstances exist justifying the authorization of the  emergency use of an in vitro diagnostic tests for detection of SARS-CoV-2  virus and/or diagnosis of COVID-19 infection under section 564(b)(1) of  the Act, 21 U.S.C. 360bbb-3(b)(1), unless the authorization is terminated  or revoked sooner. The test has been validated but independent review by FDA  and CLIA is pending.    Test performed using [employer] GeneXpert: This RT-PCR assay targets N2,  a region unique to SARS-CoV-2. A conserved region in the E-gene was chosen  for pan-Sarbecovirus detection which includes SARS-CoV-2.    According to CMS-2020-01-R, this platform meets the definition of high-throughput technology.    Basic metabolic panel [[id number]] Collected: 12/11/23 0544    Lab Status: Final result Specimen: Blood from Arm, Right Updated: 12/11/23 0611     Sodium 139 mmol/L      Potassium 3.7 mmol/L      Chloride 107 mmol/L      CO2 22 mmol/L      ANION GAP 10 mmol/L      BUN 10 mg/dL      Creatinine 0.85 mg/dL      Glucose 103 mg/dL      Calcium 8.7 mg/dL      eGFR 76 ml/min/1.73sq m     Narrative:      National Kidney Disease Foundation guidelines for Chronic Kidney Disease (CKD):     Stage 1 with normal or high GFR (GFR > 90 mL/min/1.73 square meters)    Stage 2 Mild CKD (GFR = 60-89 mL/min/1.73 square meters)    Stage 3A Moderate CKD (GFR = 45-59 mL/min/1.73 square meters)    Stage 3B Moderate CKD (GFR = 30-44 mL/min/1.73 square meters)    Stage 4 Severe CKD (GFR = 15-29 mL/min/1.73 square meters)    Stage 5 End Stage CKD (GFR <15 mL/min/1.73 square meters)  Note: GFR calculation is accurate only with a steady state creatinine    CBC and  differential [[id number]]  (Abnormal) Collected: 12/11/23 0544    Lab Status: Final result Specimen: Blood from Arm, Right Updated: 12/11/23 0551     WBC 6.39 Thousand/uL      RBC 5.10 Million/uL      Hemoglobin 13.0 g/dL      Hematocrit 39.8 %      MCV 78 fL      MCH 25.5 pg      MCHC 32.7 g/dL      RDW 14.6 %      MPV 9.4 fL      Platelets 321 Thousands/uL      nRBC 0 /100 WBCs      Neutrophils Relative 68 %      Immat GRANS % 1 %      Lymphocytes Relative 19 %      Monocytes Relative 8 %      Eosinophils Relative 4 %      Basophils Relative 0 %      Neutrophils Absolute 4.38 Thousands/µL      Immature Grans Absolute 0.03 Thousand/uL      Lymphocytes Absolute 1.20 Thousands/µL      Monocytes Absolute 0.53 Thousand/µL      Eosinophils Absolute 0.23 Thousand/µL      Basophils Absolute 0.02 Thousands/µL                    XR chest 1 view portable   ED Interpretation by Fabricio Shook DO (12/11 0612)   No acute cardiopulmonary findings.      Final Result by Yanet Carpenter MD (12/11 1303)      No acute cardiopulmonary disease.                  Workstation performed: [id number]                    Procedures  CriticalCare Time    Date/Time: 12/11/2023 6:32 AM    Performed by: Fabricio Shook DO  Authorized by: Fabricio Shook DO    Critical care provider statement:     Critical care time (minutes):  30    Critical care start time:  12/11/2023 6:02 AM    Critical care end time:  12/11/2023 6:32 AM    Critical care time was exclusive of:  Separately billable procedures and treating other patients and teaching time    Critical care was necessary to treat or prevent imminent or life-threatening deterioration of the following conditions:  Respiratory failure    Critical care was time spent personally by me on the following activities:  Blood draw for specimens, evaluation of patient's response to treatment, review of old charts, re-evaluation of patient's condition, ordering and review of radiographic studies,  ordering and review of laboratory studies, ventilator management and examination of patient    I assumed direction of critical care for this patient from another provider in my specialty: no             ED Course  ED Course as of 12/11/23 1903   Mon Dec 11, 2023   0547 Procedure Note: EKG  Date/Time: 12/11/23 5:47 AM   Interpreted by: Fabricio Shook  Indications / Diagnosis: Tachycardia  ECG reviewed by me, the ED Provider: yes   The EKG demonstrates:  Rhythm:  sinus tach 109 bpm  Intervals: normal intervals  Axis: normal axis  QRS/Blocks: normal QRS  ST Changes: No acute ST Changes, no STD/VICTORINA.     0621 Moved over to BiPAP due to increased work of breathing.  Patient is exceedingly anxious and asking for medication.  Will give 1 mg of Ativan.   0632 More compliant with the BiPAP.  No longer crying.                                             Medical Decision Making  Patient presenting with URI-like symptoms with wheezing, dyspnea, tachypnea, respiratory distress.    Consistent with asthma exacerbation.  Also will evaluate for pneumonia, JAKUB, electrolyte derangement, anemia, acute intrathoracic abnormality.    No chest pain, abdominal pain or signs of ACS.    No lower extremity edema, Rales, pulmonary edema or signs of heart failure.    Patient critically ill requiring BiPAP.  Signed out to oncoming physician pending plan to trial patient without BiPAP after hour-long nebulizer treatment.      Problems Addressed:  Asthma exacerbation: acute illness or injury  RSV bronchitis: acute illness or injury    Amount and/or Complexity of Data Reviewed  Labs: ordered.  Radiology: ordered and independent interpretation performed.    Risk  Prescription drug management.  Decision regarding hospitalization.             Disposition  Final diagnoses:   RSV bronchitis   Asthma exacerbation     Time reflects when diagnosis was documented in both MDM as applicable and the Disposition within this note       Time User Action Codes  "Description Comment    12/11/2023  2:16 PM EverettJono [J20.5] RSV bronchitis     12/11/2023  7:03 PM Fabricio Shook [J45.901] Asthma exacerbation           ED Disposition       ED Disposition   Admit    Condition   --    Date/Time   Mon Dec 11, 2023  2:15 PM    Comment   Case was discussed with Dr Talavera and the patient's admission status was agreed to be Admission Status: observation status to the service of Dr. Talavera  .               Follow-up Information    None         Current Discharge Medication List        CONTINUE these medications which have NOT CHANGED    Details   clonazePAM (KlonoPIN) 0.5 mg tablet Take 0.5 mg by mouth daily      acetaminophen-codeine (TYLENOL with CODEINE #2) 300-15 MG Take 1 tablet by mouth every 8 (eight) hours as needed      albuterol (PROVENTIL HFA,VENTOLIN HFA) 90 mcg/act inhaler       B-D 3CC LUER-ARACELIS SYR 25GX1/2\" 25G X 1-1/2\" 3 ML MISC USE FOR TORADOL INTRAMUSCULAR INJECTIONS  Qty: 6 each, Refills: 2    Associated Diagnoses: Chronic migraine without aura without status migrainosus, not intractable      budesonide-formoterol (SYMBICORT) 160-4.5 mcg/act inhaler Inhale 2 puffs 2 (two) times a day Morning and evening      cyproheptadine (PERIACTIN) 4 mg tablet TAKE 1 TABLET(4 MG) BY MOUTH DAILY AT BEDTIME AS NEEDED FOR ALLERGIES OR HEADACHE  Qty: 90 tablet, Refills: 3    Comments: **Patient requests 90 days supply**  Associated Diagnoses: Intractable chronic migraine without aura and with status migrainosus      FeroSul 325 (65 Fe) MG tablet       FLUoxetine (PROzac) 40 MG capsule TAKE 2 CAPSULES BY MOUTH EVERY DAY IN THE MORNING      lamoTRIgine (LaMICtal) 25 mg tablet Take 50 mg by mouth 2 (two) times a day      pantoprazole (PROTONIX) 40 mg tablet Take 1 tablet (40 mg total) by mouth 2 (two) times a day  Qty: 60 tablet, Refills: 3    Associated Diagnoses: Anastomotic ulcer      sucralfate (CARAFATE) 1 g tablet TAKE 1 TABLET(1 GRAM) BY MOUTH FOUR TIMES " "DAILY  Qty: 360 tablet, Refills: 0    Comments: **Patient requests 90 days supply**  Associated Diagnoses: Anastomotic ulcer      Syringe/Needle, Disp, (SYRINGE 3CC/26HQ4-3/4\") 27G X 1-1/4\" 3 ML MISC Use for Toradol intramuscular injections.  Qty: 3 each, Refills: 2    Associated Diagnoses: Chronic migraine without aura without status migrainosus, not intractable             No discharge procedures on file.    PDMP Review         Value Time User    PDMP Reviewed  Yes 12/2/2022  2:04 PM Tahira Garcia PA-C            ED Provider  Electronically Signed by             Fabricio Shook DO  12/11/23 1388    "

## 2023-12-11 NOTE — PLAN OF CARE

## 2023-12-11 NOTE — ASSESSMENT & PLAN NOTE
Bronchial asthma exacerbation with RSV bronchitis -improved with nebulizer treatment and IV steroid, continue the same, will give fluticasone inhaler.  Chest x-ray does not show any evidence of infiltrate or pulmonary vascular congestion

## 2023-12-12 LAB
ANION GAP SERPL CALCULATED.3IONS-SCNC: 10 MMOL/L
BUN SERPL-MCNC: 12 MG/DL (ref 5–25)
CALCIUM SERPL-MCNC: 9 MG/DL (ref 8.4–10.2)
CHLORIDE SERPL-SCNC: 105 MMOL/L (ref 96–108)
CO2 SERPL-SCNC: 23 MMOL/L (ref 21–32)
CREAT SERPL-MCNC: 0.69 MG/DL (ref 0.6–1.3)
ERYTHROCYTE [DISTWIDTH] IN BLOOD BY AUTOMATED COUNT: 15.1 % (ref 11.6–15.1)
GFR SERPL CREATININE-BSD FRML MDRD: 96 ML/MIN/1.73SQ M
GLUCOSE P FAST SERPL-MCNC: 145 MG/DL (ref 65–99)
GLUCOSE SERPL-MCNC: 145 MG/DL (ref 65–140)
HCT VFR BLD AUTO: 37.4 % (ref 34.8–46.1)
HGB BLD-MCNC: 11.9 G/DL (ref 11.5–15.4)
HGB RETIC QN AUTO: 24.9 PG (ref 30–38.3)
IMM RETICS NFR: 11.6 % (ref 0–14)
MCH RBC QN AUTO: 25.3 PG (ref 26.8–34.3)
MCHC RBC AUTO-ENTMCNC: 31.8 G/DL (ref 31.4–37.4)
MCV RBC AUTO: 80 FL (ref 82–98)
PLATELET # BLD AUTO: 281 THOUSANDS/UL (ref 149–390)
PMV BLD AUTO: 9.9 FL (ref 8.9–12.7)
POTASSIUM SERPL-SCNC: 3.6 MMOL/L (ref 3.5–5.3)
RBC # BLD AUTO: 4.7 MILLION/UL (ref 3.81–5.12)
RETICS # AUTO: ABNORMAL 10*3/UL (ref 14097–95744)
RETICS # CALC: 1.11 % (ref 0.37–1.87)
SODIUM SERPL-SCNC: 138 MMOL/L (ref 135–147)
VIT B12 SERPL-MCNC: 197 PG/ML (ref 180–914)
WBC # BLD AUTO: 9.19 THOUSAND/UL (ref 4.31–10.16)

## 2023-12-12 PROCEDURE — 99232 SBSQ HOSP IP/OBS MODERATE 35: CPT | Performed by: INTERNAL MEDICINE

## 2023-12-12 PROCEDURE — 85046 RETICYTE/HGB CONCENTRATE: CPT | Performed by: INTERNAL MEDICINE

## 2023-12-12 PROCEDURE — 94760 N-INVAS EAR/PLS OXIMETRY 1: CPT

## 2023-12-12 PROCEDURE — 80048 BASIC METABOLIC PNL TOTAL CA: CPT | Performed by: INTERNAL MEDICINE

## 2023-12-12 PROCEDURE — 94640 AIRWAY INHALATION TREATMENT: CPT

## 2023-12-12 PROCEDURE — 83918 ORGANIC ACIDS TOTAL QUANT: CPT | Performed by: INTERNAL MEDICINE

## 2023-12-12 PROCEDURE — 82607 VITAMIN B-12: CPT | Performed by: INTERNAL MEDICINE

## 2023-12-12 PROCEDURE — 85027 COMPLETE CBC AUTOMATED: CPT | Performed by: INTERNAL MEDICINE

## 2023-12-12 RX ORDER — AMOXICILLIN 250 MG
1 CAPSULE ORAL
Status: DISCONTINUED | OUTPATIENT
Start: 2023-12-12 | End: 2023-12-13 | Stop reason: HOSPADM

## 2023-12-12 RX ORDER — PREDNISONE 20 MG/1
40 TABLET ORAL DAILY
Status: DISCONTINUED | OUTPATIENT
Start: 2023-12-13 | End: 2023-12-13 | Stop reason: HOSPADM

## 2023-12-12 RX ORDER — BENZONATATE 100 MG/1
100 CAPSULE ORAL 3 TIMES DAILY PRN
Status: DISCONTINUED | OUTPATIENT
Start: 2023-12-12 | End: 2023-12-13 | Stop reason: HOSPADM

## 2023-12-12 RX ORDER — MAGNESIUM SULFATE HEPTAHYDRATE 40 MG/ML
2 INJECTION, SOLUTION INTRAVENOUS ONCE
Status: DISCONTINUED | OUTPATIENT
Start: 2023-12-12 | End: 2023-12-12

## 2023-12-12 RX ORDER — METHYLPREDNISOLONE SODIUM SUCCINATE 40 MG/ML
40 INJECTION, POWDER, LYOPHILIZED, FOR SOLUTION INTRAMUSCULAR; INTRAVENOUS EVERY 8 HOURS SCHEDULED
Status: DISCONTINUED | OUTPATIENT
Start: 2023-12-12 | End: 2023-12-13

## 2023-12-12 RX ORDER — PREDNISONE 20 MG/1
40 TABLET ORAL DAILY
Status: DISCONTINUED | OUTPATIENT
Start: 2023-12-12 | End: 2023-12-12

## 2023-12-12 RX ADMIN — SENNOSIDES AND DOCUSATE SODIUM 1 TABLET: 50; 8.6 TABLET ORAL at 17:39

## 2023-12-12 RX ADMIN — PROCHLORPERAZINE MALEATE 5 MG: 10 TABLET ORAL at 21:23

## 2023-12-12 RX ADMIN — CLONAZEPAM 0.5 MG: 0.5 TABLET ORAL at 08:15

## 2023-12-12 RX ADMIN — FLUOXETINE HYDROCHLORIDE 40 MG: 20 CAPSULE ORAL at 08:15

## 2023-12-12 RX ADMIN — LEVALBUTEROL HYDROCHLORIDE 1.25 MG: 1.25 SOLUTION RESPIRATORY (INHALATION) at 08:07

## 2023-12-12 RX ADMIN — METHYLPREDNISOLONE SODIUM SUCCINATE 40 MG: 40 INJECTION, POWDER, FOR SOLUTION INTRAMUSCULAR; INTRAVENOUS at 21:23

## 2023-12-12 RX ADMIN — IPRATROPIUM BROMIDE 0.5 MG: 0.5 SOLUTION RESPIRATORY (INHALATION) at 08:07

## 2023-12-12 RX ADMIN — BENZONATATE 100 MG: 100 CAPSULE ORAL at 21:23

## 2023-12-12 RX ADMIN — ALBUTEROL SULFATE 2.5 MG: 2.5 SOLUTION RESPIRATORY (INHALATION) at 06:20

## 2023-12-12 RX ADMIN — LAMOTRIGINE 50 MG: 25 TABLET ORAL at 08:15

## 2023-12-12 RX ADMIN — CYPROHEPTADINE HYDROCHLORIDE 4 MG: 4 TABLET ORAL at 21:32

## 2023-12-12 RX ADMIN — IPRATROPIUM BROMIDE 0.5 MG: 0.5 SOLUTION RESPIRATORY (INHALATION) at 19:37

## 2023-12-12 RX ADMIN — IPRATROPIUM BROMIDE 0.5 MG: 0.5 SOLUTION RESPIRATORY (INHALATION) at 14:00

## 2023-12-12 RX ADMIN — MONTELUKAST 10 MG: 10 TABLET, FILM COATED ORAL at 21:23

## 2023-12-12 RX ADMIN — LAMOTRIGINE 50 MG: 25 TABLET ORAL at 17:25

## 2023-12-12 RX ADMIN — METHYLPREDNISOLONE SODIUM SUCCINATE 40 MG: 40 INJECTION, POWDER, FOR SOLUTION INTRAMUSCULAR; INTRAVENOUS at 13:51

## 2023-12-12 RX ADMIN — LEVALBUTEROL HYDROCHLORIDE 1.25 MG: 1.25 SOLUTION RESPIRATORY (INHALATION) at 14:00

## 2023-12-12 RX ADMIN — ENOXAPARIN SODIUM 40 MG: 40 INJECTION SUBCUTANEOUS at 08:15

## 2023-12-12 RX ADMIN — CYANOCOBALAMIN TAB 500 MCG 1000 MCG: 500 TAB at 09:10

## 2023-12-12 RX ADMIN — FERROUS SULFATE TAB 325 MG (65 MG ELEMENTAL FE) 325 MG: 325 (65 FE) TAB at 08:15

## 2023-12-12 RX ADMIN — FLUTICASONE FUROATE 1 PUFF: 100 POWDER RESPIRATORY (INHALATION) at 08:58

## 2023-12-12 RX ADMIN — LEVALBUTEROL HYDROCHLORIDE 1.25 MG: 1.25 SOLUTION RESPIRATORY (INHALATION) at 19:37

## 2023-12-12 RX ADMIN — BENZONATATE 100 MG: 100 CAPSULE ORAL at 17:39

## 2023-12-12 RX ADMIN — METHYLPREDNISOLONE SODIUM SUCCINATE 40 MG: 40 INJECTION, POWDER, FOR SOLUTION INTRAMUSCULAR; INTRAVENOUS at 05:29

## 2023-12-12 NOTE — PLAN OF CARE
Problem: Potential for Falls  Goal: Patient will remain free of falls  Description: INTERVENTIONS:  - Educate patient/family on patient safety including physical limitations  - Instruct patient to call for assistance with activity   - Consult OT/PT to assist with strengthening/mobility   - Keep Call bell within reach  - Keep bed low and locked with side rails adjusted as appropriate  - Keep care items and personal belongings within reach  - Initiate and maintain comfort rounds  - Make Fall Risk Sign visible to staff  - Offer Toileting every 2 Hours, in advance of need  - Initiate/Maintain bed and chair alarm  - Apply yellow socks and bracelet for high fall risk patients  - Consider moving patient to room near nurses station  Outcome: Progressing     Problem: PAIN - ADULT  Goal: Verbalizes/displays adequate comfort level or baseline comfort level  Description: Interventions:  - Encourage patient to monitor pain and request assistance  - Assess pain using appropriate pain scale  - Administer analgesics based on type and severity of pain and evaluate response  - Implement non-pharmacological measures as appropriate and evaluate response  - Consider cultural and social influences on pain and pain management  - Notify physician/advanced practitioner if interventions unsuccessful or patient reports new pain  Outcome: Progressing     Problem: INFECTION - ADULT  Goal: Absence or prevention of progression during hospitalization  Description: INTERVENTIONS:  - Assess and monitor for signs and symptoms of infection  - Monitor lab/diagnostic results  - Administer medications as ordered  - Instruct and encourage patient and family to use good hand hygiene technique  - Identify and instruct in appropriate isolation precautions for identified infection/condition  Outcome: Progressing  Goal: Absence of fever/infection during neutropenic period  Description: INTERVENTIONS:  - Monitor WBC    Outcome: Progressing     Problem: SAFETY  ADULT  Goal: Patient will remain free of falls  Description: INTERVENTIONS:  - Educate patient/family on patient safety including physical limitations  - Instruct patient to call for assistance with activity   - Consult OT/PT to assist with strengthening/mobility   - Keep Call bell within reach  - Keep bed low and locked with side rails adjusted as appropriate  - Keep care items and personal belongings within reach  - Initiate and maintain comfort rounds  - Make Fall Risk Sign visible to staff  - Offer Toileting every 2 Hours, in advance of need  - Initiate/Maintain bed and chair alarm  - Apply yellow socks and bracelet for high fall risk patients  - Consider moving patient to room near nurses station  Outcome: Progressing  Goal: Maintain or return to baseline ADL function  Description: INTERVENTIONS:  -  Assess patient's ability to carry out ADLs; assess patient's baseline for ADL function and identify physical deficits which impact ability to perform ADLs (bathing, care of mouth/teeth, toileting, grooming, dressing, etc.)  - Assess/evaluate cause of self-care deficits   - Assess range of motion  - Assess patient's mobility; develop plan if impaired  - Assess patient's need for assistive devices and provide as appropriate  - Encourage maximum independence but intervene and supervise when necessary  - Involve family in performance of ADLs  - Assess for home care needs following discharge   - Consider OT consult to assist with ADL evaluation and planning for discharge  - Provide patient education as appropriate  Outcome: Progressing  Goal: Maintains/Returns to pre admission functional level  Description: INTERVENTIONS:  - Perform AM-PAC 6 Click Basic Mobility/ Daily Activity assessment daily.  - Set and communicate daily mobility goal to care team and patient/family/caregiver.   - Collaborate with rehabilitation services on mobility goals if consulted  - Perform Range of Motion 3 times a day.  - Stand patient 3 times  a day  - Ambulate patient 3 times a day  - Out of bed to chair 3 times a day   - Out of bed for meals 3 times a day  - Out of bed for toileting  - Record patient progress and toleration of activity level   Outcome: Progressing     Problem: DISCHARGE PLANNING  Goal: Discharge to home or other facility with appropriate resources  Description: INTERVENTIONS:  - Identify barriers to discharge w/patient and caregiver  - Arrange for needed discharge resources and transportation as appropriate  - Identify discharge learning needs (meds, wound care, etc.)  - Arrange for interpretive services to assist at discharge as needed  - Refer to Case Management Department for coordinating discharge planning if the patient needs post-hospital services based on physician/advanced practitioner order or complex needs related to functional status, cognitive ability, or social support system  Outcome: Progressing     Problem: Knowledge Deficit  Goal: Patient/family/caregiver demonstrates understanding of disease process, treatment plan, medications, and discharge instructions  Description: Complete learning assessment and assess knowledge base.  Interventions:  - Provide teaching at level of understanding  - Provide teaching via preferred learning methods  Outcome: Progressing     Problem: RESPIRATORY - ADULT  Goal: Achieves optimal ventilation and oxygenation  Description: INTERVENTIONS:  - Assess for changes in respiratory status  - Assess for changes in mentation and behavior  - Position to facilitate oxygenation and minimize respiratory effort  - Oxygen administered by appropriate delivery if ordered  - Initiate smoking cessation education as indicated  - Encourage broncho-pulmonary hygiene including cough, deep breathe, Incentive Spirometry  - Assess the need for suctioning and aspirate as needed  - Assess and instruct to report SOB or any respiratory difficulty  - Respiratory Therapy support as indicated  Outcome: Progressing

## 2023-12-12 NOTE — ASSESSMENT & PLAN NOTE
H/o  of gastric sleeve and gastric bypass surgery in the past-continue supportive care  Denies any abdominal discomfort diarrhea constipation

## 2023-12-12 NOTE — ASSESSMENT & PLAN NOTE
Acute hypoxic respiratory failure secondary to acute asthma exacerbation with RSV bronchitis-needed BiPAP therapy at the time of presentation for almost an hour.  Currently saturating 98% on 2 L of oxygen.  Received an hour-long nebulizer treatment and IV steroid and IV magnesium in the ED and clinically improved  Will continue nebulizer treatment and IV steroid.  COVID/flu is negative but RSV PCR is positive.  Monitor respiratory status.  Continue supportive care  12/12/23  Patient currently comfortable on room air does still have some wheezing on exam we will continue on IV steroid and plan to transition to oral tomorrow

## 2023-12-12 NOTE — UTILIZATION REVIEW
Initial Clinical Review    Admission: Date/Time/Statement:   Admission Orders (From admission, onward)       Ordered        12/11/23 0833  Place in Observation  Once            12/11/23 0823  INPATIENT ADMISSION  Once,   Status:  Canceled                          Orders Placed This Encounter   Procedures    Place in Observation     Standing Status:   Standing     Number of Occurrences:   1     Order Specific Question:   Level of Care     Answer:   Med Surg [16]     ED Arrival Information       Expected   -    Arrival   12/11/2023 05:23    Acuity   Emergent              Means of arrival   Walk-In    Escorted by   Self    Service   Hospitalist    Admission type   Emergency              Arrival complaint   breathing problem             Chief Complaint   Patient presents with    Asthma     Patient arrives to the Ed with complain of asthma exacerbation, Patient states she has been dealing with a cold for the past few days and the wheezing started yesterday morning. Patient took her asthma medication at home with not much relief prior to arrival.        Initial Presentation: 58 y.o. female with PMHx of moderate asthma, HTN, anxiety/depression, h/o bariatric surgery presented to ED as a walk-in with increasing sob since ~3AM . States she woke up ~ 3 AM with sob , had to sit up on a recliner however her sob worsened by 6 AM so presented to the ED. On presentation pt noted to be tachypneic, tachycardic in respiratory distress, placed on BiPAP. Given IV steroids, neb tx and IV mag with improvement and weaned to 2L NC. (+) RSV. CXR judi for acute findings.  EKG with ST, nonspecific T changes noted, no acute ST-T elevation noted.   Admitted under observation to MS unit with acute hypoxic respiratory failure secondary to acute asthma exacerbation with RSV bronchitis -- continue IV steroids, nebs. Check Troponin and BNP level, lactic acid and blood cxs. Continue pta po meds.    Date: 12/12   Day 2: pt weaned to RA. Still with mild  sob, + wheezing on exam. Continue IV steroids today, nebs.  Supportive care. SCDs.      ED Triage Vitals   Temperature Pulse Respirations Blood Pressure SpO2   12/11/23 0535 12/11/23 0535 12/11/23 0535 12/11/23 0535 12/11/23 0535   98.8 °F (37.1 °C) (!) 108 (!) 33 153/74 96 %      Temp Source Heart Rate Source Patient Position - Orthostatic VS BP Location FiO2 (%)   12/11/23 0909 12/11/23 0559 12/11/23 0559 12/11/23 0559 12/12/23 1400   Oral Monitor Sitting Left arm 21      Pain Score       12/11/23 0954       No Pain          Wt Readings from Last 1 Encounters:   12/11/23 79.2 kg (174 lb 8 oz)     Additional Vital Signs:   Date/Time Temp Pulse Resp BP MAP (mmHg) SpO2 FiO2 (%) Calculated FIO2 (%) - Nasal Cannula Nasal Cannula O2 Flow Rate (L/min) O2 Device O2 Interface Device Patient Position - Orthostatic VS   12/12/23 1509 98.2 °F (36.8 °C) 104 16 123/67 -- 98 % -- -- -- -- -- Sitting   12/12/23 1400 -- -- -- -- -- 97 % 21 -- -- None (Room air) -- --   12/12/23 0902 -- -- -- -- -- 97 % -- -- -- -- -- --   12/12/23 0808 -- -- -- -- -- 98 % -- -- -- None (Room air) -- --   12/12/23 0725 98 °F (36.7 °C) 99 16 113/70 -- 97 % -- -- -- None (Room air) -- Lying   12/12/23 0620 -- -- -- -- -- 96 % -- -- -- -- -- --   12/12/23 0310 98 °F (36.7 °C) 83 18 139/78 -- 96 % -- -- -- -- -- Lying   12/11/23 2348 98.2 °F (36.8 °C) 86 18 135/78 98 97 % -- -- -- -- -- Lying   12/11/23 2043 -- -- -- -- -- 96 % -- -- -- -- -- --   12/11/23 1951 98.2 °F (36.8 °C) 95 18 120/76 90 -- -- -- -- -- -- Lying   12/11/23 1429 -- -- -- -- -- 97 % -- 24 1 L/min Nasal cannula -- --   12/11/23 1159 98.8 °F (37.1 °C) 97 20 113/78 -- 98 % -- -- -- -- -- Lying   12/11/23 1005 -- -- -- -- -- 97 % -- 24 1 L/min Nasal cannula -- --   12/11/23 0954 -- -- -- -- -- -- -- 28 2 L/min Nasal cannula -- --   12/11/23 0909 98.4 °F (36.9 °C) 98 20 116/70 -- 98 % -- -- -- -- -- Lying   12/11/23 0730 -- 104 22 118/56 80 98 % -- -- -- BiPAP -- Lying   12/11/23 0700  -- 105 23 Abnormal  129/58 85 96 % -- -- -- BiPAP -- Lying   12/11/23 0648 -- -- 27 Abnormal  -- -- -- -- -- -- -- -- --   12/11/23 0645 -- 115 Abnormal  30 Abnormal  118/59 85 100 % -- -- -- -- -- --   12/11/23 0630 -- 112 Abnormal  37 Abnormal  124/65 88 100 % -- -- -- BiPAP -- --   12/11/23 0626 -- -- -- -- -- -- -- -- -- BiPAP Face mask --   12/11/23 0600 -- 111 Abnormal  30 Abnormal  142/65 94 98 % -- -- -- -- -- --   12/11/23 0559 -- 105 27 Abnormal  128/68 -- 97 % -- -- -- Aerosol mask -- Sitting   12/11/23 0556 -- -- -- -- -- -- -- -- -- Aerosol mask -- --   12/11/23 0547 -- -- -- -- -- 96 % -- -- -- None (Room air) -- --   12/11/23 0535 98.8 °F (37.1 °C) 108 Abnormal  33 Abnormal  153/74 -- 96 % -- -- -- None (Room air) -- --     Pertinent Labs/Diagnostic Test Results:   XR chest 1 view portable   ED Interpretation by Fabricio Shook DO (12/11 0612)   No acute cardiopulmonary findings.      Final Result by Yanet Carpenter MD (12/11 1303)      No acute cardiopulmonary disease.        Results from last 7 days   Lab Units 12/11/23 0619   SARS-COV-2  Negative     Results from last 7 days   Lab Units 12/12/23 0426 12/11/23 0544   WBC Thousand/uL 9.19 6.39   HEMOGLOBIN g/dL 11.9 13.0   HEMATOCRIT % 37.4 39.8   PLATELETS Thousands/uL 281 321   NEUTROS ABS Thousands/µL  --  4.38     Results from last 7 days   Lab Units 12/12/23 0426   RETIC CT ABS  53,100   RETIC CT PCT % 1.11     Results from last 7 days   Lab Units 12/12/23 0426 12/11/23  0544   SODIUM mmol/L 138 139   POTASSIUM mmol/L 3.6 3.7   CHLORIDE mmol/L 105 107   CO2 mmol/L 23 22   ANION GAP mmol/L 10 10   BUN mg/dL 12 10   CREATININE mg/dL 0.69 0.85   EGFR ml/min/1.73sq m 96 76   CALCIUM mg/dL 9.0 8.7     Results from last 7 days   Lab Units 12/12/23  0426 12/11/23  0544   GLUCOSE RANDOM mg/dL 145* 103     Results from last 7 days   Lab Units 12/11/23  1104   HS TNI 0HR ng/L 3     Results from last 7 days   Lab Units 12/11/23  1104   LACTIC  ACID mmol/L 1.5     Results from last 7 days   Lab Units 12/11/23  0544   BNP pg/mL 22     Results from last 7 days   Lab Units 12/11/23  0619   INFLUENZA A PCR  Negative   INFLUENZA B PCR  Negative   RSV PCR  Positive*     Results from last 7 days   Lab Units 12/11/23  1106 12/11/23  1105   BLOOD CULTURE  No Growth at 24 hrs. No Growth at 24 hrs.         ED Treatment:   Medication Administration from 12/11/2023 0523 to 12/11/2023 0853         Date/Time Order Dose Route Action     12/11/2023 0544 EST methylPREDNISolone sodium succinate (Solu-MEDROL) injection 125 mg 125 mg Intravenous Given     12/11/2023 0547 EST albuterol inhalation solution 10 mg 10 mg Nebulization Given     12/11/2023 0547 EST ipratropium (ATROVENT) 0.02 % inhalation solution 1 mg 1 mg Nebulization Given     12/11/2023 0547 EST sodium chloride 0.9 % inhalation solution 12 mL 12 mL Nebulization Given     12/11/2023 0540 EST magnesium sulfate IVPB (premix) SOLN 1 g 1 g Intravenous New Bag     12/11/2023 0539 EST albuterol inhalation solution 2.5 mg 2.5 mg Nebulization Given     12/11/2023 0539 EST ipratropium (ATROVENT) 0.02 % inhalation solution 0.5 mg 0.5 mg Nebulization Given     12/11/2023 0623 EST LORazepam (ATIVAN) injection 1 mg 1 mg Intravenous Given       Past Medical History:   Diagnosis Date    Anxiety     Asthma     Depression     Hypertension     Migraine     Rapid heart rate      Present on Admission:   Anxiety      Admitting Diagnosis: Asthma [J45.909]  Age/Sex: 58 y.o. female  Admission Orders:  Scheduled Medications:  clonazePAM, 0.5 mg, Oral, Daily  cyanocobalamin, 1,000 mcg, Oral, Daily  cyproheptadine, 4 mg, Oral, HS  enoxaparin, 40 mg, Subcutaneous, Daily  ferrous sulfate, 325 mg, Oral, Daily With Breakfast  FLUoxetine, 40 mg, Oral, Daily  fluticasone, 1 puff, Inhalation, Daily  ipratropium, 0.5 mg, Nebulization, TID  lamoTRIgine, 50 mg, Oral, BID  levalbuterol, 1.25 mg, Nebulization, TID  methylPREDNISolone sodium succinate,  40 mg, Intravenous, Q8H MICHEAL  montelukast, 10 mg, Oral, HS  [START ON 12/13/2023] predniSONE, 40 mg, Oral, Daily      Continuous IV Infusions:     PRN Meds:  acetaminophen, 650 mg, Oral, Q6H PRN  albuterol, 2.5 mg, Nebulization, Q4H PRN  prochlorperazine, 5 mg, Oral, Q6H PRN          Network Utilization Review Department  ATTENTION: Please call with any questions or concerns to [phone] and carefully listen to the prompts so that you are directed to the right person. All voicemails are confidential.   For Discharge needs, contact Care Management DC Support Team at [phone] opt. 2  Send all requests for admission clinical reviews, approved or denied determinations and any other requests to dedicated fax number below belonging to the [place] where the patient is receiving treatment. List of dedicated fax numbers for the Facilities:  FACILITY NAME UR FAX NUMBER   ADMISSION DENIALS (Administrative/Medical Necessity) [phone]   DISCHARGE SUPPORT TEAM (NETWORK) [phone]   PARENT CHILD HEALTH (Maternity/NICU/Pediatrics) [phone]   [facility] [phone]   [facility] [phone]   [facility] [phone]   [facility] [phone]   [facility] [phone]   [facility] [phone]   [facility] [phone]   [facility] [phone]   [facility] [phone]   [facility] [phone]   [facility] [phone]

## 2023-12-12 NOTE — PROGRESS NOTES
[facility]  Progress Note  Name: Miya Camarillo I  MRN: [id number]  Unit/Bed#: -01 I Date of Admission: 12/11/2023   Date of Service: 12/12/2023 I Hospital Day: 1    Assessment/Plan   SIRS (systemic inflammatory response syndrome) ([facility])  Assessment & Plan  SIRS -secondary to acute asthma exacerbation with RSV bronchitis,    Moderate asthma with acute exacerbation  Assessment & Plan  Bronchial asthma exacerbation with RSV bronchitis -improved with nebulizer treatment and IV steroid, continue the same, will give fluticasone inhaler.  Chest x-ray does not show any evidence of infiltrate or pulmonary vascular congestion    H/O bariatric surgery  Assessment & Plan  H/o  of gastric sleeve and gastric bypass surgery in the past-continue supportive care  Denies any abdominal discomfort diarrhea constipation    Anxiety  Assessment & Plan  Depression/anxiety-on Prozac and Lamictal  Patient denies any acute complaints in this regard    * Acute respiratory failure with hypoxia (HCC)  Assessment & Plan  Acute hypoxic respiratory failure secondary to acute asthma exacerbation with RSV bronchitis-needed BiPAP therapy at the time of presentation for almost an hour.  Currently saturating 98% on 2 L of oxygen.  Received an hour-long nebulizer treatment and IV steroid and IV magnesium in the ED and clinically improved  Will continue nebulizer treatment and IV steroid.  COVID/flu is negative but RSV PCR is positive.  Monitor respiratory status.  Continue supportive care  12/12/23  Patient currently comfortable on room air does still have some wheezing on exam we will continue on IV steroid and plan to transition to oral tomorrow               VTE Pharmacologic Prophylaxis:   High Risk (Score >/= 5) - Pharmacological DVT Prophylaxis Ordered: enoxaparin (Lovenox). Sequential Compression Devices Ordered.    Mobility:   Basic Mobility Inpatient Raw Score: 23  JH-HLM Goal: 7: Walk 25 feet or more  JH-HLM  Achieved: 7: Walk 25 feet or more  HLM Goal achieved. Continue to encourage appropriate mobility.    Patient Centered Rounds: I performed bedside rounds with nursing staff today.   Discussions with Specialists or Other Care Team Provider: n/a    Education and Discussions with Family / Patient: Updated  () via phone.    Total Time Spent on Date of Encounter in care of patient: 35 mins. This time was spent on one or more of the following: performing physical exam; counseling and coordination of care; obtaining or reviewing history; documenting in the medical record; reviewing/ordering tests, medications or procedures; communicating with other healthcare professionals and discussing with patient's family/caregivers.    Current Length of Stay: 1 day(s)  Current Patient Status: Observation   Certification Statement: The patient will continue to require additional inpatient hospital stay due to rsv asthma.  Discharge Plan: Anticipate discharge tomorrow to home.    Code Status: Level 1 - Full Code    Subjective:   Patient denies any acute complaints apart from mild shortness of breath and wheezing currently    Objective:     Vitals:   Temp (24hrs), Av.3 °F (36.8 °C), Min:98 °F (36.7 °C), Max:98.8 °F (37.1 °C)    Temp:  [98 °F (36.7 °C)-98.8 °F (37.1 °C)] 98 °F (36.7 °C)  HR:  [83-99] 99  Resp:  [16-20] 16  BP: (113-139)/(70-78) 113/70  SpO2:  [96 %-98 %] 98 %  Body mass index is 31.92 kg/m².     Input and Output Summary (last 24 hours):     Intake/Output Summary (Last 24 hours) at 2023 0855  Last data filed at 2023 1101  Gross per 24 hour   Intake 473 ml   Output --   Net 473 ml       Physical Exam:   Physical Exam  Vitals and nursing note reviewed.   Constitutional:       General: She is not in acute distress.     Appearance: She is well-developed. She is not toxic-appearing or diaphoretic.   HENT:      Head: Normocephalic and atraumatic.   Eyes:      General: No scleral icterus.      Conjunctiva/sclera: Conjunctivae normal.   Cardiovascular:      Rate and Rhythm: Normal rate and regular rhythm.      Heart sounds: No murmur heard.     No friction rub. No gallop.   Pulmonary:      Effort: Pulmonary effort is normal. No respiratory distress.      Breath sounds: No stridor. Wheezing present. No rhonchi or rales.   Chest:      Chest wall: No tenderness.   Abdominal:      General: There is no distension.      Palpations: Abdomen is soft. There is no mass.      Tenderness: There is no abdominal tenderness. There is no guarding or rebound.      Hernia: No hernia is present.   Musculoskeletal:         General: No swelling or tenderness.      Cervical back: Neck supple.      Right lower leg: No edema.      Left lower leg: No edema.   Skin:     General: Skin is warm and dry.      Capillary Refill: Capillary refill takes less than 2 seconds.   Neurological:      Mental Status: She is alert and oriented to person, place, and time.   Psychiatric:         Mood and Affect: Mood normal.          Additional Data:     Labs:  Results from last 7 days   Lab Units 12/12/23  0426 12/11/23  0544   WBC Thousand/uL 9.19 6.39   HEMOGLOBIN g/dL 11.9 13.0   HEMATOCRIT % 37.4 39.8   PLATELETS Thousands/uL 281 321   NEUTROS PCT %  --  68   LYMPHS PCT %  --  19   MONOS PCT %  --  8   EOS PCT %  --  4     Results from last 7 days   Lab Units 12/12/23  0426   SODIUM mmol/L 138   POTASSIUM mmol/L 3.6   CHLORIDE mmol/L 105   CO2 mmol/L 23   BUN mg/dL 12   CREATININE mg/dL 0.69   ANION GAP mmol/L 10   CALCIUM mg/dL 9.0   GLUCOSE RANDOM mg/dL 145*                 Results from last 7 days   Lab Units 12/11/23  1104   LACTIC ACID mmol/L 1.5       Lines/Drains:  Invasive Devices       Peripheral Intravenous Line  Duration             Peripheral IV 12/11/23 Right;Ventral (anterior) Forearm 1 day                          Imaging: No pertinent imaging reviewed.    Recent Cultures (last 7 days):   Results from last 7 days   Lab Units  12/11/23  1106 12/11/23  1105   BLOOD CULTURE  Received in Microbiology Lab. Culture in Progress. Received in Microbiology Lab. Culture in Progress.       Last 24 Hours Medication List:   Current Facility-Administered Medications   Medication Dose Route Frequency Provider Last Rate    acetaminophen  650 mg Oral Q6H PRN Sumi Talavera MD      albuterol  2.5 mg Nebulization Q4H PRN Sumi Talavera MD      clonazePAM  0.5 mg Oral Daily Sumi Talavera MD      cyanocobalamin  1,000 mcg Oral Daily Eugenio Rodrigues DO      cyproheptadine  4 mg Oral HS Niki Lee MD      enoxaparin  40 mg Subcutaneous Daily Sumi Talavera MD      ferrous sulfate  325 mg Oral Daily With Breakfast Sumi Talavera MD      FLUoxetine  40 mg Oral Daily Sumi Talavera MD      fluticasone  1 puff Inhalation Daily Sumi Talavera MD      ipratropium  0.5 mg Nebulization TID Sumi Talavera MD      lamoTRIgine  50 mg Oral BID Sumi Talavera MD      levalbuterol  1.25 mg Nebulization TID Sumi Talavera MD      methylPREDNISolone sodium succinate  40 mg Intravenous Q8H [facility] Eugenio Rodrigues DO      montelukast  10 mg Oral HS Sumi Talavera MD      [START ON 12/13/2023] predniSONE  40 mg Oral Daily Eugenio Rodrigues DO      prochlorperazine  5 mg Oral Q6H PRN Niki Lee MD          Today, Patient Was Seen By: Eugenio Rodrigues DO    **Please Note: This note may have been constructed using a voice recognition system.**

## 2023-12-13 VITALS
SYSTOLIC BLOOD PRESSURE: 123 MMHG | HEIGHT: 62 IN | HEART RATE: 81 BPM | BODY MASS INDEX: 32.11 KG/M2 | TEMPERATURE: 98.2 F | WEIGHT: 174.5 LBS | RESPIRATION RATE: 20 BRPM | OXYGEN SATURATION: 99 % | DIASTOLIC BLOOD PRESSURE: 72 MMHG

## 2023-12-13 PROBLEM — J45.901 ASTHMA EXACERBATION: Status: ACTIVE | Noted: 2023-12-13

## 2023-12-13 PROBLEM — J20.5 RSV BRONCHITIS: Status: ACTIVE | Noted: 2023-12-13

## 2023-12-13 LAB
ANION GAP SERPL CALCULATED.3IONS-SCNC: 9 MMOL/L
BUN SERPL-MCNC: 14 MG/DL (ref 5–25)
CALCIUM SERPL-MCNC: 8.9 MG/DL (ref 8.4–10.2)
CHLORIDE SERPL-SCNC: 105 MMOL/L (ref 96–108)
CO2 SERPL-SCNC: 25 MMOL/L (ref 21–32)
CREAT SERPL-MCNC: 0.75 MG/DL (ref 0.6–1.3)
DME PARACHUTE DELIVERY DATE REQUESTED: NORMAL
DME PARACHUTE ITEM DESCRIPTION: NORMAL
DME PARACHUTE ORDER STATUS: NORMAL
DME PARACHUTE SUPPLIER NAME: NORMAL
DME PARACHUTE SUPPLIER PHONE: NORMAL
GFR SERPL CREATININE-BSD FRML MDRD: 88 ML/MIN/1.73SQ M
GLUCOSE SERPL-MCNC: 143 MG/DL (ref 65–140)
POTASSIUM SERPL-SCNC: 3.7 MMOL/L (ref 3.5–5.3)
SODIUM SERPL-SCNC: 139 MMOL/L (ref 135–147)

## 2023-12-13 PROCEDURE — 94760 N-INVAS EAR/PLS OXIMETRY 1: CPT

## 2023-12-13 PROCEDURE — 80048 BASIC METABOLIC PNL TOTAL CA: CPT | Performed by: INTERNAL MEDICINE

## 2023-12-13 PROCEDURE — 94640 AIRWAY INHALATION TREATMENT: CPT

## 2023-12-13 PROCEDURE — 99239 HOSP IP/OBS DSCHRG MGMT >30: CPT | Performed by: INTERNAL MEDICINE

## 2023-12-13 RX ORDER — LEVALBUTEROL INHALATION SOLUTION 1.25 MG/3ML
1.25 SOLUTION RESPIRATORY (INHALATION)
Qty: 30 ML | Refills: 2 | Status: SHIPPED | OUTPATIENT
Start: 2023-12-13

## 2023-12-13 RX ORDER — PREDNISONE 20 MG/1
40 TABLET ORAL DAILY
Qty: 10 TABLET | Refills: 0 | Status: SHIPPED | OUTPATIENT
Start: 2023-12-14 | End: 2023-12-19

## 2023-12-13 RX ORDER — BENZONATATE 100 MG/1
100 CAPSULE ORAL 3 TIMES DAILY PRN
Qty: 20 CAPSULE | Refills: 0 | Status: SHIPPED | OUTPATIENT
Start: 2023-12-13

## 2023-12-13 RX ORDER — CYANOCOBALAMIN 1000 UG/ML
1000 INJECTION, SOLUTION INTRAMUSCULAR; SUBCUTANEOUS ONCE
Status: COMPLETED | OUTPATIENT
Start: 2023-12-13 | End: 2023-12-13

## 2023-12-13 RX ADMIN — METHYLPREDNISOLONE SODIUM SUCCINATE 40 MG: 40 INJECTION, POWDER, FOR SOLUTION INTRAMUSCULAR; INTRAVENOUS at 05:38

## 2023-12-13 RX ADMIN — CLONAZEPAM 0.5 MG: 0.5 TABLET ORAL at 08:49

## 2023-12-13 RX ADMIN — FLUTICASONE FUROATE 1 PUFF: 100 POWDER RESPIRATORY (INHALATION) at 07:35

## 2023-12-13 RX ADMIN — CYANOCOBALAMIN 1000 MCG: 1000 INJECTION, SOLUTION INTRAMUSCULAR; SUBCUTANEOUS at 10:39

## 2023-12-13 RX ADMIN — ENOXAPARIN SODIUM 40 MG: 40 INJECTION SUBCUTANEOUS at 08:48

## 2023-12-13 RX ADMIN — IPRATROPIUM BROMIDE 0.5 MG: 0.5 SOLUTION RESPIRATORY (INHALATION) at 07:35

## 2023-12-13 RX ADMIN — LAMOTRIGINE 50 MG: 25 TABLET ORAL at 08:49

## 2023-12-13 RX ADMIN — FLUOXETINE HYDROCHLORIDE 40 MG: 20 CAPSULE ORAL at 08:49

## 2023-12-13 RX ADMIN — CYANOCOBALAMIN TAB 500 MCG 1000 MCG: 500 TAB at 08:49

## 2023-12-13 RX ADMIN — LEVALBUTEROL HYDROCHLORIDE 1.25 MG: 1.25 SOLUTION RESPIRATORY (INHALATION) at 07:35

## 2023-12-13 RX ADMIN — PREDNISONE 40 MG: 20 TABLET ORAL at 08:49

## 2023-12-13 NOTE — CASE MANAGEMENT
Case Management Discharge Planning Note    Patient name Miya Camarillo  Location /-01 MRN [id number]  : 1965 Date 2023       Current Admission Date: 2023  Current Admission Diagnosis:Acute respiratory failure with hypoxia (HCC)   Patient Active Problem List    Diagnosis Date Noted    Asthma exacerbation 2023    RSV bronchitis 2023    Acute respiratory failure with hypoxia (HCC) 2023    Moderate asthma with acute exacerbation 2023    SIRS (systemic inflammatory response syndrome) (HCC) 2023    H/O bariatric surgery 02/10/2023    Gastroesophageal reflux disease 02/10/2023    Abdominal pain 2022    History of colon polyps 2022    Change in bowel habits 2022    Incisional hernia 2022    Mild asthma 2022    History of hysterectomy 2022    Anxiety 2022    HTN (hypertension) 2022    B12 deficiency 2020    Vitamin D deficiency 2020    Chronic migraine without aura 2019    Peripheral neuropathy 2019      LOS (days): 1  Geometric Mean LOS (GMLOS) (days):   Days to GMLOS:     OBJECTIVE:  Risk of Unplanned Readmission Score: 13.61         Current admission status: Observation   Preferred Pharmacy:   [id number] [place] [address] [address]  Phone: [phone] Fax: [phone]    Primary Care Provider: Suzanne Chappell MD    Primary Insurance: [employer]  Secondary Insurance:     DISCHARGE DETAILS:    Discharge planning discussed with:: Patient  Freedom of Choice: Yes  Comments - Freedom of Choice: Patient returning to her apartment with her family (son and ), patient was provided with Nebulizer at beside by CM which was ordered through [employer] and approved for delivery, CM notified patient she will be called to pay copay of $2.60, patient agreed.  No other needs.  CM contacted  family/caregiver?: No- see comments  Were Treatment Team discharge recommendations reviewed with patient/caregiver?: Yes  Did patient/caregiver verbalize understanding of patient care needs?: Yes  Were patient/caregiver advised of the risks associated with not following Treatment Team discharge recommendations?: Yes         Requested Home Health Care         Is the patient interested in HHC at discharge?: No    DME Referral Provided  Referral made for DME?: Yes  DME referral completed for the following items:: Nebulizer  [facility]    Other Referral/Resources/Interventions Provided:  Interventions: DME  Referral Comments: Nebulizer         Treatment Team Recommendation: Home  Discharge Destination Plan:: Home  Transport at Discharge : Self                       Accompanied by: Alone

## 2023-12-13 NOTE — ASSESSMENT & PLAN NOTE
Acute hypoxic respiratory failure secondary to acute asthma exacerbation with RSV bronchitis-needed BiPAP therapy at the time of presentation for almost an hour.  Currently saturating 98% on 2 L of oxygen.  Received an hour-long nebulizer treatment and IV steroid and IV magnesium in the ED and clinically improved  Will continue nebulizer treatment and IV steroid.  COVID/flu is negative but RSV PCR is positive.  Monitor respiratory status.  Continue supportive care  12/13/23  Patient currently comfortable on room air, denies any shortness of breath will be discharged on short course of prednisone, refilled nebulizers

## 2023-12-13 NOTE — PLAN OF CARE
Problem: PAIN - ADULT  Goal: Verbalizes/displays adequate comfort level or baseline comfort level  Description: Interventions:  - Encourage patient to monitor pain and request assistance  - Assess pain using appropriate pain scale  - Administer analgesics based on type and severity of pain and evaluate response  - Implement non-pharmacological measures as appropriate and evaluate response  - Consider cultural and social influences on pain and pain management  - Notify physician/advanced practitioner if interventions unsuccessful or patient reports new pain  Outcome: Progressing     Problem: INFECTION - ADULT  Goal: Absence or prevention of progression during hospitalization  Description: INTERVENTIONS:  - Assess and monitor for signs and symptoms of infection  - Monitor lab/diagnostic results  - Monitor all insertion sites, i.e. indwelling lines, tubes, and drains  - Monitor endotracheal if appropriate and nasal secretions for changes in amount and color  - [place] appropriate cooling/warming therapies per order  - Administer medications as ordered  - Instruct and encourage patient and family to use good hand hygiene technique  - Identify and instruct in appropriate isolation precautions for identified infection/condition  Outcome: Progressing     Problem: DISCHARGE PLANNING  Goal: Discharge to home or other facility with appropriate resources  Description: INTERVENTIONS:  - Identify barriers to discharge w/patient and caregiver  - Arrange for needed discharge resources and transportation as appropriate  - Identify discharge learning needs (meds, wound care, etc.)  - Arrange for interpretive services to assist at discharge as needed  - Refer to Case Management Department for coordinating discharge planning if the patient needs post-hospital services based on physician/advanced practitioner order or complex needs related to functional status, cognitive ability, or social support system  Outcome: Progressing      Problem: Knowledge Deficit  Goal: Patient/family/caregiver demonstrates understanding of disease process, treatment plan, medications, and discharge instructions  Description: Complete learning assessment and assess knowledge base.  Interventions:  - Provide teaching at level of understanding  - Provide teaching via preferred learning methods  Outcome: Progressing     Problem: RESPIRATORY - ADULT  Goal: Achieves optimal ventilation and oxygenation  Description: INTERVENTIONS:  - Assess for changes in respiratory status  - Assess for changes in mentation and behavior  - Position to facilitate oxygenation and minimize respiratory effort  - Oxygen administered by appropriate delivery if ordered  - Initiate smoking cessation education as indicated  - Encourage broncho-pulmonary hygiene including cough, deep breathe, Incentive Spirometry  - Assess the need for suctioning and aspirate as needed  - Assess and instruct to report SOB or any respiratory difficulty  - Respiratory Therapy support as indicated  Outcome: Progressing

## 2023-12-13 NOTE — PLAN OF CARE
Problem: INFECTION - ADULT  Goal: Absence or prevention of progression during hospitalization  Description: INTERVENTIONS:  - Assess and monitor for signs and symptoms of infection  - Monitor lab/diagnostic results  - Monitor all insertion sites, i.e. indwelling lines, tubes, and drains  - Monitor endotracheal if appropriate and nasal secretions for changes in amount and color  - [place] appropriate cooling/warming therapies per order  - Administer medications as ordered  - Instruct and encourage patient and family to use good hand hygiene technique  - Identify and instruct in appropriate isolation precautions for identified infection/condition  Outcome: Progressing  Goal: Absence of fever/infection during neutropenic period  Description: INTERVENTIONS:  - Monitor WBC    Outcome: Progressing     Problem: DISCHARGE PLANNING  Goal: Discharge to home or other facility with appropriate resources  Description: INTERVENTIONS:  - Identify barriers to discharge w/patient and caregiver  - Arrange for needed discharge resources and transportation as appropriate  - Identify discharge learning needs (meds, wound care, etc.)  - Arrange for interpretive services to assist at discharge as needed  - Refer to Case Management Department for coordinating discharge planning if the patient needs post-hospital services based on physician/advanced practitioner order or complex needs related to functional status, cognitive ability, or social support system  Outcome: Progressing     Problem: Knowledge Deficit  Goal: Patient/family/caregiver demonstrates understanding of disease process, treatment plan, medications, and discharge instructions  Description: Complete learning assessment and assess knowledge base.  Interventions:  - Provide teaching at level of understanding  - Provide teaching via preferred learning methods  Outcome: Progressing

## 2023-12-13 NOTE — DISCHARGE SUMMARY
Critical access hospital  Discharge- Miya Camarillo 1965, 58 y.o. female MRN: [id number]  Unit/Bed#: [facility] Encounter: [id number]  Primary Care Provider: Suzanne Chappell MD   Date and time admitted to hospital: 12/11/2023  5:29 AM    SIRS (systemic inflammatory response syndrome) (HCC)  Assessment & Plan  SIRS -secondary to acute asthma exacerbation with RSV bronchitis,    Moderate asthma with acute exacerbation  Assessment & Plan  Bronchial asthma exacerbation with RSV bronchitis -improved with nebulizer treatment and IV steroid, continue the same, will give fluticasone inhaler.  Chest x-ray does not show any evidence of infiltrate or pulmonary vascular congestion    H/O bariatric surgery  Assessment & Plan  H/o  of gastric sleeve and gastric bypass surgery in the past-continue supportive care  Denies any abdominal discomfort diarrhea constipation    Anxiety  Assessment & Plan  Depression/anxiety-on Prozac and Lamictal  Patient denies any acute complaints in this regard    * Acute respiratory failure with hypoxia (HCC)  Assessment & Plan  Acute hypoxic respiratory failure secondary to acute asthma exacerbation with RSV bronchitis-needed BiPAP therapy at the time of presentation for almost an hour.  Currently saturating 98% on 2 L of oxygen.  Received an hour-long nebulizer treatment and IV steroid and IV magnesium in the ED and clinically improved  Will continue nebulizer treatment and IV steroid.  COVID/flu is negative but RSV PCR is positive.  Monitor respiratory status.  Continue supportive care  12/13/23  Patient currently comfortable on room air, denies any shortness of breath will be discharged on short course of prednisone, refilled nebulizers        Medical Problems       Resolved Problems  Date Reviewed: 12/13/2023   None       Discharging Physician / Practitioner: Eugenio Rodrigues DO  PCP: Suzanne Chappell MD  Admission Date:   Admission Orders (From admission, onward)        "Ordered        12/11/23 0833  Place in Observation  Once            12/11/23 0823  INPATIENT ADMISSION  Once,   Status:  Canceled                          Discharge Date: 12/13/23    Consultations During Hospital Stay:  none    Procedures Performed:   none    Significant Findings / Test Results:       Incidental Findings:      I reviewed the above mentioned incidental findings with the patient and/or family and they expressed understanding.    Test Results Pending at Discharge (will require follow up):        Outpatient Tests Requested:      Complications:      Reason for Admission: sob    Hospital Course:   Miya Camarillo is a 58 y.o. female patient who originally presented to the hospital on 12/11/2023 due to shortness of breath in the setting of asthma exacerbation with RSV infection.  She was comfortable on room air and was discharged on a short course of prednisone.        Please see above list of diagnoses and related plan for additional information.     Condition at Discharge: stable    Discharge Day Visit / Exam:   Subjective: Patient denies any acute complaints ambulating without any dyspnea on exertion or shortness of breath at rest  Vitals: Blood Pressure: 123/72 (12/13/23 0747)  Pulse: 81 (12/13/23 0747)  Temperature: 98.2 °F (36.8 °C) (12/13/23 0747)  Temp Source: Axillary (12/13/23 0747)  Respirations: 20 (12/13/23 0747)  Height: 5' 2\" (157.5 cm) (12/11/23 0909)  Weight - Scale: 79.2 kg (174 lb 8 oz) (12/11/23 0909)  SpO2: 99 % (12/13/23 0755)  Exam:   Physical Exam  Vitals and nursing note reviewed.   Constitutional:       General: She is not in acute distress.     Appearance: She is well-developed. She is not toxic-appearing or diaphoretic.   HENT:      Head: Normocephalic and atraumatic.   Eyes:      General: No scleral icterus.     Conjunctiva/sclera: Conjunctivae normal.   Cardiovascular:      Rate and Rhythm: Normal rate and regular rhythm.      Heart sounds: No murmur heard.     No friction " rub. No gallop.   Pulmonary:      Effort: Pulmonary effort is normal. No respiratory distress.      Breath sounds: No stridor. No wheezing, rhonchi or rales.   Chest:      Chest wall: No tenderness.   Abdominal:      General: There is no distension.      Palpations: Abdomen is soft. There is no mass.      Tenderness: There is no abdominal tenderness. There is no guarding or rebound.      Hernia: No hernia is present.   Musculoskeletal:         General: No swelling or tenderness.      Cervical back: Neck supple.      Right lower leg: No edema.      Left lower leg: No edema.   Skin:     General: Skin is warm and dry.      Capillary Refill: Capillary refill takes less than 2 seconds.   Neurological:      Mental Status: She is alert and oriented to person, place, and time.   Psychiatric:         Mood and Affect: Mood normal.          Discussion with Family: Patient declined call to .     Discharge instructions/Information to patient and family:   See after visit summary for information provided to patient and family.      Provisions for Follow-Up Care:  See after visit summary for information related to follow-up care and any pertinent home health orders.      Mobility at time of Discharge:   Basic Mobility Inpatient Raw Score: 24  JH-HLM Goal: 8: Walk 250 feet or more  JH-HLM Achieved: 8: Walk 250 feet ot more  HLM Goal achieved. Continue to encourage appropriate mobility.     Disposition:   Home    Planned Readmission: no     Discharge Statement:  I spent 35 minutes discharging the patient. This time was spent on the day of discharge. I had direct contact with the patient on the day of discharge. Greater than 50% of the total time was spent examining patient, answering all patient questions, arranging and discussing plan of care with patient as well as directly providing post-discharge instructions.  Additional time then spent on discharge activities.    Discharge Medications:  See after visit summary  for reconciled discharge medications provided to patient and/or family.      **Please Note: This note may have been constructed using a voice recognition system**

## 2023-12-16 LAB
BACTERIA BLD CULT: NORMAL
BACTERIA BLD CULT: NORMAL

## 2023-12-17 LAB — METHYLMALONATE SERPL-SCNC: 366 NMOL/L (ref 0–378)

## 2023-12-18 NOTE — ASSESSMENT & PLAN NOTE
Preventive therapy for headaches:   - cyproheptadine 4 mg at bedtime   - Increase Lamictal to 50 mg in am and 75 mg at bedtime for 7 days then increase to 50 mg in am and 100 mg at bedtime  Abortive therapy for headaches:   - At onset of migraine, take Nurtec 75 mg    Limit of 1 tab in 24 hours  - Status post gastric bypass in February of 2019  - To help break her current headache will have her take Compazine (prochlorperazine) 10 mg  As needed
No
burning urination

## 2023-12-19 LAB
DME PARACHUTE DELIVERY DATE ACTUAL: NORMAL
DME PARACHUTE DELIVERY DATE REQUESTED: NORMAL
DME PARACHUTE ITEM DESCRIPTION: NORMAL
DME PARACHUTE ORDER STATUS: NORMAL
DME PARACHUTE SUPPLIER NAME: NORMAL
DME PARACHUTE SUPPLIER PHONE: NORMAL

## 2024-02-11 PROBLEM — J20.5 RSV BRONCHITIS: Status: RESOLVED | Noted: 2023-12-13 | Resolved: 2024-02-11

## 2024-02-18 ENCOUNTER — APPOINTMENT (EMERGENCY)
Dept: CT IMAGING | Facility: HOSPITAL | Age: 59
End: 2024-02-18
Payer: COMMERCIAL

## 2024-02-18 ENCOUNTER — HOSPITAL ENCOUNTER (EMERGENCY)
Facility: HOSPITAL | Age: 59
Discharge: HOME | End: 2024-02-18
Attending: EMERGENCY MEDICINE
Payer: COMMERCIAL

## 2024-02-18 VITALS
TEMPERATURE: 97.6 F | HEART RATE: 82 BPM | OXYGEN SATURATION: 98 % | SYSTOLIC BLOOD PRESSURE: 135 MMHG | RESPIRATION RATE: 16 BRPM | DIASTOLIC BLOOD PRESSURE: 60 MMHG

## 2024-02-18 DIAGNOSIS — F10.929 ALCOHOL INTOXICATION (HCC): Primary | ICD-10-CM

## 2024-02-18 PROCEDURE — 99284 EMERGENCY DEPT VISIT MOD MDM: CPT

## 2024-02-18 PROCEDURE — 99284 EMERGENCY DEPT VISIT MOD MDM: CPT | Performed by: EMERGENCY MEDICINE

## 2024-02-18 PROCEDURE — 72125 CT NECK SPINE W/O DYE: CPT

## 2024-02-18 PROCEDURE — 70450 CT HEAD/BRAIN W/O DYE: CPT

## 2024-02-19 NOTE — ED NOTES
Spoke to daughter and she will be ordering an uber to come get patient.      Holley Cortez V RN  02/18/24 1932

## 2024-02-19 NOTE — ED PROVIDER NOTES
"History  Chief Complaint   Patient presents with    Alcohol Intoxication     Brought in by EMS, pt was at local bar when she couldn't get up. Pt fell, unable to say how she fell, arrives in C-collar. Pt denies blood thinners, unsure if lost consciousness, states she had \"3-4 girly drinks, the strong ones\". Pt will not answer any other questions     58-year-old female presents with her  both intoxicated.  They were having dinner and drinking at a restaurant patient reports drinking approximately 4 strong girly drinks.  When she stood up she fell down and may have hit her head.  The staff at the restaurant called EMS.  Patient was placed in c-collar.  Patient currently has no complaints but also does not recall the incident.        Prior to Admission Medications   Prescriptions Last Dose Informant Patient Reported? Taking?   B-D 3CC LUER-ARACELIS SYR 25GX1/2\" 25G X 1-1/2\" 3 ML MISC   No No   Sig: USE FOR TORADOL INTRAMUSCULAR INJECTIONS   FLUoxetine (PROzac) 40 MG capsule  Self Yes No   Sig: TAKE 2 CAPSULES BY MOUTH EVERY DAY IN THE MORNING   FeroSul 325 (65 Fe) MG tablet   Yes No   Syringe/Needle, Disp, (SYRINGE 3CC/97ZX1-3/4\") 27G X 1-1/4\" 3 ML MISC   No No   Sig: Use for Toradol intramuscular injections.   acetaminophen-codeine (TYLENOL with CODEINE #2) 300-15 MG   Yes No   Sig: Take 1 tablet by mouth every 8 (eight) hours as needed   albuterol (PROVENTIL HFA,VENTOLIN HFA) 90 mcg/act inhaler   Yes No   benzonatate (TESSALON PERLES) 100 mg capsule   No No   Sig: Take 1 capsule (100 mg total) by mouth 3 (three) times a day as needed for cough   budesonide-formoterol (SYMBICORT) 160-4.5 mcg/act inhaler  Self Yes No   Sig: Inhale 2 puffs 2 (two) times a day Morning and evening   clonazePAM (KlonoPIN) 0.5 mg tablet  Self Yes No   Sig: Take 0.5 mg by mouth daily   cyanocobalamin (VITAMIN B-12) 1000 MCG tablet   No No   Sig: Take 1 tablet (1,000 mcg total) by mouth daily   cyproheptadine (PERIACTIN) 4 mg tablet   No No "   Sig: TAKE 1 TABLET(4 MG) BY MOUTH DAILY AT BEDTIME AS NEEDED FOR ALLERGIES OR HEADACHE   ipratropium (ATROVENT) 0.02 % nebulizer solution   No No   Sig: Take 2.5 mL (0.5 mg total) by nebulization 3 (three) times a day   lamoTRIgine (LaMICtal) 25 mg tablet   Yes No   Sig: Take 50 mg by mouth 2 (two) times a day   levalbuterol (XOPENEX) 1.25 mg/3 mL nebulizer solution   No No   Sig: Take 3 mL (1.25 mg total) by nebulization 3 (three) times a day   pantoprazole (PROTONIX) 40 mg tablet   No No   Sig: Take 1 tablet (40 mg total) by mouth 2 (two) times a day   sucralfate (CARAFATE) 1 g tablet   No No   Sig: TAKE 1 TABLET(1 GRAM) BY MOUTH FOUR TIMES DAILY      Facility-Administered Medications: None       Past Medical History:   Diagnosis Date    Anxiety     Asthma     Depression     Hypertension     Migraine     Rapid heart rate        Past Surgical History:   Procedure Laterality Date    BARIATRIC SURGERY      CHOLECYSTECTOMY      GASTRIC BYPASS  2018    also had repair of perforated bowel afterwards    HERNIA REPAIR      HYSTERECTOMY      LUNG SURGERY      [place] REPAIR FIRST ABDOMINAL WALL HERNIA N/A 01/28/2022    Procedure: OPEN INCISIONAL HERNIA REPAIR WITH MESH;  Surgeon: Bill Baxter MD;  Location: [facility];  Service: General    UPPER GASTROINTESTINAL ENDOSCOPY         Family History   Problem Relation Age of Onset    Cancer Mother         liver    Diabetes Father     Lung cancer Father     No Known Problems Sister     No Known Problems Sister     No Known Problems Sister     No Known Problems Daughter     No Known Problems Maternal Grandmother     No Known Problems Maternal Grandfather     No Known Problems Paternal Grandmother     No Known Problems Paternal Grandfather     No Known Problems Son     No Known Problems Maternal Aunt     No Known Problems Maternal Aunt     No Known Problems Paternal Aunt      I have reviewed and agree with the history as documented.    E-Cigarette/Vaping    E-Cigarette Use Never  User      E-Cigarette/Vaping Substances    Nicotine No     THC No     CBD No     Flavoring No     Other No     Unknown No      Social History     Tobacco Use    Smoking status: Former     Current packs/day: 0.00     Types: Cigarettes     Quit date:      Years since quittin.1    Smokeless tobacco: Never   Vaping Use    Vaping status: Never Used   Substance Use Topics    Alcohol use: Yes     Comment: Occ socially    Drug use: Not Currently     Types: Cocaine     Comment: last cocain use        Review of Systems   All other systems reviewed and are negative.      Physical Exam  Physical Exam  Constitutional:       General: She is not in acute distress.     Appearance: She is not toxic-appearing.      Comments: Sleeping, arouses to voice   HENT:      Head: Normocephalic.      Nose: Nose normal.      Mouth/Throat:      Mouth: Mucous membranes are moist.   Eyes:      Conjunctiva/sclera: Conjunctivae normal.      Pupils: Pupils are equal, round, and reactive to light.   Neck:      Comments: C-collar in place, no midline spinal tenderness  Cardiovascular:      Rate and Rhythm: Normal rate and regular rhythm.   Pulmonary:      Effort: Pulmonary effort is normal.   Abdominal:      Palpations: Abdomen is soft.      Tenderness: There is no abdominal tenderness.   Musculoskeletal:         General: Normal range of motion.   Skin:     General: Skin is warm and dry.   Neurological:      General: No focal deficit present.      Comments: Intoxicated but answering questions appropriately, does not recall some events         Vital Signs  ED Triage Vitals [24 1658]   Temperature Pulse Respirations Blood Pressure SpO2   97.6 °F (36.4 °C) 82 16 135/60 98 %      Temp Source Heart Rate Source Patient Position - Orthostatic VS BP Location FiO2 (%)   Oral -- -- -- --      Pain Score       --           Vitals:    24 1658   BP: 135/60   Pulse: 82         Visual Acuity      ED Medications  Medications - No data to  display    Diagnostic Studies  Results Reviewed       None                   CT cervical spine without contrast   Final Result by Jose Maria Jimenez MD (02/18 1849)      No cervical spine fracture or traumatic malalignment.                  Workstation performed: [id number]         CT head without contrast   Final Result by Jose Maria Jimenez MD (02/18 1847)      No acute intracranial abnormality.                  Workstation performed: [id number]                    Procedures  Procedures         ED Course         58-year-old female presenting with alcohol intoxication after a fall at a restaurant.  Patient is well-appearing with normal vitals on arrival.  She does appear intoxicated.  She is sleepy but arouses to voice.  No evidence of trauma on exam.  CT scan of the head and C-spine was obtained both showing no acute disease.  Patient allowed to sober.  Was observed to be able to ambulate steadily.  We spoke with the patient's adult daughter who is comfortable watching both her parents.  She called an [employer] to give her mom a ride home.  Stable at time of discharge.                                    Medical Decision Making  Amount and/or Complexity of Data Reviewed  Radiology: ordered.             Disposition  Final diagnoses:   Alcohol intoxication (HCC)     Time reflects when diagnosis was documented in both MDM as applicable and the Disposition within this note       Time User Action Codes Description Comment    2/18/2024  7:13 PM Trina Pak Add [F10.929] Alcohol intoxication (HCC)           ED Disposition       ED Disposition   Discharge    Condition   Stable    Date/Time   Sun Feb 18, 2024  7:13 PM    Comment   Miya Camarillo discharge to home/self care.                   Follow-up Information    None         Discharge Medication List as of 2/18/2024  7:13 PM        CONTINUE these medications which have NOT CHANGED    Details   acetaminophen-codeine (TYLENOL with CODEINE #2) 300-15 MG Take 1 tablet by  "mouth every 8 (eight) hours as needed, Starting Wed 3/15/2023, Historical Med      albuterol (PROVENTIL HFA,VENTOLIN HFA) 90 mcg/act inhaler Starting Wed 5/17/2023, Historical Med      B-D 3CC LUER-ARACELIS SYR 25GX1/2\" 25G X 1-1/2\" 3 ML MISC USE FOR TORADOL INTRAMUSCULAR INJECTIONS, Normal      benzonatate (TESSALON PERLES) 100 mg capsule Take 1 capsule (100 mg total) by mouth 3 (three) times a day as needed for cough, Starting Wed 12/13/2023, Normal      budesonide-formoterol (SYMBICORT) 160-4.5 mcg/act inhaler Inhale 2 puffs 2 (two) times a day Morning and evening, Starting Sun 11/7/2021, Historical Med      clonazePAM (KlonoPIN) 0.5 mg tablet Take 0.5 mg by mouth daily, Historical Med      cyanocobalamin (VITAMIN B-12) 1000 MCG tablet Take 1 tablet (1,000 mcg total) by mouth daily, Starting Thu 12/14/2023, Normal      cyproheptadine (PERIACTIN) 4 mg tablet TAKE 1 TABLET(4 MG) BY MOUTH DAILY AT BEDTIME AS NEEDED FOR ALLERGIES OR HEADACHE, Normal      FeroSul 325 (65 Fe) MG tablet Starting Wed 3/1/2023, Historical Med      FLUoxetine (PROzac) 40 MG capsule TAKE 2 CAPSULES BY MOUTH EVERY DAY IN THE MORNING, Historical Med      ipratropium (ATROVENT) 0.02 % nebulizer solution Take 2.5 mL (0.5 mg total) by nebulization 3 (three) times a day, Starting Wed 12/13/2023, Normal      lamoTRIgine (LaMICtal) 25 mg tablet Take 50 mg by mouth 2 (two) times a day, Starting Fri 2/17/2023, Historical Med      levalbuterol (XOPENEX) 1.25 mg/3 mL nebulizer solution Take 3 mL (1.25 mg total) by nebulization 3 (three) times a day, Starting Wed 12/13/2023, Normal      pantoprazole (PROTONIX) 40 mg tablet Take 1 tablet (40 mg total) by mouth 2 (two) times a day, Starting Fri 2/17/2023, Normal      sucralfate (CARAFATE) 1 g tablet TAKE 1 TABLET(1 GRAM) BY MOUTH FOUR TIMES DAILY, Normal      Syringe/Needle, Disp, (SYRINGE 3CC/97UW1-8/4\") 27G X 1-1/4\" 3 ML MISC Use for Toradol intramuscular injections., Normal             No discharge " procedures on file.    PDMP Review         Value Time User    PDMP Reviewed  Yes 12/2/2022  2:04 PM Tahira Garcia PA-C            ED Provider  Electronically Signed by             Trina Pak MD  02/18/24 3205

## 2024-03-15 ENCOUNTER — HOSPITAL ENCOUNTER (OUTPATIENT)
Facility: HOSPITAL | Age: 59
Setting detail: OBSERVATION
Discharge: HOME | End: 2024-03-16
Attending: EMERGENCY MEDICINE | Admitting: INTERNAL MEDICINE
Payer: COMMERCIAL

## 2024-03-15 ENCOUNTER — APPOINTMENT (EMERGENCY)
Dept: CT IMAGING | Facility: HOSPITAL | Age: 59
End: 2024-03-15
Payer: COMMERCIAL

## 2024-03-15 DIAGNOSIS — R11.10 INTRACTABLE VOMITING: Primary | ICD-10-CM

## 2024-03-15 PROBLEM — R11.2 NAUSEA & VOMITING: Status: ACTIVE | Noted: 2024-03-15

## 2024-03-15 LAB
ALBUMIN SERPL BCP-MCNC: 3.7 G/DL (ref 3.5–5)
ALP SERPL-CCNC: 92 U/L (ref 34–104)
ALT SERPL W P-5'-P-CCNC: 11 U/L (ref 7–52)
ANION GAP SERPL CALCULATED.3IONS-SCNC: 9 MMOL/L (ref 4–13)
AST SERPL W P-5'-P-CCNC: 10 U/L (ref 13–39)
BASOPHILS # BLD AUTO: 0.05 THOUSANDS/ÂΜL (ref 0–0.1)
BASOPHILS NFR BLD AUTO: 1 % (ref 0–1)
BILIRUB SERPL-MCNC: 0.25 MG/DL (ref 0.2–1)
BUN SERPL-MCNC: 9 MG/DL (ref 5–25)
CALCIUM SERPL-MCNC: 9 MG/DL (ref 8.4–10.2)
CHLORIDE SERPL-SCNC: 104 MMOL/L (ref 96–108)
CO2 SERPL-SCNC: 26 MMOL/L (ref 21–32)
CREAT SERPL-MCNC: 0.8 MG/DL (ref 0.6–1.3)
EOSINOPHIL # BLD AUTO: 0.1 THOUSAND/ÂΜL (ref 0–0.61)
EOSINOPHIL NFR BLD AUTO: 1 % (ref 0–6)
ERYTHROCYTE [DISTWIDTH] IN BLOOD BY AUTOMATED COUNT: 15.1 % (ref 11.6–15.1)
GFR SERPL CREATININE-BSD FRML MDRD: 82 ML/MIN/1.73SQ M
GLUCOSE SERPL-MCNC: 110 MG/DL (ref 65–140)
HCT VFR BLD AUTO: 37.9 % (ref 34.8–46.1)
HGB BLD-MCNC: 11.6 G/DL (ref 11.5–15.4)
IMM GRANULOCYTES # BLD AUTO: 0.05 THOUSAND/UL (ref 0–0.2)
IMM GRANULOCYTES NFR BLD AUTO: 1 % (ref 0–2)
LIPASE SERPL-CCNC: 26 U/L (ref 11–82)
LYMPHOCYTES # BLD AUTO: 1.41 THOUSANDS/ÂΜL (ref 0.6–4.47)
LYMPHOCYTES NFR BLD AUTO: 18 % (ref 14–44)
MCH RBC QN AUTO: 24.4 PG (ref 26.8–34.3)
MCHC RBC AUTO-ENTMCNC: 30.6 G/DL (ref 31.4–37.4)
MCV RBC AUTO: 80 FL (ref 82–98)
MONOCYTES # BLD AUTO: 0.48 THOUSAND/ÂΜL (ref 0.17–1.22)
MONOCYTES NFR BLD AUTO: 6 % (ref 4–12)
NEUTROPHILS # BLD AUTO: 5.9 THOUSANDS/ÂΜL (ref 1.85–7.62)
NEUTS SEG NFR BLD AUTO: 73 % (ref 43–75)
NRBC BLD AUTO-RTO: 0 /100 WBCS
PLATELET # BLD AUTO: 369 THOUSANDS/UL (ref 149–390)
PMV BLD AUTO: 9.1 FL (ref 8.9–12.7)
POTASSIUM SERPL-SCNC: 4 MMOL/L (ref 3.5–5.3)
PROT SERPL-MCNC: 6.9 G/DL (ref 6.4–8.4)
RBC # BLD AUTO: 4.75 MILLION/UL (ref 3.81–5.12)
SODIUM SERPL-SCNC: 139 MMOL/L (ref 135–147)
WBC # BLD AUTO: 7.99 THOUSAND/UL (ref 4.31–10.16)

## 2024-03-15 PROCEDURE — 85025 COMPLETE CBC W/AUTO DIFF WBC: CPT | Performed by: EMERGENCY MEDICINE

## 2024-03-15 PROCEDURE — 94664 DEMO&/EVAL PT USE INHALER: CPT

## 2024-03-15 PROCEDURE — 83690 ASSAY OF LIPASE: CPT | Performed by: EMERGENCY MEDICINE

## 2024-03-15 PROCEDURE — 99284 EMERGENCY DEPT VISIT MOD MDM: CPT

## 2024-03-15 PROCEDURE — 93005 ELECTROCARDIOGRAM TRACING: CPT

## 2024-03-15 PROCEDURE — 94760 N-INVAS EAR/PLS OXIMETRY 1: CPT

## 2024-03-15 PROCEDURE — 74177 CT ABD & PELVIS W/CONTRAST: CPT

## 2024-03-15 PROCEDURE — 80053 COMPREHEN METABOLIC PANEL: CPT | Performed by: EMERGENCY MEDICINE

## 2024-03-15 PROCEDURE — 94640 AIRWAY INHALATION TREATMENT: CPT

## 2024-03-15 PROCEDURE — 36415 COLL VENOUS BLD VENIPUNCTURE: CPT | Performed by: EMERGENCY MEDICINE

## 2024-03-15 PROCEDURE — 96375 TX/PRO/DX INJ NEW DRUG ADDON: CPT

## 2024-03-15 PROCEDURE — 96361 HYDRATE IV INFUSION ADD-ON: CPT

## 2024-03-15 PROCEDURE — 99223 1ST HOSP IP/OBS HIGH 75: CPT

## 2024-03-15 PROCEDURE — 99285 EMERGENCY DEPT VISIT HI MDM: CPT | Performed by: EMERGENCY MEDICINE

## 2024-03-15 PROCEDURE — 96374 THER/PROPH/DIAG INJ IV PUSH: CPT

## 2024-03-15 RX ORDER — ONDANSETRON 2 MG/ML
4 INJECTION INTRAMUSCULAR; INTRAVENOUS ONCE
Status: COMPLETED | OUTPATIENT
Start: 2024-03-15 | End: 2024-03-15

## 2024-03-15 RX ORDER — PANTOPRAZOLE SODIUM 40 MG/1
40 TABLET, DELAYED RELEASE ORAL 2 TIMES DAILY
Status: DISCONTINUED | OUTPATIENT
Start: 2024-03-15 | End: 2024-03-16 | Stop reason: HOSPADM

## 2024-03-15 RX ORDER — SUCRALFATE 1 G/1
1 TABLET ORAL
Status: DISCONTINUED | OUTPATIENT
Start: 2024-03-15 | End: 2024-03-15

## 2024-03-15 RX ORDER — MORPHINE SULFATE 4 MG/ML
4 INJECTION, SOLUTION INTRAMUSCULAR; INTRAVENOUS ONCE
Status: COMPLETED | OUTPATIENT
Start: 2024-03-15 | End: 2024-03-15

## 2024-03-15 RX ORDER — BUDESONIDE AND FORMOTEROL FUMARATE DIHYDRATE 160; 4.5 UG/1; UG/1
2 AEROSOL RESPIRATORY (INHALATION)
Status: DISCONTINUED | OUTPATIENT
Start: 2024-03-16 | End: 2024-03-16 | Stop reason: HOSPADM

## 2024-03-15 RX ORDER — ENOXAPARIN SODIUM 100 MG/ML
40 INJECTION SUBCUTANEOUS DAILY
Status: DISCONTINUED | OUTPATIENT
Start: 2024-03-16 | End: 2024-03-16

## 2024-03-15 RX ORDER — LEVALBUTEROL INHALATION SOLUTION 1.25 MG/3ML
1.25 SOLUTION RESPIRATORY (INHALATION)
Status: DISCONTINUED | OUTPATIENT
Start: 2024-03-15 | End: 2024-03-15

## 2024-03-15 RX ORDER — KETOROLAC TROMETHAMINE 30 MG/ML
15 INJECTION, SOLUTION INTRAMUSCULAR; INTRAVENOUS ONCE
Status: COMPLETED | OUTPATIENT
Start: 2024-03-15 | End: 2024-03-15

## 2024-03-15 RX ORDER — LEVALBUTEROL INHALATION SOLUTION 1.25 MG/3ML
1.25 SOLUTION RESPIRATORY (INHALATION) EVERY 8 HOURS PRN
Status: DISCONTINUED | OUTPATIENT
Start: 2024-03-15 | End: 2024-03-16 | Stop reason: HOSPADM

## 2024-03-15 RX ORDER — LAMOTRIGINE 25 MG/1
50 TABLET ORAL 2 TIMES DAILY
Status: DISCONTINUED | OUTPATIENT
Start: 2024-03-15 | End: 2024-03-15

## 2024-03-15 RX ORDER — BUDESONIDE AND FORMOTEROL FUMARATE DIHYDRATE 160; 4.5 UG/1; UG/1
2 AEROSOL RESPIRATORY (INHALATION) 2 TIMES DAILY
Status: DISCONTINUED | OUTPATIENT
Start: 2024-03-16 | End: 2024-03-15

## 2024-03-15 RX ORDER — FLUOXETINE HYDROCHLORIDE 20 MG/1
40 CAPSULE ORAL DAILY
Status: DISCONTINUED | OUTPATIENT
Start: 2024-03-16 | End: 2024-03-16 | Stop reason: HOSPADM

## 2024-03-15 RX ORDER — PROMETHAZINE HYDROCHLORIDE 25 MG/ML
12.5 INJECTION, SOLUTION INTRAMUSCULAR; INTRAVENOUS ONCE
Status: COMPLETED | OUTPATIENT
Start: 2024-03-15 | End: 2024-03-15

## 2024-03-15 RX ORDER — CLONAZEPAM 0.5 MG/1
0.5 TABLET ORAL DAILY
Status: DISCONTINUED | OUTPATIENT
Start: 2024-03-16 | End: 2024-03-16 | Stop reason: HOSPADM

## 2024-03-15 RX ORDER — DIPHENHYDRAMINE HYDROCHLORIDE 50 MG/ML
25 INJECTION INTRAMUSCULAR; INTRAVENOUS ONCE
Status: COMPLETED | OUTPATIENT
Start: 2024-03-15 | End: 2024-03-15

## 2024-03-15 RX ORDER — METOCLOPRAMIDE HYDROCHLORIDE 5 MG/ML
10 INJECTION INTRAMUSCULAR; INTRAVENOUS ONCE
Status: COMPLETED | OUTPATIENT
Start: 2024-03-15 | End: 2024-03-15

## 2024-03-15 RX ORDER — ALBUTEROL SULFATE 90 UG/1
1 AEROSOL, METERED RESPIRATORY (INHALATION) EVERY 4 HOURS PRN
Status: DISCONTINUED | OUTPATIENT
Start: 2024-03-15 | End: 2024-03-16 | Stop reason: HOSPADM

## 2024-03-15 RX ORDER — DICYCLOMINE HCL 20 MG
20 TABLET ORAL ONCE
Status: COMPLETED | OUTPATIENT
Start: 2024-03-15 | End: 2024-03-15

## 2024-03-15 RX ORDER — SUMATRIPTAN 6 MG/.5ML
6 INJECTION, SOLUTION SUBCUTANEOUS ONCE
Status: COMPLETED | OUTPATIENT
Start: 2024-03-15 | End: 2024-03-15

## 2024-03-15 RX ORDER — SODIUM CHLORIDE 9 MG/ML
75 INJECTION, SOLUTION INTRAVENOUS CONTINUOUS
Status: DISPENSED | OUTPATIENT
Start: 2024-03-15 | End: 2024-03-16

## 2024-03-15 RX ORDER — METOCLOPRAMIDE HYDROCHLORIDE 5 MG/ML
10 INJECTION INTRAMUSCULAR; INTRAVENOUS EVERY 6 HOURS PRN
Status: DISCONTINUED | OUTPATIENT
Start: 2024-03-15 | End: 2024-03-16 | Stop reason: HOSPADM

## 2024-03-15 RX ORDER — MAGNESIUM SULFATE HEPTAHYDRATE 40 MG/ML
2 INJECTION, SOLUTION INTRAVENOUS ONCE
Status: COMPLETED | OUTPATIENT
Start: 2024-03-15 | End: 2024-03-15

## 2024-03-15 RX ADMIN — KETOROLAC TROMETHAMINE 15 MG: 30 INJECTION, SOLUTION INTRAMUSCULAR at 19:02

## 2024-03-15 RX ADMIN — PROMETHAZINE HYDROCHLORIDE 12.5 MG: 25 INJECTION INTRAMUSCULAR; INTRAVENOUS at 17:57

## 2024-03-15 RX ADMIN — IOHEXOL 100 ML: 350 INJECTION, SOLUTION INTRAVENOUS at 17:00

## 2024-03-15 RX ADMIN — SODIUM CHLORIDE 1000 ML: 0.9 INJECTION, SOLUTION INTRAVENOUS at 16:13

## 2024-03-15 RX ADMIN — PANTOPRAZOLE SODIUM 40 MG: 40 TABLET, DELAYED RELEASE ORAL at 21:20

## 2024-03-15 RX ADMIN — LEVALBUTEROL HYDROCHLORIDE 1.25 MG: 1.25 SOLUTION RESPIRATORY (INHALATION) at 20:25

## 2024-03-15 RX ADMIN — DIPHENHYDRAMINE HYDROCHLORIDE 25 MG: 50 INJECTION, SOLUTION INTRAMUSCULAR; INTRAVENOUS at 19:09

## 2024-03-15 RX ADMIN — MAGNESIUM SULFATE HEPTAHYDRATE 2 G: 40 INJECTION, SOLUTION INTRAVENOUS at 19:12

## 2024-03-15 RX ADMIN — DICYCLOMINE HYDROCHLORIDE 20 MG: 20 TABLET ORAL at 18:00

## 2024-03-15 RX ADMIN — SODIUM CHLORIDE 75 ML/HR: 0.9 INJECTION, SOLUTION INTRAVENOUS at 22:46

## 2024-03-15 RX ADMIN — KETOROLAC TROMETHAMINE 15 MG: 30 INJECTION, SOLUTION INTRAMUSCULAR at 17:59

## 2024-03-15 RX ADMIN — SUMATRIPTAN 6 MG: 6 INJECTION, SOLUTION SUBCUTANEOUS at 19:05

## 2024-03-15 RX ADMIN — SODIUM CHLORIDE 1000 ML: 0.9 INJECTION, SOLUTION INTRAVENOUS at 19:11

## 2024-03-15 RX ADMIN — ONDANSETRON 4 MG: 2 INJECTION INTRAMUSCULAR; INTRAVENOUS at 16:17

## 2024-03-15 RX ADMIN — METOCLOPRAMIDE 10 MG: 5 INJECTION, SOLUTION INTRAMUSCULAR; INTRAVENOUS at 19:02

## 2024-03-15 RX ADMIN — MORPHINE SULFATE 4 MG: 4 INJECTION INTRAVENOUS at 16:34

## 2024-03-15 NOTE — ED PROVIDER NOTES
"History  Chief Complaint   Patient presents with    Abdominal Pain     Pt presents to ED from home w/ abd pain for two hours, pt states she vomiting \"poop\".  Pt (+) of bowel obstruction     58-year-old female comes in for evaluation of vomiting.  Patient states she began to have abdominal pain approximately 2 hours ago and has been intermittently vomiting for the past hour.  Patient is concerned she might have a bowel obstruction.  States that her vomitus smells like stool.      History provided by:  Patient   used: No    Abdominal Pain  Pain location:  Generalized  Pain quality: pressure    Pain radiates to:  Does not radiate  Pain severity:  Severe  Onset quality:  Sudden  Duration:  2 hours  Timing:  Constant  Progression:  Worsening  Chronicity:  New  Context: alcohol use and previous surgery    Worsened by:  Movement, palpation and position changes  Ineffective treatments:  None tried  Associated symptoms: anorexia, nausea and vomiting    Associated symptoms: no chest pain, no cough, no diarrhea, no fatigue, no fever, no hematuria and no shortness of breath    Risk factors: alcohol abuse, NSAID use and obesity        Prior to Admission Medications   Prescriptions Last Dose Informant Patient Reported? Taking?   B-D 3CC LUER-ARACELIS SYR 25GX1/2\" 25G X 1-1/2\" 3 ML MISC   No No   Sig: USE FOR TORADOL INTRAMUSCULAR INJECTIONS   FLUoxetine (PROzac) 40 MG capsule  Self Yes No   Sig: TAKE 2 CAPSULES BY MOUTH EVERY DAY IN THE MORNING   FeroSul 325 (65 Fe) MG tablet   Yes No   Syringe/Needle, Disp, (SYRINGE 3CC/37JL7-8/4\") 27G X 1-1/4\" 3 ML MISC   No No   Sig: Use for Toradol intramuscular injections.   acetaminophen-codeine (TYLENOL with CODEINE #2) 300-15 MG   Yes No   Sig: Take 1 tablet by mouth every 8 (eight) hours as needed   albuterol (PROVENTIL HFA,VENTOLIN HFA) 90 mcg/act inhaler   Yes No   benzonatate (TESSALON PERLES) 100 mg capsule   No No   Sig: Take 1 capsule (100 mg total) by mouth 3 " (three) times a day as needed for cough   budesonide-formoterol (SYMBICORT) 160-4.5 mcg/act inhaler  Self Yes No   Sig: Inhale 2 puffs 2 (two) times a day Morning and evening   clonazePAM (KlonoPIN) 0.5 mg tablet  Self Yes No   Sig: Take 0.5 mg by mouth daily   cyanocobalamin (VITAMIN B-12) 1000 MCG tablet   No No   Sig: Take 1 tablet (1,000 mcg total) by mouth daily   cyproheptadine (PERIACTIN) 4 mg tablet   No No   Sig: TAKE 1 TABLET(4 MG) BY MOUTH DAILY AT BEDTIME AS NEEDED FOR ALLERGIES OR HEADACHE   ipratropium (ATROVENT) 0.02 % nebulizer solution   No No   Sig: Take 2.5 mL (0.5 mg total) by nebulization 3 (three) times a day   lamoTRIgine (LaMICtal) 25 mg tablet   Yes No   Sig: Take 50 mg by mouth 2 (two) times a day   levalbuterol (XOPENEX) 1.25 mg/3 mL nebulizer solution   No No   Sig: Take 3 mL (1.25 mg total) by nebulization 3 (three) times a day   pantoprazole (PROTONIX) 40 mg tablet   No No   Sig: Take 1 tablet (40 mg total) by mouth 2 (two) times a day   sucralfate (CARAFATE) 1 g tablet   No No   Sig: TAKE 1 TABLET(1 GRAM) BY MOUTH FOUR TIMES DAILY      Facility-Administered Medications: None       Past Medical History:   Diagnosis Date    Anxiety     Asthma     Depression     Hypertension     Migraine     Rapid heart rate        Past Surgical History:   Procedure Laterality Date    BARIATRIC SURGERY      CHOLECYSTECTOMY      GASTRIC BYPASS  2018    also had repair of perforated bowel afterwards    HERNIA REPAIR      HYSTERECTOMY      LUNG SURGERY      [place] REPAIR FIRST ABDOMINAL WALL HERNIA N/A 01/28/2022    Procedure: OPEN INCISIONAL HERNIA REPAIR WITH MESH;  Surgeon: Bill Baxter MD;  Location: [facility];  Service: General    UPPER GASTROINTESTINAL ENDOSCOPY         Family History   Problem Relation Age of Onset    Cancer Mother         liver    Diabetes Father     Lung cancer Father     No Known Problems Sister     No Known Problems Sister     No Known Problems Sister     No Known Problems Daughter      No Known Problems Maternal Grandmother     No Known Problems Maternal Grandfather     No Known Problems Paternal Grandmother     No Known Problems Paternal Grandfather     No Known Problems Son     No Known Problems Maternal Aunt     No Known Problems Maternal Aunt     No Known Problems Paternal Aunt      I have reviewed and agree with the history as documented.    E-Cigarette/Vaping    E-Cigarette Use Never User      E-Cigarette/Vaping Substances    Nicotine No     THC No     CBD No     Flavoring No     Other No     Unknown No      Social History     Tobacco Use    Smoking status: Former     Current packs/day: 0.00     Types: Cigarettes     Quit date:      Years since quittin.2    Smokeless tobacco: Never   Vaping Use    Vaping status: Never Used   Substance Use Topics    Alcohol use: Yes     Comment: Occ socially    Drug use: Not Currently     Types: Cocaine     Comment: last cocain use        Review of Systems   Constitutional:  Negative for fatigue and fever.   HENT:  Negative for congestion and ear pain.    Eyes:  Negative for discharge and redness.   Respiratory:  Negative for apnea, cough, shortness of breath and wheezing.    Cardiovascular:  Negative for chest pain.   Gastrointestinal:  Positive for abdominal pain, anorexia, nausea and vomiting. Negative for diarrhea.   Endocrine: Negative for cold intolerance and polydipsia.   Genitourinary:  Negative for difficulty urinating and hematuria.   Musculoskeletal:  Negative for arthralgias and back pain.   Skin:  Negative for color change and rash.   Allergic/Immunologic: Negative for environmental allergies and immunocompromised state.   Neurological:  Negative for numbness and headaches.   Hematological:  Negative for adenopathy. Does not bruise/bleed easily.   Psychiatric/Behavioral:  Negative for agitation and behavioral problems.        Physical Exam  Physical Exam  Vitals and nursing note reviewed.   Constitutional:       General: She is  in acute distress.      Appearance: Normal appearance. She is well-developed. She is ill-appearing. She is not toxic-appearing.   HENT:      Head: Normocephalic and atraumatic.      Right Ear: Tympanic membrane and external ear normal.      Left Ear: Tympanic membrane and external ear normal.      Nose: Nose normal. No nasal deformity or rhinorrhea.      Mouth/Throat:      Dentition: Normal dentition.      Pharynx: Uvula midline.   Eyes:      General: Lids are normal.         Right eye: No discharge.         Left eye: No discharge.      Conjunctiva/sclera: Conjunctivae normal.      Pupils: Pupils are equal, round, and reactive to light.   Neck:      Vascular: No carotid bruit or JVD.      Trachea: Trachea normal.   Cardiovascular:      Rate and Rhythm: Normal rate and regular rhythm. No extrasystoles are present.     Chest Wall: PMI is not displaced.      Pulses: Normal pulses.   Pulmonary:      Effort: Pulmonary effort is normal. No accessory muscle usage or respiratory distress.      Breath sounds: Normal breath sounds. No wheezing, rhonchi or rales.   Abdominal:      General: Bowel sounds are normal.      Palpations: Abdomen is soft. Abdomen is not rigid. There is no mass.      Tenderness: There is generalized abdominal tenderness. There is no guarding or rebound.   Musculoskeletal:      Right shoulder: No swelling, deformity or bony tenderness. Normal range of motion.      Cervical back: Normal range of motion and neck supple. No deformity, tenderness or bony tenderness.   Lymphadenopathy:      Cervical: No cervical adenopathy.   Skin:     General: Skin is warm and dry.      Findings: No rash.   Neurological:      Mental Status: She is alert and oriented to person, place, and time.      GCS: GCS eye subscore is 4. GCS verbal subscore is 5. GCS motor subscore is 6.      Cranial Nerves: No cranial nerve deficit.      Sensory: No sensory deficit.      Deep Tendon Reflexes: Reflexes are normal and symmetric.    Psychiatric:         Speech: Speech normal.         Behavior: Behavior normal.         Vital Signs  ED Triage Vitals   Temperature Pulse Respirations Blood Pressure SpO2   03/15/24 1542 03/15/24 1542 03/15/24 1542 03/15/24 1542 03/15/24 1542   (!) 97.4 °F (36.3 °C) 89 16 (!) 108/38 99 %      Temp Source Heart Rate Source Patient Position - Orthostatic VS BP Location FiO2 (%)   03/15/24 1542 03/15/24 1700 03/15/24 1700 03/15/24 1700 --   Oral Monitor Lying Left arm       Pain Score       03/15/24 1634       9           Vitals:    03/15/24 1542 03/15/24 1630 03/15/24 1700 03/15/24 1731   BP: (!) 108/38 126/60 121/76 136/66   Pulse: 89 85 97 94   Patient Position - Orthostatic VS:   Lying          Visual Acuity      ED Medications  Medications   diphenhydrAMINE (BENADRYL) injection 25 mg (has no administration in time range)   SUMAtriptan (IMITREX) subcutaneous injection 6 mg (has no administration in time range)   magnesium sulfate 2 g/50 mL IVPB (premix) 2 g (has no administration in time range)   sodium chloride 0.9 % bolus 1,000 mL (has no administration in time range)   ondansetron (ZOFRAN) injection 4 mg (4 mg Intravenous Given 3/15/24 1617)   sodium chloride 0.9 % bolus 1,000 mL (0 mL Intravenous Stopped 3/15/24 1730)   morphine injection 4 mg (4 mg Intravenous Given 3/15/24 1634)   iohexol (OMNIPAQUE) 350 MG/ML injection (SINGLE-DOSE) 100 mL (100 mL Intravenous Given 3/15/24 1700)   promethazine (PHENERGAN) injection 12.5 mg (12.5 mg Intravenous Given 3/15/24 1757)   ketorolac (TORADOL) injection 15 mg (15 mg Intravenous Given 3/15/24 1759)   dicyclomine (BENTYL) tablet 20 mg (20 mg Oral Given 3/15/24 1800)   ketorolac (TORADOL) injection 15 mg (15 mg Intravenous Given 3/15/24 1902)   metoclopramide (REGLAN) injection 10 mg (10 mg Intravenous Given 3/15/24 1902)       Diagnostic Studies  Results Reviewed       Procedure Component Value Units Date/Time    Comprehensive metabolic panel [[id number]]  (Abnormal)  Collected: 03/15/24 1611    Lab Status: Final result Specimen: Blood from Arm, Right Updated: 03/15/24 1636     Sodium 139 mmol/L      Potassium 4.0 mmol/L      Chloride 104 mmol/L      CO2 26 mmol/L      ANION GAP 9 mmol/L      BUN 9 mg/dL      Creatinine 0.80 mg/dL      Glucose 110 mg/dL      Calcium 9.0 mg/dL      AST 10 U/L      ALT 11 U/L      Alkaline Phosphatase 92 U/L      Total Protein 6.9 g/dL      Albumin 3.7 g/dL      Total Bilirubin 0.25 mg/dL      eGFR 82 ml/min/1.73sq m     Narrative:      National Kid[employer] guidelines for Chronic Kidney Disease (CKD):     Stage 1 with normal or high GFR (GFR > 90 mL/min/1.73 square meters)    Stage 2 Mild CKD (GFR = 60-89 mL/min/1.73 square meters)    Stage 3A Moderate CKD (GFR = 45-59 mL/min/1.73 square meters)    Stage 3B Moderate CKD (GFR = 30-44 mL/min/1.73 square meters)    Stage 4 Severe CKD (GFR = 15-29 mL/min/1.73 square meters)    Stage 5 End Stage CKD (GFR <15 mL/min/1.73 square meters)  Note: GFR calculation is accurate only with a steady state creatinine    Lipase [[id number]]  (Normal) Collected: 03/15/24 1611    Lab Status: Final result Specimen: Blood from Arm, Right Updated: 03/15/24 1636     Lipase 26 u/L     CBC and differential [[id number]]  (Abnormal) Collected: 03/15/24 1611    Lab Status: Final result Specimen: Blood from Arm, Right Updated: 03/15/24 1619     WBC 7.99 Thousand/uL      RBC 4.75 Million/uL      Hemoglobin 11.6 g/dL      Hematocrit 37.9 %      MCV 80 fL      MCH 24.4 pg      MCHC 30.6 g/dL      RDW 15.1 %      MPV 9.1 fL      Platelets 369 Thousands/uL      nRBC 0 /100 WBCs      Neutrophils Relative 73 %      Immature Grans % 1 %      Lymphocytes Relative 18 %      Monocytes Relative 6 %      Eosinophils Relative 1 %      Basophils Relative 1 %      Neutrophils Absolute 5.90 Thousands/µL      Absolute Immature Grans 0.05 Thousand/uL      Absolute Lymphocytes 1.41 Thousands/µL      Absolute Monocytes 0.48 Thousand/µL       Eosinophils Absolute 0.10 Thousand/µL      Basophils Absolute 0.05 Thousands/µL                    CT abdomen pelvis with contrast   Final Result by Maicol Hargrove MD (03/15 1740)      No acute findings         Workstation performed: [id number]                    Procedures  Procedures         ED Course  ED Course as of 03/15/24 1905   Fri Mar 15, 2024   1754 Patient's blood work within normal limits as is her CAT scan.  Patient still complaining of abdominal pain.  Patient has not vomited since she has been in the emergency department.  We will try to p.o. challenge her and control her symptoms.                                             Medical Decision Making  Differential diagnosis includes but is not limited to intractable vomiting, gastroenteritis, gastritis, bowel obstruction, colitis, IBS, Crohn's, JOSE, migraine, abdominal migraine,    Problems Addressed:  Intractable vomiting: acute illness or injury    Amount and/or Complexity of Data Reviewed  Labs: ordered. Decision-making details documented in ED Course.  Radiology: ordered. Decision-making details documented in ED Course.    Risk  Prescription drug management.  Decision regarding hospitalization.  Risk Details: Discussed with internal medicine team despite multiple medications patient still continues to vomit will keep in the hospital overnight             Disposition  Final diagnoses:   Intractable vomiting     Time reflects when diagnosis was documented in both MDM as applicable and the Disposition within this note       Time User Action Codes Description Comment    3/15/2024  6:59 PM Coreen Smith Add [R11.10] Intractable vomiting           ED Disposition       ED Disposition   Admit    Condition   Stable    Date/Time   Fri Mar 15, 2024  6:59 PM    Comment   Case was discussed with dr roberts and the patient's admission status was agreed to be Admission Status: observation status to the service of Dr. hernandez .                Follow-up Information    None         Patient's Medications   Discharge Prescriptions    No medications on file       No discharge procedures on file.    PDMP Review         Value Time User    PDMP Reviewed  Yes 12/2/2022  2:04 PM Tahira Garcia PA-C            ED Provider  Electronically Signed by             Coreen Smith DO  03/15/24 2993

## 2024-03-15 NOTE — ASSESSMENT & PLAN NOTE
Patient with 2 hours of nausea and vomiting, received Phenergan, Reglan, Zofran, Bentyl in the ED without relief of nausea and vomiting.  Admitted for observation given refractory vomiting, at the time my examination patient is without nausea vomiting or abdominal pain  She endorses constipation stating that her last bowel movement 7 days ago, she took 2 stool softeners earlier this morning.  Denies obstipation.  CT scan showed no acute abnormalities, evidence of prior gastric bypass surgery.  Plan  Will encourage liquid intake, continue IV fluids  Continue clear liquid diet  Continue to monitor for signs of small bowel obstruction.

## 2024-03-16 VITALS
DIASTOLIC BLOOD PRESSURE: 75 MMHG | HEIGHT: 63 IN | HEART RATE: 75 BPM | RESPIRATION RATE: 16 BRPM | SYSTOLIC BLOOD PRESSURE: 114 MMHG | OXYGEN SATURATION: 98 % | BODY MASS INDEX: 30.83 KG/M2 | WEIGHT: 174 LBS | TEMPERATURE: 98.2 F

## 2024-03-16 PROBLEM — J45.909 MODERATE ASTHMA: Status: ACTIVE | Noted: 2023-12-11

## 2024-03-16 LAB
ALBUMIN SERPL BCP-MCNC: 2.8 G/DL (ref 3.5–5)
ALP SERPL-CCNC: 87 U/L (ref 34–104)
ALT SERPL W P-5'-P-CCNC: 16 U/L (ref 7–52)
ANION GAP SERPL CALCULATED.3IONS-SCNC: 5 MMOL/L (ref 4–13)
AST SERPL W P-5'-P-CCNC: 20 U/L (ref 13–39)
ATRIAL RATE: 87 BPM
BASOPHILS # BLD AUTO: 0.03 THOUSANDS/ÂΜL (ref 0–0.1)
BASOPHILS NFR BLD AUTO: 1 % (ref 0–1)
BILIRUB SERPL-MCNC: 0.24 MG/DL (ref 0.2–1)
BUN SERPL-MCNC: 8 MG/DL (ref 5–25)
CALCIUM ALBUM COR SERPL-MCNC: 8.9 MG/DL (ref 8.3–10.1)
CALCIUM SERPL-MCNC: 7.9 MG/DL (ref 8.4–10.2)
CHLORIDE SERPL-SCNC: 110 MMOL/L (ref 96–108)
CO2 SERPL-SCNC: 25 MMOL/L (ref 21–32)
CREAT SERPL-MCNC: 0.65 MG/DL (ref 0.6–1.3)
EOSINOPHIL # BLD AUTO: 0.13 THOUSAND/ÂΜL (ref 0–0.61)
EOSINOPHIL NFR BLD AUTO: 3 % (ref 0–6)
ERYTHROCYTE [DISTWIDTH] IN BLOOD BY AUTOMATED COUNT: 15.3 % (ref 11.6–15.1)
GFR SERPL CREATININE-BSD FRML MDRD: 98 ML/MIN/1.73SQ M
GLUCOSE SERPL-MCNC: 85 MG/DL (ref 65–140)
HCT VFR BLD AUTO: 30.7 % (ref 34.8–46.1)
HGB BLD-MCNC: 9.3 G/DL (ref 11.5–15.4)
IMM GRANULOCYTES # BLD AUTO: 0.03 THOUSAND/UL (ref 0–0.2)
IMM GRANULOCYTES NFR BLD AUTO: 1 % (ref 0–2)
LYMPHOCYTES # BLD AUTO: 1.27 THOUSANDS/ÂΜL (ref 0.6–4.47)
LYMPHOCYTES NFR BLD AUTO: 24 % (ref 14–44)
MAGNESIUM SERPL-MCNC: 2.5 MG/DL (ref 1.9–2.7)
MCH RBC QN AUTO: 24.4 PG (ref 26.8–34.3)
MCHC RBC AUTO-ENTMCNC: 30.3 G/DL (ref 31.4–37.4)
MCV RBC AUTO: 81 FL (ref 82–98)
MONOCYTES # BLD AUTO: 0.35 THOUSAND/ÂΜL (ref 0.17–1.22)
MONOCYTES NFR BLD AUTO: 7 % (ref 4–12)
NEUTROPHILS # BLD AUTO: 3.42 THOUSANDS/ÂΜL (ref 1.85–7.62)
NEUTS SEG NFR BLD AUTO: 64 % (ref 43–75)
NRBC BLD AUTO-RTO: 0 /100 WBCS
P AXIS: 62 DEGREES
PLATELET # BLD AUTO: 276 THOUSANDS/UL (ref 149–390)
PMV BLD AUTO: 9.4 FL (ref 8.9–12.7)
POTASSIUM SERPL-SCNC: 3.8 MMOL/L (ref 3.5–5.3)
PR INTERVAL: 150 MS
PROT SERPL-MCNC: 5.2 G/DL (ref 6.4–8.4)
QRS AXIS: 67 DEGREES
QRSD INTERVAL: 82 MS
QT INTERVAL: 396 MS
QTC INTERVAL: 476 MS
RBC # BLD AUTO: 3.81 MILLION/UL (ref 3.81–5.12)
SODIUM SERPL-SCNC: 140 MMOL/L (ref 135–147)
T WAVE AXIS: 70 DEGREES
VENTRICULAR RATE: 87 BPM
WBC # BLD AUTO: 5.23 THOUSAND/UL (ref 4.31–10.16)

## 2024-03-16 PROCEDURE — 94640 AIRWAY INHALATION TREATMENT: CPT

## 2024-03-16 PROCEDURE — 80053 COMPREHEN METABOLIC PANEL: CPT

## 2024-03-16 PROCEDURE — 99239 HOSP IP/OBS DSCHRG MGMT >30: CPT | Performed by: INTERNAL MEDICINE

## 2024-03-16 PROCEDURE — 85025 COMPLETE CBC W/AUTO DIFF WBC: CPT

## 2024-03-16 PROCEDURE — 93010 ELECTROCARDIOGRAM REPORT: CPT | Performed by: INTERNAL MEDICINE

## 2024-03-16 PROCEDURE — 94760 N-INVAS EAR/PLS OXIMETRY 1: CPT

## 2024-03-16 PROCEDURE — 83735 ASSAY OF MAGNESIUM: CPT

## 2024-03-16 RX ADMIN — FLUOXETINE HYDROCHLORIDE 40 MG: 20 CAPSULE ORAL at 08:05

## 2024-03-16 RX ADMIN — BUDESONIDE AND FORMOTEROL FUMARATE DIHYDRATE 2 PUFF: 160; 4.5 AEROSOL RESPIRATORY (INHALATION) at 08:55

## 2024-03-16 RX ADMIN — CLONAZEPAM 0.5 MG: 0.5 TABLET ORAL at 08:05

## 2024-03-16 RX ADMIN — PANTOPRAZOLE SODIUM 40 MG: 40 TABLET, DELAYED RELEASE ORAL at 08:05

## 2024-03-16 NOTE — H&P
[facility]  H&P  Name: Miya Camarillo 58 y.o. female I MRN: [id number]  Unit/Bed#: MS [facility]-01 I Date of Admission: 3/15/2024   Date of Service: 3/16/2024 I Hospital Day: 0      Assessment/Plan   * Nausea & vomiting  Assessment & Plan  Patient with 2 hours of nausea and vomiting, received Phenergan, Reglan, Zofran, Bentyl in the ED without relief of nausea and vomiting.  Admitted for observation given refractory vomiting, at the time my examination patient is without nausea vomiting or abdominal pain  She endorses constipation stating that her last bowel movement 7 days ago, she took 2 stool softeners earlier this morning.  Denies obstipation.  CT scan showed no acute abnormalities, evidence of prior gastric bypass surgery.  Plan  Will encourage liquid intake, continue IV fluids  Continue clear liquid diet  Continue to monitor for signs of small bowel obstruction.    H/O bariatric surgery  Assessment & Plan  History of gastric bypass surgery in 2018, last EGD in January 2023 showed improving ulcer at the anastomosis site.  CT abdomen without any acute findings, gastric bypass surgery seen.    Gastroesophageal reflux disease  Assessment & Plan  Last EDG 1/4/2023, showing hiatal hernia Schatzki's ring and improving ulcer of the anastomosis site from her gastric bypass surgery.  Follows with GI as outpatient.  Continue protonix 40mg     HTN (hypertension)  Assessment & Plan  Continue Klonopin daily.  Blood pressure has been stable on admission.  Continue to monitor, hold Klonopin if BP is SBP <110    Moderate asthma  Assessment & Plan  Continue daily Symbicort, as needed albuterol, as needed Xopenex       VTE Pharmacologic Prophylaxis: VTE Score: 2 Low Risk (Score 0-2) - Encourage Ambulation.  Code Status: Level 1 - Full Code per patient  Discussion with family: Updated  () via phone.    Anticipated Length of Stay: Patient will be admitted on an observation basis with an  anticipated length of stay of less than 2 midnights secondary to intractable nausea and vomiting.    Total Time Spent on Date of Encounter in care of patient: 65 mins. This time was spent on one or more of the following: performing physical exam; counseling and coordination of care; obtaining or reviewing history; documenting in the medical record; reviewing/ordering tests, medications or procedures; communicating with other healthcare professionals and discussing with patient's family/caregivers.    Chief Complaint: Abdominal pain x 2 hours    History of Present Illness:  Miya Camarillo is a 58 y.o. female with a PMH of hypertension, gastric bypass surgery, hysterectomy, asthma, GERD, anxiety who presents with nausea and vomiting that began suddenly at 2 PM today with associated epigastric abdominal cramping.  The patient states she vomited about 3 times and by the third time she felt she was vomiting stomach bile.  She did not respond to multiple antiemetics given in the ED.  She has a history of multiple abdominal surgeries including cholecystectomy, hysterectomy, hernia repair, gastric bypass surgery and is concerned she may have a bowel obstruction.  She does endorse constipation, reports that her last bowel movement was 10 days ago.  This is chronic for her.  She took 2 OTC stool softeners this morning.  The patient denies any diarrhea, obstipation, hematemesis, coffee-ground emesis, sick contacts.  At the time of my examination the patient is nausea and abdominal pain has subsided.  Denies tobacco use, alcohol use.    Review of Systems:  Review of Systems   Constitutional:  Positive for appetite change. Negative for chills, fatigue and fever.   HENT:  Negative for drooling, sore throat and trouble swallowing.    Respiratory:  Negative for cough and shortness of breath.    Cardiovascular:  Negative for chest pain and palpitations.   Gastrointestinal:  Positive for abdominal pain, constipation, nausea and  "vomiting. Negative for abdominal distention and diarrhea.   Genitourinary:  Negative for dysuria, flank pain and hematuria.   Musculoskeletal:  Negative for arthralgias, joint swelling and myalgias.   Neurological:  Negative for dizziness, syncope, numbness and headaches.       Past Medical and Surgical History:   Past Medical History:   Diagnosis Date    Anxiety     Asthma     Depression     Hypertension     Migraine     Rapid heart rate        Past Surgical History:   Procedure Laterality Date    BARIATRIC SURGERY      CHOLECYSTECTOMY      GASTRIC BYPASS  2018    also had repair of perforated bowel afterwards    HERNIA REPAIR      HYSTERECTOMY      LUNG SURGERY      MO REPAIR FIRST ABDOMINAL WALL HERNIA N/A 01/28/2022    Procedure: OPEN INCISIONAL HERNIA REPAIR WITH MESH;  Surgeon: Bill Baxter MD;  Location: [facility];  Service: General    UPPER GASTROINTESTINAL ENDOSCOPY         Meds/Allergies:  Prior to Admission medications    Medication Sig Start Date End Date Taking? Authorizing Provider   acetaminophen-codeine (TYLENOL with CODEINE #2) 300-15 MG Take 1 tablet by mouth every 8 (eight) hours as needed 3/15/23   Historical Provider, MD   albuterol (PROVENTIL HFA,VENTOLIN HFA) 90 mcg/act inhaler  5/17/23   Historical Provider, MD GALLEGOS 3CC LUER-ARACELIS SYR 25GX1/2\" 25G X 1-1/2\" 3 ML MISC USE FOR TORADOL INTRAMUSCULAR INJECTIONS 8/25/20   Lisa Yen PA-C   benzonatate (TESSALON PERLES) 100 mg capsule Take 1 capsule (100 mg total) by mouth 3 (three) times a day as needed for cough 12/13/23   Eugenio Rodrigues,    budesonide-formoterol (SYMBICORT) 160-4.5 mcg/act inhaler Inhale 2 puffs 2 (two) times a day Morning and evening 11/7/21   Historical Provider, MD   clonazePAM (KlonoPIN) 0.5 mg tablet Take 0.5 mg by mouth daily    Historical Provider, MD   cyanocobalamin (VITAMIN B-12) 1000 MCG tablet Take 1 tablet (1,000 mcg total) by mouth daily 12/14/23   Eugenio Rodrigues DO   cyproheptadine (PERIACTIN) 4 mg tablet TAKE " "1 TABLET(4 MG) BY MOUTH DAILY AT BEDTIME AS NEEDED FOR ALLERGIES OR HEADACHE 22   Lisa Yen PA-C   FeroSul 325 (65 Fe) MG tablet  3/1/23   Historical Provider, MD   FLUoxetine (PROzac) 40 MG capsule TAKE 2 CAPSULES BY MOUTH EVERY DAY IN THE MORNING 21   Historical Provider, MD   ipratropium (ATROVENT) 0.02 % nebulizer solution Take 2.5 mL (0.5 mg total) by nebulization 3 (three) times a day 23   Eugenio Rodrigues DO   lamoTRIgine (LaMICtal) 25 mg tablet Take 50 mg by mouth 2 (two) times a day 23   Historical Provider, MD   levalbuterol (XOPENEX) 1.25 mg/3 mL nebulizer solution Take 3 mL (1.25 mg total) by nebulization 3 (three) times a day 23   Eugenio Rodrigues,    pantoprazole (PROTONIX) 40 mg tablet Take 1 tablet (40 mg total) by mouth 2 (two) times a day 23   Amber Lovett PA-C   sucralfate (CARAFATE) 1 g tablet TAKE 1 TABLET(1 GRAM) BY MOUTH FOUR TIMES DAILY 2/15/23   Aliyah Sadler PA-C   Syringe/Needle, Disp, (SYRINGE 3CC/78WT7-5/4\") 27G X 1-\" 3 ML MISC Use for Toradol intramuscular injections. 22   Lisa Yen PA-C     I have reviewed home medications with patient personally.    Allergies:   Allergies   Allergen Reactions    Penicillins Shortness Of Breath    Latex Hives    Other Other (See Comments)      STEROIDS due to Bypass       Social History:  Marital Status: /Civil Union   Occupation: none  Patient Pre-hospital Living Situation: Home, With spouse  Patient Pre-hospital Level of Mobility: walks  Patient Pre-hospital Diet Restrictions: none  Substance Use History:   Social History     Substance and Sexual Activity   Alcohol Use Yes    Comment: Occ socially     Social History     Tobacco Use   Smoking Status Former    Current packs/day: 0.00    Types: Cigarettes    Quit date:     Years since quittin.2   Smokeless Tobacco Never     Social History     Substance and Sexual Activity   Drug Use Not Currently    Types: Cocaine    Comment: last cocain " "use 1992       Family History:  Family History   Problem Relation Age of Onset    Cancer Mother         liver    Diabetes Father     Lung cancer Father     No Known Problems Sister     No Known Problems Sister     No Known Problems Sister     No Known Problems Daughter     No Known Problems Maternal Grandmother     No Known Problems Maternal Grandfather     No Known Problems Paternal Grandmother     No Known Problems Paternal Grandfather     No Known Problems Son     No Known Problems Maternal Aunt     No Known Problems Maternal Aunt     No Known Problems Paternal Aunt        Physical Exam:     Vitals:   Blood Pressure: 102/67 (03/15/24 2005)  Pulse: 82 (03/15/24 2024)  Temperature: 97.6 °F (36.4 °C) (03/15/24 2005)  Temp Source: Tympanic (03/15/24 2005)  Respirations: 18 (03/15/24 2024)  Height: 5' 2.5\" (158.8 cm) (03/15/24 2005)  Weight - Scale: 78.9 kg (174 lb) (03/15/24 2005)  SpO2: 99 % (03/15/24 2025)    Physical Exam  Vitals reviewed.   Constitutional:       General: She is not in acute distress.     Appearance: She is not diaphoretic.   HENT:      Nose: Nose normal.      Mouth/Throat:      Mouth: Mucous membranes are dry.   Eyes:      Conjunctiva/sclera: Conjunctivae normal.   Cardiovascular:      Rate and Rhythm: Normal rate and regular rhythm.      Heart sounds: Normal heart sounds.   Pulmonary:      Effort: Pulmonary effort is normal.      Breath sounds: Normal breath sounds.   Abdominal:      General: Bowel sounds are normal. There is no distension.      Palpations: Abdomen is soft.      Tenderness: There is no abdominal tenderness. There is no guarding.   Musculoskeletal:      Right lower leg: No edema.      Left lower leg: No edema.   Skin:     General: Skin is warm and dry.   Neurological:      Mental Status: She is alert and oriented to person, place, and time.         Additional Data:     Lab Results:  Results from last 7 days   Lab Units 03/15/24  1611   WBC Thousand/uL 7.99   HEMOGLOBIN g/dL 11.6 "   HEMATOCRIT % 37.9   PLATELETS Thousands/uL 369   NEUTROS PCT % 73   LYMPHS PCT % 18   MONOS PCT % 6   EOS PCT % 1     Results from last 7 days   Lab Units 03/15/24  1611   SODIUM mmol/L 139   POTASSIUM mmol/L 4.0   CHLORIDE mmol/L 104   CO2 mmol/L 26   BUN mg/dL 9   CREATININE mg/dL 0.80   ANION GAP mmol/L 9   CALCIUM mg/dL 9.0   ALBUMIN g/dL 3.7   TOTAL BILIRUBIN mg/dL 0.25   ALK PHOS U/L 92   ALT U/L 11   AST U/L 10*   GLUCOSE RANDOM mg/dL 110                       Lines/Drains:  Invasive Devices       Peripheral Intravenous Line  Duration             Peripheral IV 03/15/24 Right Antecubital <1 day                        Imaging: Reviewed radiology reports from this admission including: abdominal/pelvic CT  CT abdomen pelvis with contrast   Final Result by Maicol Hargrove MD (03/15 1740)      No acute findings         Workstation performed: [id number]             EKG and Other Studies Reviewed on Admission:   EKG: NSR. HR 87.    ** Please Note: This note has been constructed using a voice recognition system. **

## 2024-03-16 NOTE — DISCHARGE SUMMARY
[facility]  Discharge- Miya Camarillo 1965, 58 y.o. female MRN: [id number]  Unit/Bed#: MS [facility] Encounter: [id number]  Primary Care Provider: Suzanne Chappell MD   Date and time admitted to hospital: 3/15/2024  3:40 PM    Moderate asthma  Assessment & Plan  Continue daily Symbicort, as needed albuterol, as needed Xopenex    Gastroesophageal reflux disease  Assessment & Plan  Last EDG 1/4/2023, showing hiatal hernia Schatzki's ring and improving ulcer of the anastomosis site from her gastric bypass surgery.  Follows with GI as outpatient.  Continue protonix 40mg     H/O bariatric surgery  Assessment & Plan  History of gastric bypass surgery in 2018, last EGD in January 2023 showed improving ulcer at the anastomosis site.  CT abdomen without any acute findings, gastric bypass surgery seen.    HTN (hypertension)  Assessment & Plan  Continue Klonopin daily.  Blood pressure has been stable on admission.  Continue to monitor, hold Klonopin if BP is SBP <110    * Nausea & vomiting  Assessment & Plan  Patient with 2 hours of nausea and vomiting, received Phenergan, Reglan, Zofran, Bentyl in the ED without relief of nausea and vomiting.  Admitted for observation given refractory vomiting, at the time my examination patient is without nausea vomiting or abdominal pain  She endorses constipation stating that her last bowel movement 7 days ago, she took 2 stool softeners earlier this morning.  Denies obstipation.  CT scan showed no acute abnormalities, evidence of prior gastric bypass surgery.  Plan  Will encourage liquid intake, continue IV fluids  Continue clear liquid diet  Continue to monitor for signs of small bowel obstruction.        Medical Problems       Resolved Problems  Date Reviewed: 3/16/2024   None       Discharging Physician / Practitioner: Sumi Talavera MD  PCP: Suzanne Chappell MD  Admission Date:   Admission Orders (From admission, onward)       Ordered         03/15/24 1900  Place in Observation  Once                          Discharge Date: 03/16/24    Consultations During Hospital Stay:  nil    Procedures Performed:   nil  Significant Findings / Test Results:   CT of the abdomen and pelvis with IV contrast showed no acute findings    Incidental Findings:   nil       Test Results Pending at Discharge (will require follow up):   nil     Outpatient Tests Requested:  nil    Complications:  nil    Reason for Admission: Nausea vomiting and abdominal pain    Hospital Course:   Miya Camarillo is a 58 y.o. female patient who originally presented to the hospital on 3/15/2024 due to abdominal pain.  Patient presented with nausea and vomiting and received Phenergan, Reglan and Zofran and Bentyl in the ED without much relief.  Patient also complained of headache, with history of migraine, received IV magnesium sulfate ,morphine and ketorolac and Imitrex with relief of headache but patient was concerned about bowel obstruction and a CT of the abdomen and pelvis was done which showed no acute findings no obstruction noted.  Patient usually has bowel movement every 10 days which is normal for her Since she has had multiple bowel surgeries in the past.  This morning was started on clear liquid diet and tolerated well and advance to soft diet and patient doing well no further vomiting or abdominal pain noted.  Patient is stable for discharge to follow-up as outpatient.        Please see above list of diagnoses and related plan for additional information.     Condition at Discharge: good    Discharge Day Visit / Exam:   Subjective: Patient for this better tolerating liquid diet and has been advanced to soft diet which patient has tolerated, patient states her last bowel movement was 10 days ago and that is normal for her   Vitals: Blood Pressure: 114/75 (03/16/24 0718)  Pulse: 75 (03/16/24 0718)  Temperature: 98.2 °F (36.8 °C) (03/16/24 0718)  Temp Source: Oral (03/16/24  "0718)  Respirations: 16 (03/16/24 0718)  Height: 5' 2.5\" (158.8 cm) (03/15/24 2005)  Weight - Scale: 78.9 kg (174 lb) (03/15/24 2005)  SpO2: 98 % (03/16/24 0855)  Exam:   Physical Exam   HEENT-PERRLA, moist oral mucosa  Neck-supple, no JVD elevation   Respiratory-equal air entry bilaterally, no rales or rhonchi  Cardiovascular system-S1, S2 heard, no murmur or gallops or rubs  Abdomen-soft, nontender, no guarding or rigidity, bowel sounds heard  Extremities-no pedal edema  Peripheral pulses palpable  Musculoskeletal-no contractures  Central nervous system-no acute focal neurological deficit ,no sensory or motor deficit noted.  Skin-no rash noted       Discussion with Family: Patient declined call to .     Discharge instructions/Information to patient and family:   See after visit summary for information provided to patient and family.      Provisions for Follow-Up Care:  See after visit summary for information related to follow-up care and any pertinent home health orders.      Mobility at time of Discharge:   Basic Mobility Inpatient Raw Score: 24  JH-HLM Goal: 8: Walk 250 feet or more  JH-HLM Achieved: 8: Walk 250 feet ot more  HLM Goal achieved. Continue to encourage appropriate mobility.     Disposition:   Home    Planned Readmission: nil     Discharge Statement:  I spent 45 minutes discharging the patient. This time was spent on the day of discharge. I had direct contact with the patient on the day of discharge. Greater than 50% of the total time was spent examining patient, answering all patient questions, arranging and discussing plan of care with patient as well as directly providing post-discharge instructions.  Additional time then spent on discharge activities.    Discharge Medications:  See after visit summary for reconciled discharge medications provided to patient and/or family.      **Please Note: This note may have been constructed using a voice recognition system**      "

## 2024-03-16 NOTE — RESPIRATORY THERAPY NOTE
"RT Protocol Note  Miya Camarillo 58 y.o. female MRN: [id number]  Unit/Bed#: -01 Encounter: [id number]    Assessment    Principal Problem:    Nausea & vomiting  Active Problems:    HTN (hypertension)    H/O bariatric surgery    Gastroesophageal reflux disease      Home Pulmonary Medications:  Symbicort BID  Xopenex TID  Albuteral HFA prn       Past Medical History:   Diagnosis Date    Anxiety     Asthma     Depression     Hypertension     Migraine     Rapid heart rate                     Objective    Physical Exam:   Assessment Type: Pre-treatment  General Appearance: Alert, Awake  Respiratory Pattern: Normal  Chest Assessment: Chest expansion symmetrical  Bilateral Breath Sounds: Diminished  Cough: None    Vitals:  Blood pressure 102/67, pulse 82, temperature 97.6 °F (36.4 °C), temperature source Tympanic, resp. rate 18, height 5' 2.5\" (1.588 m), weight 78.9 kg (174 lb), SpO2 99%.          Imaging and other studies: I have personally reviewed pertinent reports.            Plan    Respiratory Plan: Home Bronchodilator Patient pathway        Resp Comments: Pt assessed for Respiratory Protocol.BS diminished, SPO2 99% on room air. Pt with pulmonary history, however is currently admitted for vomiting. Will D/C atrovent as pt's does not take at home.   "

## 2024-03-16 NOTE — UTILIZATION REVIEW
"  Initial Clinical Review    Admission: Date/Time/Statement:   Admission Orders (From admission, onward)       Ordered        03/15/24 1900  Place in Observation  Once                     Orders Placed This Encounter   Procedures    Place in Observation     Standing Status:   Standing     Number of Occurrences:   1     Order Specific Question:   Level of Care     Answer:   Med Surg [16]     ED Arrival Information       Expected   -    Arrival   3/15/2024 15:32    Acuity   Urgent              Means of arrival   Walk-In    Escorted by   Self    Service   Hospitalist    Admission type   Emergency              Arrival complaint   CONSTIPATED  VOMITING          Chief Complaint   Patient presents with    Abdominal Pain     Pt presents to ED from home w/ abd pain for two hours, pt states she vomiting \"poop\".  Pt (+) of bowel obstruction     Initial Presentation:   59 y/o female with PMHx/ PSHx multiple abdominal surgeries including Gastric Bypass, Cholecystectomy, hysterectomy, hernia repair, also HTN, Asthma, GERD, anxiety - presents to [facility] ED on 3/15/24 2nd 3 1/2 hour history of sudden epigastric - abdominal pain with nausea and vomiting x 3. Also reports chronic constipation and that her last bowel movement was 10 days ago - took 2 OTC stool softeners this morning.    In ED - BP 89/38  Exam:  generalized abdominal tenderness.  CT showed no acute abnormalities, evidence of prior gastric bypass surgery.  Labs: wnl  ED Tx:  IVF NSS x 2L, IV Mag SO4, IVToradol x 2, IV Phenergan, IV Zofran, IV Reglan, IV Benadryl  Placed in Observation 3/15/24 at 1900 -    Nausea & vomiting  Patient with 2 hours of nausea and vomiting, received Phenergan, Reglan, Zofran, Bentyl in the ED without relief of nausea and vomiting.  Admitted for observation given refractory vomiting, at the time my examination patient is without nausea vomiting or abdominal pain  She endorses constipation stating that her last bowel movement 7 days ago, she " took 2 stool softeners earlier this morning.  Denies obstipation.  CT scan showed no acute abnormalities, evidence of prior gastric bypass surgery.  Plan  Will encourage liquid intake, continue IV fluids  Continue clear liquid diet  Continue to monitor for signs of small bowel obstruction.     H/O bariatric surgery  History of gastric bypass surgery in 2018, last EGD in January 2023 showed improving ulcer at the anastomosis site.  CT abdomen without any acute findings, gastric bypass surgery seen.     Gastroesophageal reflux disease  Last EDG 1/4/2023, showing hiatal hernia Schatzki's ring and improving ulcer of the anastomosis site from her gastric bypass surgery.  Follows with GI as outpatient.  Continue protonix 40mg     3/16/24 - DAY 2:          ED Triage Vitals   Temperature Pulse Respirations Blood Pressure SpO2   03/15/24 1542 03/15/24 1542 03/15/24 1542 03/15/24 1542 03/15/24 1542   (!) 97.4 °F (36.3 °C) 89 16 (!) 108/38 99 %      Temp Source Heart Rate Source Patient Position - Orthostatic VS BP Location FiO2 (%)   03/15/24 1542 03/15/24 1700 03/15/24 1700 03/15/24 1700 --   Oral Monitor Lying Left arm       Pain Score       03/15/24 1634       9          Wt Readings from Last 1 Encounters:   03/15/24 78.9 kg (174 lb)     Additional Vital Signs:         Pertinent Labs/Diagnostic Test Results:   CT abdomen pelvis with contrast   Final Result by Maicol Hargrove MD (03/15 1740)      No acute findings     Results from last 7 days   Lab Units 03/16/24  0553 03/15/24  1611   WBC Thousand/uL 5.23 7.99   HEMOGLOBIN g/dL 9.3* 11.6   HEMATOCRIT % 30.7* 37.9   PLATELETS Thousands/uL 276 369   NEUTROS ABS Thousands/µL 3.42 5.90     Results from last 7 days   Lab Units 03/16/24  0553 03/15/24  1611   SODIUM mmol/L 140 139   POTASSIUM mmol/L 3.8 4.0   CHLORIDE mmol/L 110* 104   CO2 mmol/L 25 26   ANION GAP mmol/L 5 9   BUN mg/dL 8 9   CREATININE mg/dL 0.65 0.80   EGFR ml/min/1.73sq m 98 82   CALCIUM mg/dL  7.9* 9.0   MAGNESIUM mg/dL 2.5  --      Results from last 7 days   Lab Units 03/16/24  0553 03/15/24  1611   AST U/L 20 10*   ALT U/L 16 11   ALK PHOS U/L 87 92   TOTAL PROTEIN g/dL 5.2* 6.9   ALBUMIN g/dL 2.8* 3.7   TOTAL BILIRUBIN mg/dL 0.24 0.25       Results from last 7 days   Lab Units 03/16/24  0553 03/15/24  1611   GLUCOSE RANDOM mg/dL 85 110     Results from last 7 days   Lab Units 03/15/24  1611   LIPASE u/L 26     ED Treatment:   Medication Administration from 03/15/2024 1531 to 03/15/2024 1956         Date/Time Order Dose Route Action     03/15/2024 1617 EDT ondansetron (ZOFRAN) injection 4 mg 4 mg Intravenous Given     03/15/2024 1730 EDT sodium chloride 0.9 % bolus 1,000 mL 0 mL Intravenous Stopped     03/15/2024 1613 EDT sodium chloride 0.9 % bolus 1,000 mL 1,000 mL Intravenous New Bag     03/15/2024 1634 EDT morphine injection 4 mg 4 mg Intravenous Given     03/15/2024 1700 EDT iohexol (OMNIPAQUE) 350 MG/ML injection (SINGLE-DOSE) 100 mL 100 mL Intravenous Given     03/15/2024 1757 EDT promethazine (PHENERGAN) injection 12.5 mg 12.5 mg Intravenous Given     03/15/2024 1759 EDT ketorolac (TORADOL) injection 15 mg 15 mg Intravenous Given     03/15/2024 1800 EDT dicyclomine (BENTYL) tablet 20 mg 20 mg Oral Given     03/15/2024 1902 EDT ketorolac (TORADOL) injection 15 mg 15 mg Intravenous Given     03/15/2024 1902 EDT metoclopramide (REGLAN) injection 10 mg 10 mg Intravenous Given     03/15/2024 1909 EDT diphenhydrAMINE (BENADRYL) injection 25 mg 25 mg Intravenous Given by Other     03/15/2024 1905 EDT SUMAtriptan (IMITREX) subcutaneous injection 6 mg 6 mg Subcutaneous Given     03/15/2024 1912 EDT magnesium sulfate 2 g/50 mL IVPB (premix) 2 g 2 g Intravenous New Bag     03/15/2024 1911 EDT sodium chloride 0.9 % bolus 1,000 mL 1,000 mL Intravenous New Bag          Past Medical History:   Diagnosis Date    Anxiety     Asthma     Depression     Hypertension     Migraine     Rapid heart rate      Present on  Admission:   Gastroesophageal reflux disease   HTN (hypertension)   Moderate asthma    Admitting Diagnosis: Abdominal pain [R10.9]  Intractable vomiting [R11.10]    Age/Sex: 58 y.o. female    Admission Orders:      Scheduled Medications:  budesonide-formoterol, 2 puff, Inhalation, BID  clonazePAM, 0.5 mg, Oral, Daily  FLUoxetine, 40 mg, Oral, Daily  pantoprazole, 40 mg, Oral, BID    Continuous IV Infusions:  NONE    PRN Meds:  albuterol, 1 puff, Inhalation, Q4H PRN  levalbuterol, 1.25 mg, Nebulization, Q8H PRN  metoclopramide, 10 mg, Intravenous, Q6H PRN    Network Utilization Review Department  ATTENTION: Please call with any questions or concerns to [phone] and carefully listen to the prompts so that you are directed to the right person. All voicemails are confidential.   For Discharge needs, contact Care Management DC Support Team at [phone] opt. 2  Send all requests for admission clinical reviews, approved or denied determinations and any other requests to dedicated fax number below belonging to the [place] where the patient is receiving treatment. List of dedicated fax numbers for the Facilities:  FACILITY NAME UR FAX NUMBER   ADMISSION DENIALS (Administrative/Medical Necessity) [phone]   DISCHARGE SUPPORT TEAM (NETWORK) [phone]   PARENT CHILD HEALTH (Maternity/NICU/Pediatrics) [phone]   [facility] [phone]   [facility] [phone]   [facility] [phone]   [facility] [phone]   [facility] [phone]   [facility] [phone]   [facility] [phone]   [facility] [phone]   [facility] [phone]   [facility] [phone]   [facility] [phone]   [facility]  [facility] [phone]

## 2024-03-16 NOTE — ASSESSMENT & PLAN NOTE
Continue Klonopin daily.  Blood pressure has been stable on admission.  Continue to monitor, hold Klonopin if BP is SBP <110

## 2024-03-16 NOTE — ASSESSMENT & PLAN NOTE
Patient with prior history, does not take any medication for this.  Blood pressure has been stable on admission.  Continue to monitor

## 2024-03-16 NOTE — PLAN OF CARE
Problem: PAIN - ADULT  Goal: Verbalizes/displays adequate comfort level or baseline comfort level  Description: Interventions:  - Encourage patient to monitor pain and request assistance  - Assess pain using appropriate pain scale  - Administer analgesics based on type and severity of pain and evaluate response  - Implement non-pharmacological measures as appropriate and evaluate response  - Consider cultural and social influences on pain and pain management  - Notify physician/advanced practitioner if interventions unsuccessful or patient reports new pain  Outcome: Progressing     Problem: PAIN - ADULT  Goal: Verbalizes/displays adequate comfort level or baseline comfort level  Description: Interventions:  - Encourage patient to monitor pain and request assistance  - Assess pain using appropriate pain scale  - Administer analgesics based on type and severity of pain and evaluate response  - Implement non-pharmacological measures as appropriate and evaluate response  - Consider cultural and social influences on pain and pain management  - Notify physician/advanced practitioner if interventions unsuccessful or patient reports new pain  3/16/2024 0914 by Ernestina García RN  Outcome: Progressing  3/16/2024 0913 by Ernestina García RN  Outcome: Progressing     Problem: Knowledge Deficit  Goal: Patient/family/caregiver demonstrates understanding of disease process, treatment plan, medications, and discharge instructions  Description: Complete learning assessment and assess knowledge base.  Interventions:  - Provide teaching at level of understanding  - Provide teaching via preferred learning methods  3/16/2024 0914 by Ernestina García RN  Outcome: Progressing  3/16/2024 0913 by Ernestina Gacría RN  Outcome: Progressing     Problem: GASTROINTESTINAL - ADULT  Goal: Minimal or absence of nausea and/or vomiting  Description: INTERVENTIONS:  - Administer IV fluids if ordered to ensure adequate hydration  - Maintain  NPO status until nausea and vomiting are resolved  - Nasogastric tube if ordered  - Administer ordered antiemetic medications as needed  - Provide nonpharmacologic comfort measures as appropriate  - Advance diet as tolerated, if ordered  - Consider nutrition services referral to assist patient with adequate nutrition and appropriate food choices  Outcome: Progressing  Goal: Maintains or returns to baseline bowel function  Description: INTERVENTIONS:  - Assess bowel function  - Encourage oral fluids to ensure adequate hydration  - Administer IV fluids if ordered to ensure adequate hydration  - Administer ordered medications as needed  - Encourage mobilization and activity  - Consider nutritional services referral to assist patient with adequate nutrition and appropriate food choices  Outcome: Progressing  Goal: Maintains adequate nutritional intake  Description: INTERVENTIONS:  - Monitor percentage of each meal consumed  - Identify factors contributing to decreased intake, treat as appropriate  - Assist with meals as needed  - Monitor I&O, weight, and lab values if indicated  - Obtain nutrition services referral as needed  Outcome: Progressing  Goal: Establish and maintain optimal ostomy function  Description: INTERVENTIONS:  - Assess bowel function  - Encourage oral fluids to ensure adequate hydration  - Administer IV fluids if ordered to ensure adequate hydration   - Administer ordered medications as needed  - Encourage mobilization and activity  - Nutrition services referral to assist patient with appropriate food choices  - Assess stoma site  - Consider wound care consult   Outcome: Progressing  Goal: Oral mucous membranes remain intact  Description: INTERVENTIONS  - Assess oral mucosa and hygiene practices  - Implement preventative oral hygiene regimen  - Implement oral medicated treatments as ordered  - Initiate Nutrition services referral as needed  Outcome: Progressing     Problem: METABOLIC, FLUID AND  ELECTROLYTES - ADULT  Goal: Electrolytes maintained within normal limits  Description: INTERVENTIONS:  - Monitor labs and assess patient for signs and symptoms of electrolyte imbalances  - Administer electrolyte replacement as ordered  - Monitor response to electrolyte replacements, including repeat lab results as appropriate  - Instruct patient on fluid and nutrition as appropriate  Outcome: Progressing  Goal: Fluid balance maintained  Description: INTERVENTIONS:  - Monitor labs   - Monitor I/O and WT  - Instruct patient on fluid and nutrition as appropriate  - Assess for signs & symptoms of volume excess or deficit  Outcome: Progressing  Goal: Glucose maintained within target range  Description: INTERVENTIONS:  - Monitor Blood Glucose as ordered  - Assess for signs and symptoms of hyperglycemia and hypoglycemia  - Administer ordered medications to maintain glucose within target range  - Assess nutritional intake and initiate nutrition service referral as needed  Outcome: Progressing

## 2024-03-16 NOTE — PLAN OF CARE

## 2024-03-16 NOTE — UTILIZATION REVIEW
NOTIFICATION OF OBSERVATION ADMISSION   AUTHORIZATION REQUEST   SERVICING FACILITY:   [place] [address]  Tax ID: [id number]  NPI: [id number] ATTENDING PROVIDER:  Attending Name and NPI#: Sumi Talavera Md [[id number]]  Address: [address] [place] [address]  Phone:  [phone]     ADMISSION INFORMATION:  Place of Service: [facility]  Place of Service Code: 22 CPT Code:   Admitting Diagnosis Code/Description:  Abdominal pain [R10.9]  Intractable vomiting [R11.10]  Observation Admission Date/Time: 03/15/2024 19:00PM  Discharge Date/Time: No discharge date for patient encounter.     UTILIZATION REVIEW CONTACT:  Maria Koch Utilization   Network Utilization Review Department  Phone: [phone]  Fax: [phone]  Email: [email or web address]  Contact for approvals/pending authorizations, clinical reviews, and discharge.     PHYSICIAN ADVISORY SERVICES:  Medical Necessity Denial & [facility] Review  Phone: [phone]  Fax: [phone]  Email: [email or web address]     DISCHARGE SUPPORT TEAM:  For Patients Discharge Needs & Updates  Phone: [phone] opt. 2 Fax: [phone]  Email: [email or web address]

## 2024-03-16 NOTE — DISCHARGE INSTR - AVS FIRST PAGE
Dear Miya Camarillo,     It was our pleasure to care for you here at [facility].  It is our hope that we were always able to exceed the expected standards for your care during your stay.  You were hospitalized due to nausea vomiting and abdominal pain, .  You were cared for on the medsur floor under the service of Sumi Talavera MD with the [facility] who covers for your primary care physician (PCP), Suzanne Chappell MD, while you were hospitalized.  If you have any questions or concerns related to this hospitalization, you may contact us at .  For follow up as well as medication refills, we recommend that you follow up with your primary care physician.  A registered nurse will reach out to you by phone within a few days after your discharge to answer any additional questions that you may have after going home.  However, at this time we provide for you here, the most important instructions / recommendations at discharge:     Notable Medication Adjustments -   Continue stool softeners, recommend high-fiber diet  Testing Required after Discharge -   nil  ** Please contact your PCP to request testing orders for any of the testing recommended here **  Important follow up information -   nil  Other Instructions -   With PCP and general surgery  Please review this entire after visit summary as additional general instructions including medication list, appointments, activity, diet, any pertinent wound care, and other additional recommendations from your care team that may be provided for you.      Sincerely,     Sumi Talavera MD

## 2024-03-16 NOTE — ASSESSMENT & PLAN NOTE
Last EDG 1/4/2023, showing hiatal hernia Schatzki's ring and improving ulcer of the anastomosis site from her gastric bypass surgery.  Follows with GI as outpatient.  Continue protonix 40mg

## 2024-03-16 NOTE — ASSESSMENT & PLAN NOTE
History of gastric bypass surgery in 2018, last EGD in January 2023 showed improving ulcer at the anastomosis site.  CT abdomen without any acute findings, gastric bypass surgery seen.

## 2024-06-13 ENCOUNTER — HOSPITAL ENCOUNTER (OUTPATIENT)
Dept: RADIOLOGY | Facility: HOSPITAL | Age: 59
Discharge: HOME | End: 2024-06-13
Payer: COMMERCIAL

## 2024-06-13 VITALS — HEIGHT: 63 IN | WEIGHT: 157 LBS | BODY MASS INDEX: 27.82 KG/M2

## 2024-06-13 DIAGNOSIS — Z12.31 ENCOUNTER FOR SCREENING MAMMOGRAM FOR MALIGNANT NEOPLASM OF BREAST: ICD-10-CM

## 2024-06-13 DIAGNOSIS — M81.0 AGE-RELATED OSTEOPOROSIS WITHOUT CURRENT PATHOLOGICAL FRACTURE: ICD-10-CM

## 2024-06-13 PROCEDURE — 77080 DXA BONE DENSITY AXIAL: CPT

## 2024-06-13 PROCEDURE — 77063 BREAST TOMOSYNTHESIS BI: CPT

## 2024-06-13 PROCEDURE — 77067 SCR MAMMO BI INCL CAD: CPT

## 2024-06-17 ENCOUNTER — APPOINTMENT (OUTPATIENT)
Dept: LAB | Facility: HOSPITAL | Age: 59
End: 2024-06-17
Payer: COMMERCIAL

## 2024-06-17 DIAGNOSIS — D64.9 ANEMIA, UNSPECIFIED TYPE: ICD-10-CM

## 2024-06-17 DIAGNOSIS — E78.5 HYPERLIPIDEMIA, UNSPECIFIED HYPERLIPIDEMIA TYPE: ICD-10-CM

## 2024-06-17 DIAGNOSIS — R73.09 IMPAIRED GLUCOSE TOLERANCE TEST: Primary | ICD-10-CM

## 2024-06-17 DIAGNOSIS — L63.9 ALOPECIA AREATA: ICD-10-CM

## 2024-06-17 LAB
ALBUMIN SERPL BCP-MCNC: 4 G/DL (ref 3.5–5)
ALP SERPL-CCNC: 96 U/L (ref 34–104)
ALT SERPL W P-5'-P-CCNC: 10 U/L (ref 7–52)
ANION GAP SERPL CALCULATED.3IONS-SCNC: 8 MMOL/L (ref 4–13)
AST SERPL W P-5'-P-CCNC: 13 U/L (ref 13–39)
BASOPHILS # BLD AUTO: 0.09 THOUSANDS/ÂΜL (ref 0–0.1)
BASOPHILS NFR BLD AUTO: 1 % (ref 0–1)
BILIRUB SERPL-MCNC: 0.42 MG/DL (ref 0.2–1)
BUN SERPL-MCNC: 11 MG/DL (ref 5–25)
CALCIUM SERPL-MCNC: 9.2 MG/DL (ref 8.4–10.2)
CHLORIDE SERPL-SCNC: 104 MMOL/L (ref 96–108)
CHOLEST SERPL-MCNC: 202 MG/DL
CO2 SERPL-SCNC: 26 MMOL/L (ref 21–32)
CREAT SERPL-MCNC: 0.85 MG/DL (ref 0.6–1.3)
EOSINOPHIL # BLD AUTO: 0.65 THOUSAND/ÂΜL (ref 0–0.61)
EOSINOPHIL NFR BLD AUTO: 9 % (ref 0–6)
ERYTHROCYTE [DISTWIDTH] IN BLOOD BY AUTOMATED COUNT: 15.9 % (ref 11.6–15.1)
FERRITIN SERPL-MCNC: 7 NG/ML (ref 11–307)
FOLATE SERPL-MCNC: 15.3 NG/ML
GFR SERPL CREATININE-BSD FRML MDRD: 76 ML/MIN/1.73SQ M
GLUCOSE P FAST SERPL-MCNC: 107 MG/DL (ref 65–99)
HCT VFR BLD AUTO: 42.2 % (ref 34.8–46.1)
HDLC SERPL-MCNC: 65 MG/DL
HGB BLD-MCNC: 12.7 G/DL (ref 11.5–15.4)
IMM GRANULOCYTES # BLD AUTO: 0.02 THOUSAND/UL (ref 0–0.2)
IMM GRANULOCYTES NFR BLD AUTO: 0 % (ref 0–2)
IRON SATN MFR SERPL: 9 % (ref 15–50)
IRON SERPL-MCNC: 40 UG/DL (ref 50–212)
LDLC SERPL CALC-MCNC: 119 MG/DL (ref 0–100)
LYMPHOCYTES # BLD AUTO: 1.75 THOUSANDS/ÂΜL (ref 0.6–4.47)
LYMPHOCYTES NFR BLD AUTO: 23 % (ref 14–44)
MCH RBC QN AUTO: 23.8 PG (ref 26.8–34.3)
MCHC RBC AUTO-ENTMCNC: 30.1 G/DL (ref 31.4–37.4)
MCV RBC AUTO: 79 FL (ref 82–98)
MONOCYTES # BLD AUTO: 0.33 THOUSAND/ÂΜL (ref 0.17–1.22)
MONOCYTES NFR BLD AUTO: 4 % (ref 4–12)
NEUTROPHILS # BLD AUTO: 4.68 THOUSANDS/ÂΜL (ref 1.85–7.62)
NEUTS SEG NFR BLD AUTO: 63 % (ref 43–75)
NONHDLC SERPL-MCNC: 137 MG/DL
NRBC BLD AUTO-RTO: 0 /100 WBCS
PLATELET # BLD AUTO: 445 THOUSANDS/UL (ref 149–390)
PMV BLD AUTO: 9.4 FL (ref 8.9–12.7)
POTASSIUM SERPL-SCNC: 4.2 MMOL/L (ref 3.5–5.3)
PROT SERPL-MCNC: 7.5 G/DL (ref 6.4–8.4)
RBC # BLD AUTO: 5.34 MILLION/UL (ref 3.81–5.12)
SODIUM SERPL-SCNC: 138 MMOL/L (ref 135–147)
TIBC SERPL-MCNC: 464 UG/DL (ref 250–450)
TRIGL SERPL-MCNC: 89 MG/DL
TSH SERPL DL<=0.05 MIU/L-ACNC: 2.65 UIU/ML (ref 0.45–4.5)
UIBC SERPL-MCNC: 424 UG/DL (ref 155–355)
VIT B12 SERPL-MCNC: 197 PG/ML (ref 180–914)
WBC # BLD AUTO: 7.52 THOUSAND/UL (ref 4.31–10.16)

## 2024-06-17 PROCEDURE — 85025 COMPLETE CBC W/AUTO DIFF WBC: CPT

## 2024-06-17 PROCEDURE — 80053 COMPREHEN METABOLIC PANEL: CPT

## 2024-06-17 PROCEDURE — 83036 HEMOGLOBIN GLYCOSYLATED A1C: CPT

## 2024-06-17 PROCEDURE — 83550 IRON BINDING TEST: CPT

## 2024-06-17 PROCEDURE — 83540 ASSAY OF IRON: CPT

## 2024-06-17 PROCEDURE — 82728 ASSAY OF FERRITIN: CPT

## 2024-06-17 PROCEDURE — 80061 LIPID PANEL: CPT

## 2024-06-17 PROCEDURE — 36415 COLL VENOUS BLD VENIPUNCTURE: CPT

## 2024-06-17 PROCEDURE — 82746 ASSAY OF FOLIC ACID SERUM: CPT

## 2024-06-17 PROCEDURE — 84443 ASSAY THYROID STIM HORMONE: CPT

## 2024-06-17 PROCEDURE — 82607 VITAMIN B-12: CPT

## 2024-06-17 PROCEDURE — 84630 ASSAY OF ZINC: CPT

## 2024-06-18 LAB
EST. AVERAGE GLUCOSE BLD GHB EST-MCNC: 111 MG/DL
HBA1C MFR BLD: 5.5 %

## 2024-06-19 LAB — ZINC SERPL-MCNC: 66 UG/DL (ref 44–115)

## 2024-06-20 ENCOUNTER — HOSPITAL ENCOUNTER (OUTPATIENT)
Dept: ULTRASOUND IMAGING | Facility: CLINIC | Age: 59
End: 2024-06-20
Payer: COMMERCIAL

## 2024-06-20 DIAGNOSIS — R92.8 ABNORMAL MAMMOGRAM: ICD-10-CM

## 2024-06-20 PROCEDURE — 76642 ULTRASOUND BREAST LIMITED: CPT

## 2024-12-10 ENCOUNTER — DOCUMENTATION (OUTPATIENT)
Dept: GASTROENTEROLOGY | Facility: CLINIC | Age: 59
End: 2024-12-10

## 2024-12-10 ENCOUNTER — TELEPHONE (OUTPATIENT)
Dept: GASTROENTEROLOGY | Facility: CLINIC | Age: 59
End: 2024-12-10

## 2024-12-10 NOTE — TELEPHONE ENCOUNTER
Pt is due for a colonoscopy in 2 years due to an inadequate bowel preparation and a personal history of colon polyps with Dr Fuentes. Order created.  I lmom for pt to please call back to schedule. Recall letter mailed.

## 2024-12-23 ENCOUNTER — HOSPITAL ENCOUNTER (OUTPATIENT)
Dept: ULTRASOUND IMAGING | Facility: CLINIC | Age: 59
Discharge: HOME | End: 2024-12-23
Payer: COMMERCIAL

## 2024-12-23 DIAGNOSIS — R92.8 ABNORMAL MAMMOGRAM: ICD-10-CM

## 2024-12-23 PROCEDURE — 76642 ULTRASOUND BREAST LIMITED: CPT

## 2025-01-29 ENCOUNTER — HOSPITAL ENCOUNTER (EMERGENCY)
Facility: HOSPITAL | Age: 60
Discharge: HOME | End: 2025-01-29
Attending: EMERGENCY MEDICINE | Admitting: EMERGENCY MEDICINE
Payer: COMMERCIAL

## 2025-01-29 ENCOUNTER — APPOINTMENT (EMERGENCY)
Dept: RADIOLOGY | Facility: HOSPITAL | Age: 60
End: 2025-01-29
Payer: COMMERCIAL

## 2025-01-29 VITALS
OXYGEN SATURATION: 100 % | DIASTOLIC BLOOD PRESSURE: 60 MMHG | HEART RATE: 108 BPM | RESPIRATION RATE: 18 BRPM | TEMPERATURE: 98.4 F | WEIGHT: 165 LBS | SYSTOLIC BLOOD PRESSURE: 125 MMHG | BODY MASS INDEX: 29.7 KG/M2

## 2025-01-29 DIAGNOSIS — J45.21 MILD INTERMITTENT ASTHMA WITH EXACERBATION: ICD-10-CM

## 2025-01-29 DIAGNOSIS — J10.1 INFLUENZA A: Primary | ICD-10-CM

## 2025-01-29 LAB
ALBUMIN SERPL BCG-MCNC: 3.9 G/DL (ref 3.5–5)
ALP SERPL-CCNC: 80 U/L (ref 34–104)
ALT SERPL W P-5'-P-CCNC: 13 U/L (ref 7–52)
ANION GAP SERPL CALCULATED.3IONS-SCNC: 8 MMOL/L (ref 4–13)
AST SERPL W P-5'-P-CCNC: 16 U/L (ref 13–39)
ATRIAL RATE: 96 BPM
BASOPHILS # BLD AUTO: 0.02 THOUSANDS/ΜL (ref 0–0.1)
BASOPHILS NFR BLD AUTO: 1 % (ref 0–1)
BILIRUB SERPL-MCNC: 0.43 MG/DL (ref 0.2–1)
BUN SERPL-MCNC: 11 MG/DL (ref 5–25)
CALCIUM SERPL-MCNC: 8.7 MG/DL (ref 8.4–10.2)
CHLORIDE SERPL-SCNC: 103 MMOL/L (ref 96–108)
CO2 SERPL-SCNC: 24 MMOL/L (ref 21–32)
CREAT SERPL-MCNC: 0.82 MG/DL (ref 0.6–1.3)
EOSINOPHIL # BLD AUTO: 0.07 THOUSAND/ΜL (ref 0–0.61)
EOSINOPHIL NFR BLD AUTO: 2 % (ref 0–6)
ERYTHROCYTE [DISTWIDTH] IN BLOOD BY AUTOMATED COUNT: 17.3 % (ref 11.6–15.1)
FLUAV RNA RESP QL NAA+PROBE: POSITIVE
FLUBV RNA RESP QL NAA+PROBE: NEGATIVE
GFR SERPL CREATININE-BSD FRML MDRD: 79 ML/MIN/1.73SQ M
GLUCOSE SERPL-MCNC: 120 MG/DL (ref 65–140)
HCT VFR BLD AUTO: 37.8 % (ref 34.8–46.1)
HGB BLD-MCNC: 11.3 G/DL (ref 11.5–15.4)
IMM GRANULOCYTES # BLD AUTO: 0.01 THOUSAND/UL (ref 0–0.2)
IMM GRANULOCYTES NFR BLD AUTO: 0 % (ref 0–2)
LYMPHOCYTES # BLD AUTO: 0.55 THOUSANDS/ΜL (ref 0.6–4.47)
LYMPHOCYTES NFR BLD AUTO: 13 % (ref 14–44)
MCH RBC QN AUTO: 22.6 PG (ref 26.8–34.3)
MCHC RBC AUTO-ENTMCNC: 29.9 G/DL (ref 31.4–37.4)
MCV RBC AUTO: 76 FL (ref 82–98)
MONOCYTES # BLD AUTO: 0.39 THOUSAND/ΜL (ref 0.17–1.22)
MONOCYTES NFR BLD AUTO: 9 % (ref 4–12)
NEUTROPHILS # BLD AUTO: 3.12 THOUSANDS/ΜL (ref 1.85–7.62)
NEUTS SEG NFR BLD AUTO: 75 % (ref 43–75)
NRBC BLD AUTO-RTO: 0 /100 WBCS
P AXIS: 72 DEGREES
PLATELET # BLD AUTO: 247 THOUSANDS/UL (ref 149–390)
PMV BLD AUTO: 9.6 FL (ref 8.9–12.7)
POTASSIUM SERPL-SCNC: 3.7 MMOL/L (ref 3.5–5.3)
PR INTERVAL: 142 MS
PROT SERPL-MCNC: 7 G/DL (ref 6.4–8.4)
QRS AXIS: 64 DEGREES
QRSD INTERVAL: 78 MS
QT INTERVAL: 350 MS
QTC INTERVAL: 442 MS
RBC # BLD AUTO: 4.99 MILLION/UL (ref 3.81–5.12)
RSV RNA RESP QL NAA+PROBE: NEGATIVE
SARS-COV-2 RNA RESP QL NAA+PROBE: NEGATIVE
SODIUM SERPL-SCNC: 135 MMOL/L (ref 135–147)
T WAVE AXIS: 68 DEGREES
VENTRICULAR RATE: 96 BPM
WBC # BLD AUTO: 4.16 THOUSAND/UL (ref 4.31–10.16)

## 2025-01-29 PROCEDURE — 93005 ELECTROCARDIOGRAM TRACING: CPT

## 2025-01-29 PROCEDURE — 0241U HB NFCT DS VIR RESP RNA 4 TRGT: CPT | Performed by: PHYSICIAN ASSISTANT

## 2025-01-29 PROCEDURE — 85025 COMPLETE CBC W/AUTO DIFF WBC: CPT | Performed by: PHYSICIAN ASSISTANT

## 2025-01-29 PROCEDURE — 71046 X-RAY EXAM CHEST 2 VIEWS: CPT

## 2025-01-29 PROCEDURE — 99285 EMERGENCY DEPT VISIT HI MDM: CPT

## 2025-01-29 PROCEDURE — 93010 ELECTROCARDIOGRAM REPORT: CPT | Performed by: INTERNAL MEDICINE

## 2025-01-29 PROCEDURE — 36415 COLL VENOUS BLD VENIPUNCTURE: CPT | Performed by: PHYSICIAN ASSISTANT

## 2025-01-29 PROCEDURE — 99284 EMERGENCY DEPT VISIT MOD MDM: CPT | Performed by: PHYSICIAN ASSISTANT

## 2025-01-29 PROCEDURE — 80053 COMPREHEN METABOLIC PANEL: CPT | Performed by: PHYSICIAN ASSISTANT

## 2025-01-29 PROCEDURE — 94640 AIRWAY INHALATION TREATMENT: CPT

## 2025-01-29 RX ORDER — ALBUTEROL SULFATE 0.83 MG/ML
2.5 SOLUTION RESPIRATORY (INHALATION) EVERY 6 HOURS PRN
Qty: 75 ML | Refills: 0 | Status: SHIPPED | OUTPATIENT
Start: 2025-01-29

## 2025-01-29 RX ORDER — ALBUTEROL SULFATE 0.83 MG/ML
SOLUTION RESPIRATORY (INHALATION)
Status: COMPLETED
Start: 2025-01-29 | End: 2025-01-29

## 2025-01-29 RX ORDER — OSELTAMIVIR PHOSPHATE 75 MG/1
75 CAPSULE ORAL EVERY 12 HOURS
Qty: 10 CAPSULE | Refills: 0 | Status: SHIPPED | OUTPATIENT
Start: 2025-01-29 | End: 2025-02-03

## 2025-01-29 RX ORDER — PREDNISONE 20 MG/1
60 TABLET ORAL ONCE
Status: COMPLETED | OUTPATIENT
Start: 2025-01-29 | End: 2025-01-29

## 2025-01-29 RX ORDER — ALBUTEROL SULFATE 0.83 MG/ML
5 SOLUTION RESPIRATORY (INHALATION) ONCE
Status: COMPLETED | OUTPATIENT
Start: 2025-01-29 | End: 2025-01-29

## 2025-01-29 RX ADMIN — ALBUTEROL SULFATE 5 MG: 2.5 SOLUTION RESPIRATORY (INHALATION) at 10:23

## 2025-01-29 RX ADMIN — IPRATROPIUM BROMIDE 0.5 MG: 0.5 SOLUTION RESPIRATORY (INHALATION) at 10:23

## 2025-01-29 RX ADMIN — PREDNISONE 60 MG: 20 TABLET ORAL at 10:22

## 2025-01-29 NOTE — ED PROVIDER NOTES
Time reflects when diagnosis was documented in both MDM as applicable and the Disposition within this note       Time User Action Codes Description Comment    1/29/2025 11:42 AM Margy Levin [J10.1] Influenza A     1/29/2025 11:43 AM Margy Levin [J45.21] Mild intermittent asthma with exacerbation           ED Disposition       ED Disposition   Discharge    Condition   Stable    Date/Time   Wed Jan 29, 2025 11:42 AM    Comment   Miya Camarillo discharge to home/self care.                   Assessment & Plan       Medical Decision Making  Patient with flulike symptoms, wheezing, history of asthma  Positive for flu given steroids and neb treatment.  Patient feeling better stable for discharge outpatient follow-up    Amount and/or Complexity of Data Reviewed  Labs: ordered. Decision-making details documented in ED Course.  Radiology: ordered.    Risk  Prescription drug management.        ED Course as of 01/29/25 1507   Wed Jan 29, 2025   1049 EKG: NSR @ 96, no acute ischemic changes   1120 CMP unremarkable   1120 CBC unremarkable   1121 INFLU A PCR(!): Positive       Medications   predniSONE tablet 60 mg (60 mg Oral Given 1/29/25 1022)   albuterol inhalation solution 5 mg (5 mg Nebulization Given 1/29/25 1023)   ipratropium (ATROVENT) 0.02 % inhalation solution 0.5 mg (0.5 mg Nebulization Given 1/29/25 1023)   albuterol (2.5 mg/3 mL) 0.083 % inhalation solution **ADS Override Pull** ( Nebulization Override Pull 1/29/25 1040)       ED Risk Strat Scores                          SBIRT 20yo+      Flowsheet Row Most Recent Value   Initial Alcohol Screen: US AUDIT-C     1. How often do you have a drink containing alcohol? 0 Filed at: 01/29/2025 0948   2. How many drinks containing alcohol do you have on a typical day you are drinking?  0 Filed at: 01/29/2025 0948   3b. FEMALE Any Age, or MALE 65+: How often do you have 4 or more drinks on one occassion? 0 Filed at: 01/29/2025 0948   Audit-C Score 0 Filed at:  2025 0948   SAMUEL: How many times in the past year have you...    Used an illegal drug or used a prescription medication for non-medical reasons? Never Filed at: 2025 0948                            History of Present Illness       Chief Complaint   Patient presents with    Shortness of Breath     Started Tuesday with URI, now feeling it in her chest.  Hx asthma.  BREO at home.         Past Medical History:   Diagnosis Date    Anxiety     Asthma     Depression     Hypertension     Migraine     Rapid heart rate       Past Surgical History:   Procedure Laterality Date    BARIATRIC SURGERY      CHOLECYSTECTOMY      GASTRIC BYPASS  2018    also had repair of perforated bowel afterwards    HERNIA REPAIR      HYSTERECTOMY      LUNG SURGERY      [place] REPAIR FIRST ABDOMINAL WALL HERNIA N/A 2022    Procedure: OPEN INCISIONAL HERNIA REPAIR WITH MESH;  Surgeon: Bill Baxter MD;  Location: [facility] OR;  Service: General    UPPER GASTROINTESTINAL ENDOSCOPY        Family History   Problem Relation Age of Onset    Cancer Mother         liver    Diabetes Father     Lung cancer Father     No Known Problems Sister     No Known Problems Sister     No Known Problems Sister     No Known Problems Daughter     No Known Problems Maternal Grandmother     No Known Problems Maternal Grandfather     No Known Problems Paternal Grandmother     No Known Problems Paternal Grandfather     No Known Problems Son     No Known Problems Maternal Aunt     No Known Problems Maternal Aunt     No Known Problems Paternal Aunt       Social History     Tobacco Use    Smoking status: Former     Current packs/day: 0.00     Types: Cigarettes     Quit date:      Years since quittin.1    Smokeless tobacco: Never   Vaping Use    Vaping status: Never Used   Substance Use Topics    Alcohol use: Yes     Comment: Occ socially    Drug use: Not Currently     Types: Cocaine     Comment: last cocain use       E-Cigarette/Vaping    E-Cigarette  Use Never User       E-Cigarette/Vaping Substances    Nicotine No     THC No     CBD No     Flavoring No     Other No     Unknown No       I have reviewed and agree with the history as documented.     HPI: Patient is a 59 y.o. female who presents with 2 days of tactile fever/afebrile noted here, chills, cough, wheezing, non-bloody diarrhea, rhinorrhea which the patient describes at mild   The patient has had contact with people with similar symptoms.    The patient has not taken any medication.  PT does have nebulizer machine at home      -- Penicillins -- Shortness Of Breath   -- Latex -- Hives   -- Other -- Other (See Comments)    --   STEROIDS due to Bypass    Past Medical History: Anxiety, Asthma, Depression, HTN, Migraine,   Past Surgical History:, BARIATRIC SURGERY, CHOLECYSTECTOMY, GASTRIC BYPASS, HERNIA REPAIR,  HYSTERECTOMY,  LUNG SURGERY, 2022: [place] REPAIR FIRST ABDOMINAL WALL HERNIA;  OPEN INCISIONAL HERNIA REPAIR WITH MESH;  ,       Social History    Tobacco Use      Smoking status: Former        Packs/day: 0.00        Types: Cigarettes        Quit date:         Years since quittin.1      Smokeless tobacco: Never    Vaping Use      Vaping status: Never Used    Alcohol use: Yes      Comment: Occ socially    Drug use: Not Currently      Types: Cocaine      Comment: last cocain use       Nursing notes reviewed  Physical Exam:  ED Triage Vitals [25 0948]  Temperature: 98.4 °F (36.9 °C)  Pulse: (!) 108  Respirations: 20  Blood Pressure: (!) 138/109  SpO2: 99 %  Temp src: n/a  Heart Rate Source: Monitor  Patient Position - Orthostatic VS: Sitting  BP Location: Left arm  FiO2 (%): n/a  Pain Score: No Pain    ROS: Positive for fever, chills, cough, wheezing, non-bloody diarrhea, rhinorrhea, the remainder of a 10 organ system ROS was otherwise unremarkable.      General: awake, alert, mild distress  Head: normocephalic, atraumatic  Eyes: no scleral icterus  Nose: external exam grossly  normal, positive nasal discharge  Neck: symmetric, No JVD noted, trachea midline  Pulmonary: no respiratory distress, no tachypnea noted, exp wheezing B/L bases  Cardiovascular: appears well perfused  Abdomen: no distention noted, non tender  Musculoskeletal: no deformities noted, tone normal  Neuro: grossly non-focal  Psych: mood and affect appropriate    The patient is stable and has a history and physical exam consistent with a viral illness, also considered rhinosinusitis, pneumonia, bronchitis, asthma exacerbation.   testing has been performed.    I considered the patient's other medical conditions as applicable/noted above in my medical decision making.  The patient is stable upon discharge. The plan is for supportive care at home.    The patient (and any family present) verbalized understanding of the discharge instructions and warnings that would necessitate return to the Emergency Department.  All questions were answered prior to discharge.    Medications - No data to display  Final diagnoses:  None  ED Disposition     None      Follow-up Information    None     Patient's Medications    Discharge Prescriptions  No medications on file    No discharge procedures on file.    Electronically Signed by             Review of Systems        Objective       ED Triage Vitals [01/29/25 0948]   Temperature Pulse Blood Pressure Respirations SpO2 Patient Position - Orthostatic VS   98.4 °F (36.9 °C) (!) 108 (!) 138/109 20 99 % Sitting      Temp src Heart Rate Source BP Location FiO2 (%) Pain Score    -- Monitor Left arm -- No Pain      Vitals      Date and Time Temp Pulse SpO2 Resp BP Pain Score FACES Pain Rating User   01/29/25 1130 -- 108 100 % 18 125/60 -- -- JK   01/29/25 0948 98.4 °F (36.9 °C) 108 99 % 20 138/109 No Pain -- RR            Physical Exam    Results Reviewed       Procedure Component Value Units Date/Time    Comprehensive metabolic panel [[id number]] Collected: 01/29/25 1032    Lab Status: Final result  Specimen: Blood from Arm, Right Updated: 01/29/25 1111     Sodium 135 mmol/L      Potassium 3.7 mmol/L      Chloride 103 mmol/L      CO2 24 mmol/L      ANION GAP 8 mmol/L      BUN 11 mg/dL      Creatinine 0.82 mg/dL      Glucose 120 mg/dL      Calcium 8.7 mg/dL      AST 16 U/L      ALT 13 U/L      Alkaline Phosphatase 80 U/L      Total Protein 7.0 g/dL      Albumin 3.9 g/dL      Total Bilirubin 0.43 mg/dL      eGFR 79 ml/min/1.73sq m     Narrative:      National Kidney Disease Foundation guidelines for Chronic Kidney Disease (CKD):     Stage 1 with normal or high GFR (GFR > 90 mL/min/1.73 square meters)    Stage 2 Mild CKD (GFR = 60-89 mL/min/1.73 square meters)    Stage 3A Moderate CKD (GFR = 45-59 mL/min/1.73 square meters)    Stage 3B Moderate CKD (GFR = 30-44 mL/min/1.73 square meters)    Stage 4 Severe CKD (GFR = 15-29 mL/min/1.73 square meters)    Stage 5 End Stage CKD (GFR <15 mL/min/1.73 square meters)  Note: GFR calculation is accurate only with a steady state creatinine    FLU/RSV/COVID - if FLU/RSV clinically relevant (2hr TAT) [[id number]]  (Abnormal) Collected: 01/29/25 1020    Lab Status: Final result Specimen: Nares from Nose Updated: 01/29/25 1110     SARS-CoV-2 Negative     INFLUENZA A PCR Positive     INFLUENZA B PCR Negative     RSV PCR Negative    Narrative:      This test has been performed using the CoV-2/Flu/RSV plus assay on the [employer] GeneXpert platform. This test has been validated by the  and verified by the performing laboratory.     This test is designed to amplify and detect the following: nucleocapsid (N), envelope (E), and RNA-dependent RNA polymerase (RdRP) genes of the SARS-CoV-2 genome; matrix (M), basic polymerase (PB2), and acidic protein (PA) segments of the influenza A genome; matrix (M) and non-structural protein (NS) segments of the influenza B genome, and the nucleocapsid genes of RSV A and RSV B.     Positive results are indicative of the presence of Flu A, Flu  "B, RSV, and/or SARS-CoV-2 RNA. Positive results for SARS-CoV-2 or suspected novel influenza should be reported to state, local, or federal health departments according to local reporting requirements.      All results should be assessed in conjunction with clinical presentation and other laboratory markers for clinical management.     FOR PEDIATRIC PATIENTS - copy/paste COVID Guidelines URL to browser: [email or web address]       CBC and differential [[id number]]  (Abnormal) Collected: 01/29/25 1032    Lab Status: Final result Specimen: Blood from Arm, Right Updated: 01/29/25 1047     WBC 4.16 Thousand/uL      RBC 4.99 Million/uL      Hemoglobin 11.3 g/dL      Hematocrit 37.8 %      MCV 76 fL      MCH 22.6 pg      MCHC 29.9 g/dL      RDW 17.3 %      MPV 9.6 fL      Platelets 247 Thousands/uL      nRBC 0 /100 WBCs      Segmented % 75 %      Immature Grans % 0 %      Lymphocytes % 13 %      Monocytes % 9 %      Eosinophils Relative 2 %      Basophils Relative 1 %      Absolute Neutrophils 3.12 Thousands/µL      Absolute Immature Grans 0.01 Thousand/uL      Absolute Lymphocytes 0.55 Thousands/µL      Absolute Monocytes 0.39 Thousand/µL      Eosinophils Absolute 0.07 Thousand/µL      Basophils Absolute 0.02 Thousands/µL             XR chest 2 views   Final Interpretation by Henry Nunez MD (01/29 1117)      No acute cardiopulmonary disease.            Workstation performed: [id number]             Procedures    ED Medication and Procedure Management   Prior to Admission Medications   Prescriptions Last Dose Informant Patient Reported? Taking?   B-D 3CC LUER-ARACELIS SYR 25GX1/2\" 25G X 1-1/2\" 3 ML MISC   No No   Sig: USE FOR TORADOL INTRAMUSCULAR INJECTIONS   FLUoxetine (PROzac) 40 MG capsule  Self Yes No   Sig: TAKE 2 CAPSULES BY MOUTH EVERY DAY IN THE MORNING   FeroSul 325 (65 Fe) MG tablet   Yes No   Syringe/Needle, Disp, (SYRINGE 3CC/41JN9-6/4\") 27G X 1-1/4\" 3 ML " "MISC   No No   Sig: Use for Toradol intramuscular injections.   albuterol (PROVENTIL HFA,VENTOLIN HFA) 90 mcg/act inhaler   Yes No   budesonide-formoterol (SYMBICORT) 160-4.5 mcg/act inhaler  Self Yes No   Sig: Inhale 2 puffs 2 (two) times a day Morning and evening   clonazePAM (KlonoPIN) 0.5 mg tablet  Self Yes No   Sig: Take 0.5 mg by mouth daily   ipratropium (ATROVENT) 0.02 % nebulizer solution   No No   Sig: Take 2.5 mL (0.5 mg total) by nebulization 3 (three) times a day   lamoTRIgine (LaMICtal) 25 mg tablet   Yes No   Sig: Take 50 mg by mouth 2 (two) times a day   levalbuterol (XOPENEX) 1.25 mg/3 mL nebulizer solution   No No   Sig: Take 3 mL (1.25 mg total) by nebulization 3 (three) times a day   pantoprazole (PROTONIX) 40 mg tablet   No No   Sig: Take 1 tablet (40 mg total) by mouth 2 (two) times a day      Facility-Administered Medications: None     Discharge Medication List as of 1/29/2025 11:47 AM        START taking these medications    Details   albuterol (2.5 mg/3 mL) 0.083 % nebulizer solution Take 3 mL (2.5 mg total) by nebulization every 6 (six) hours as needed for wheezing or shortness of breath, Starting Wed 1/29/2025, Normal      oseltamivir (TAMIFLU) 75 mg capsule Take 1 capsule (75 mg total) by mouth every 12 (twelve) hours for 5 days, Starting Wed 1/29/2025, Until Mon 2/3/2025, Normal           CONTINUE these medications which have NOT CHANGED    Details   albuterol (PROVENTIL HFA,VENTOLIN HFA) 90 mcg/act inhaler Starting Wed 5/17/2023, Historical Med      B-D 3CC LUER-ARACELIS SYR 25GX1/2\" 25G X 1-1/2\" 3 ML MISC USE FOR TORADOL INTRAMUSCULAR INJECTIONS, Normal      budesonide-formoterol (SYMBICORT) 160-4.5 mcg/act inhaler Inhale 2 puffs 2 (two) times a day Morning and evening, Starting Sun 11/7/2021, Historical Med      clonazePAM (KlonoPIN) 0.5 mg tablet Take 0.5 mg by mouth daily, Historical Med      FeroSul 325 (65 Fe) MG tablet Starting Wed 3/1/2023, Historical Med      FLUoxetine (PROzac) 40 " "MG capsule TAKE 2 CAPSULES BY MOUTH EVERY DAY IN THE MORNING, Historical Med      ipratropium (ATROVENT) 0.02 % nebulizer solution Take 2.5 mL (0.5 mg total) by nebulization 3 (three) times a day, Starting Wed 12/13/2023, Normal      lamoTRIgine (LaMICtal) 25 mg tablet Take 50 mg by mouth 2 (two) times a day, Starting Fri 2/17/2023, Historical Med      levalbuterol (XOPENEX) 1.25 mg/3 mL nebulizer solution Take 3 mL (1.25 mg total) by nebulization 3 (three) times a day, Starting Wed 12/13/2023, Normal      pantoprazole (PROTONIX) 40 mg tablet Take 1 tablet (40 mg total) by mouth 2 (two) times a day, Starting Fri 2/17/2023, Normal      Syringe/Needle, Disp, (SYRINGE 3CC/19BO7-3/4\") 27G X 1-1/4\" 3 ML MISC Use for Toradol intramuscular injections., Normal           No discharge procedures on file.  ED SEPSIS DOCUMENTATION   Time reflects when diagnosis was documented in both MDM as applicable and the Disposition within this note       Time User Action Codes Description Comment    1/29/2025 11:42 AM Margy Levin [J10.1] Influenza A     1/29/2025 11:43 AM Margy Levin [J45.21] Mild intermittent asthma with exacerbation                  Margy Levin PA-C  01/29/25 1507    "

## 2025-01-29 NOTE — DISCHARGE INSTRUCTIONS
Use Tylenol every 4 hours or Motrin every 6 hours; you can alternate the 2 medications taking something every 3 hours for pain or fever.    Take all oral antivirals until done.    Use Albuterol nebulized treatments at needed for cough, wheezing, asthma.  Follow-up with your doctor in next few days.

## 2025-05-31 ENCOUNTER — APPOINTMENT (OUTPATIENT)
Dept: LAB | Facility: HOSPITAL | Age: 60
End: 2025-05-31
Payer: COMMERCIAL

## 2025-05-31 ENCOUNTER — APPOINTMENT (EMERGENCY)
Dept: RADIOLOGY | Facility: HOSPITAL | Age: 60
End: 2025-05-31
Payer: COMMERCIAL

## 2025-05-31 ENCOUNTER — RESULTS FOLLOW-UP (OUTPATIENT)
Dept: EMERGENCY DEPT | Facility: HOSPITAL | Age: 60
End: 2025-05-31

## 2025-05-31 ENCOUNTER — HOSPITAL ENCOUNTER (EMERGENCY)
Facility: HOSPITAL | Age: 60
Discharge: HOME | End: 2025-05-31
Attending: EMERGENCY MEDICINE | Admitting: EMERGENCY MEDICINE
Payer: COMMERCIAL

## 2025-05-31 VITALS
OXYGEN SATURATION: 99 % | SYSTOLIC BLOOD PRESSURE: 134 MMHG | DIASTOLIC BLOOD PRESSURE: 67 MMHG | RESPIRATION RATE: 16 BRPM | HEART RATE: 73 BPM | TEMPERATURE: 98.1 F

## 2025-05-31 DIAGNOSIS — E78.2 MIXED HYPERLIPIDEMIA: ICD-10-CM

## 2025-05-31 DIAGNOSIS — R73.01 IMPAIRED FASTING GLUCOSE: ICD-10-CM

## 2025-05-31 DIAGNOSIS — I10 ESSENTIAL HYPERTENSION, MALIGNANT: ICD-10-CM

## 2025-05-31 DIAGNOSIS — D64.9 ANEMIA: Primary | ICD-10-CM

## 2025-05-31 DIAGNOSIS — R07.9 CHEST PAIN: ICD-10-CM

## 2025-05-31 DIAGNOSIS — M79.604 RIGHT LEG PAIN: ICD-10-CM

## 2025-05-31 DIAGNOSIS — E61.1 IRON DEFICIENCY: ICD-10-CM

## 2025-05-31 DIAGNOSIS — L63.9 ALOPECIA AREATA: ICD-10-CM

## 2025-05-31 LAB
ALBUMIN SERPL BCG-MCNC: 4.2 G/DL (ref 3.5–5)
ALP SERPL-CCNC: 87 U/L (ref 34–104)
ALT SERPL W P-5'-P-CCNC: 16 U/L (ref 7–52)
ANION GAP SERPL CALCULATED.3IONS-SCNC: 7 MMOL/L (ref 4–13)
ANION GAP SERPL CALCULATED.3IONS-SCNC: 8 MMOL/L (ref 4–13)
AST SERPL W P-5'-P-CCNC: 15 U/L (ref 13–39)
BASOPHILS # BLD AUTO: 0.05 THOUSANDS/ÂΜL (ref 0–0.1)
BASOPHILS # BLD AUTO: 0.06 THOUSANDS/ÂΜL (ref 0–0.1)
BASOPHILS NFR BLD AUTO: 1 % (ref 0–1)
BASOPHILS NFR BLD AUTO: 1 % (ref 0–1)
BILIRUB SERPL-MCNC: 0.45 MG/DL (ref 0.2–1)
BUN SERPL-MCNC: 15 MG/DL (ref 5–25)
BUN SERPL-MCNC: 16 MG/DL (ref 5–25)
CALCIUM SERPL-MCNC: 8.8 MG/DL (ref 8.4–10.2)
CALCIUM SERPL-MCNC: 8.8 MG/DL (ref 8.4–10.2)
CARDIAC TROPONIN I PNL SERPL HS: 3 NG/L (ref ?–50)
CHLORIDE SERPL-SCNC: 105 MMOL/L (ref 96–108)
CHLORIDE SERPL-SCNC: 105 MMOL/L (ref 96–108)
CHOLEST SERPL-MCNC: 218 MG/DL (ref ?–200)
CK SERPL-CCNC: 98 U/L (ref 26–192)
CO2 SERPL-SCNC: 25 MMOL/L (ref 21–32)
CO2 SERPL-SCNC: 25 MMOL/L (ref 21–32)
CREAT SERPL-MCNC: 0.84 MG/DL (ref 0.6–1.3)
CREAT SERPL-MCNC: 0.9 MG/DL (ref 0.6–1.3)
D DIMER PPP FEU-MCNC: 0.4 UG/ML FEU
EOSINOPHIL # BLD AUTO: 0.32 THOUSAND/ÂΜL (ref 0–0.61)
EOSINOPHIL # BLD AUTO: 0.33 THOUSAND/ÂΜL (ref 0–0.61)
EOSINOPHIL NFR BLD AUTO: 6 % (ref 0–6)
EOSINOPHIL NFR BLD AUTO: 6 % (ref 0–6)
ERYTHROCYTE [DISTWIDTH] IN BLOOD BY AUTOMATED COUNT: 17.4 % (ref 11.6–15.1)
ERYTHROCYTE [DISTWIDTH] IN BLOOD BY AUTOMATED COUNT: 17.6 % (ref 11.6–15.1)
EST. AVERAGE GLUCOSE BLD GHB EST-MCNC: 117 MG/DL
FERRITIN SERPL-MCNC: 5 NG/ML (ref 30–307)
GFR SERPL CREATININE-BSD FRML MDRD: 70 ML/MIN/1.73SQ M
GFR SERPL CREATININE-BSD FRML MDRD: 76 ML/MIN/1.73SQ M
GLUCOSE P FAST SERPL-MCNC: 110 MG/DL (ref 65–99)
GLUCOSE SERPL-MCNC: 96 MG/DL (ref 65–140)
HBA1C MFR BLD: 5.7 %
HCT VFR BLD AUTO: 36.3 % (ref 34.8–46.1)
HCT VFR BLD AUTO: 40.1 % (ref 34.8–46.1)
HDLC SERPL-MCNC: 82 MG/DL
HGB BLD-MCNC: 10.8 G/DL (ref 11.5–15.4)
HGB BLD-MCNC: 11.7 G/DL (ref 11.5–15.4)
IMM GRANULOCYTES # BLD AUTO: 0.01 THOUSAND/UL (ref 0–0.2)
IMM GRANULOCYTES # BLD AUTO: 0.02 THOUSAND/UL (ref 0–0.2)
IMM GRANULOCYTES NFR BLD AUTO: 0 % (ref 0–2)
IMM GRANULOCYTES NFR BLD AUTO: 0 % (ref 0–2)
IRON SATN MFR SERPL: 7 % (ref 15–50)
IRON SERPL-MCNC: 42 UG/DL (ref 50–212)
LDLC SERPL CALC-MCNC: 117 MG/DL (ref 0–100)
LYMPHOCYTES # BLD AUTO: 1.27 THOUSANDS/ÂΜL (ref 0.6–4.47)
LYMPHOCYTES # BLD AUTO: 1.45 THOUSANDS/ÂΜL (ref 0.6–4.47)
LYMPHOCYTES NFR BLD AUTO: 21 % (ref 14–44)
LYMPHOCYTES NFR BLD AUTO: 27 % (ref 14–44)
MCH RBC QN AUTO: 22.4 PG (ref 26.8–34.3)
MCH RBC QN AUTO: 22.5 PG (ref 26.8–34.3)
MCHC RBC AUTO-ENTMCNC: 29.2 G/DL (ref 31.4–37.4)
MCHC RBC AUTO-ENTMCNC: 29.8 G/DL (ref 31.4–37.4)
MCV RBC AUTO: 76 FL (ref 82–98)
MCV RBC AUTO: 77 FL (ref 82–98)
MONOCYTES # BLD AUTO: 0.36 THOUSAND/ÂΜL (ref 0.17–1.22)
MONOCYTES # BLD AUTO: 0.46 THOUSAND/ÂΜL (ref 0.17–1.22)
MONOCYTES NFR BLD AUTO: 7 % (ref 4–12)
MONOCYTES NFR BLD AUTO: 8 % (ref 4–12)
NEUTROPHILS # BLD AUTO: 3.26 THOUSANDS/ÂΜL (ref 1.85–7.62)
NEUTROPHILS # BLD AUTO: 3.81 THOUSANDS/ÂΜL (ref 1.85–7.62)
NEUTS SEG NFR BLD AUTO: 59 % (ref 43–75)
NEUTS SEG NFR BLD AUTO: 64 % (ref 43–75)
NONHDLC SERPL-MCNC: 136 MG/DL
NRBC BLD AUTO-RTO: 0 /100 WBCS
NRBC BLD AUTO-RTO: 0 /100 WBCS
PLATELET # BLD AUTO: 304 THOUSANDS/UL (ref 149–390)
PLATELET # BLD AUTO: 316 THOUSANDS/UL (ref 149–390)
PMV BLD AUTO: 9.5 FL (ref 8.9–12.7)
PMV BLD AUTO: 9.6 FL (ref 8.9–12.7)
POTASSIUM SERPL-SCNC: 4.2 MMOL/L (ref 3.5–5.3)
POTASSIUM SERPL-SCNC: 4.8 MMOL/L (ref 3.5–5.3)
PROT SERPL-MCNC: 7 G/DL (ref 6.4–8.4)
RBC # BLD AUTO: 4.79 MILLION/UL (ref 3.81–5.12)
RBC # BLD AUTO: 5.22 MILLION/UL (ref 3.81–5.12)
SODIUM SERPL-SCNC: 137 MMOL/L (ref 135–147)
SODIUM SERPL-SCNC: 138 MMOL/L (ref 135–147)
TIBC SERPL-MCNC: 568.4 UG/DL (ref 250–450)
TRANSFERRIN SERPL-MCNC: 406 MG/DL (ref 203–362)
TRIGL SERPL-MCNC: 96 MG/DL (ref ?–150)
UIBC SERPL-MCNC: 526 UG/DL (ref 155–355)
WBC # BLD AUTO: 5.45 THOUSAND/UL (ref 4.31–10.16)
WBC # BLD AUTO: 5.95 THOUSAND/UL (ref 4.31–10.16)

## 2025-05-31 PROCEDURE — 83540 ASSAY OF IRON: CPT

## 2025-05-31 PROCEDURE — 73564 X-RAY EXAM KNEE 4 OR MORE: CPT

## 2025-05-31 PROCEDURE — 84443 ASSAY THYROID STIM HORMONE: CPT

## 2025-05-31 PROCEDURE — 96365 THER/PROPH/DIAG IV INF INIT: CPT

## 2025-05-31 PROCEDURE — 84484 ASSAY OF TROPONIN QUANT: CPT | Performed by: EMERGENCY MEDICINE

## 2025-05-31 PROCEDURE — 85379 FIBRIN DEGRADATION QUANT: CPT | Performed by: EMERGENCY MEDICINE

## 2025-05-31 PROCEDURE — 83550 IRON BINDING TEST: CPT

## 2025-05-31 PROCEDURE — 85025 COMPLETE CBC W/AUTO DIFF WBC: CPT | Performed by: EMERGENCY MEDICINE

## 2025-05-31 PROCEDURE — 80048 BASIC METABOLIC PNL TOTAL CA: CPT | Performed by: EMERGENCY MEDICINE

## 2025-05-31 PROCEDURE — 85025 COMPLETE CBC W/AUTO DIFF WBC: CPT

## 2025-05-31 PROCEDURE — 93005 ELECTROCARDIOGRAM TRACING: CPT

## 2025-05-31 PROCEDURE — 80053 COMPREHEN METABOLIC PANEL: CPT

## 2025-05-31 PROCEDURE — 80061 LIPID PANEL: CPT

## 2025-05-31 PROCEDURE — 71046 X-RAY EXAM CHEST 2 VIEWS: CPT

## 2025-05-31 PROCEDURE — 99284 EMERGENCY DEPT VISIT MOD MDM: CPT

## 2025-05-31 PROCEDURE — 96375 TX/PRO/DX INJ NEW DRUG ADDON: CPT

## 2025-05-31 PROCEDURE — 99285 EMERGENCY DEPT VISIT HI MDM: CPT | Performed by: EMERGENCY MEDICINE

## 2025-05-31 PROCEDURE — 82550 ASSAY OF CK (CPK): CPT | Performed by: EMERGENCY MEDICINE

## 2025-05-31 PROCEDURE — 82728 ASSAY OF FERRITIN: CPT

## 2025-05-31 PROCEDURE — 36415 COLL VENOUS BLD VENIPUNCTURE: CPT

## 2025-05-31 PROCEDURE — 83036 HEMOGLOBIN GLYCOSYLATED A1C: CPT

## 2025-05-31 RX ORDER — ACETAMINOPHEN 10 MG/ML
1000 INJECTION, SOLUTION INTRAVENOUS ONCE
Status: COMPLETED | OUTPATIENT
Start: 2025-05-31 | End: 2025-05-31

## 2025-05-31 RX ORDER — KETOROLAC TROMETHAMINE 30 MG/ML
15 INJECTION, SOLUTION INTRAMUSCULAR; INTRAVENOUS ONCE
Status: COMPLETED | OUTPATIENT
Start: 2025-05-31 | End: 2025-05-31

## 2025-05-31 RX ORDER — SODIUM CHLORIDE 9 MG/ML
3 INJECTION INTRAVENOUS
Status: DISCONTINUED | OUTPATIENT
Start: 2025-05-31 | End: 2025-05-31 | Stop reason: HOSPADM

## 2025-05-31 RX ORDER — NAPROXEN 500 MG/1
500 TABLET ORAL 2 TIMES DAILY WITH MEALS
Qty: 30 TABLET | Refills: 0 | Status: SHIPPED | OUTPATIENT
Start: 2025-05-31

## 2025-05-31 RX ORDER — METHOCARBAMOL 500 MG/1
500 TABLET, FILM COATED ORAL 3 TIMES DAILY PRN
Qty: 20 TABLET | Refills: 0 | Status: SHIPPED | OUTPATIENT
Start: 2025-05-31

## 2025-05-31 RX ADMIN — ACETAMINOPHEN 1000 MG: 10 INJECTION INTRAVENOUS at 09:12

## 2025-05-31 RX ADMIN — KETOROLAC TROMETHAMINE 15 MG: 30 INJECTION, SOLUTION INTRAMUSCULAR at 09:12

## 2025-05-31 NOTE — ED NOTES
Reviewed discharge instructions at bedside with patient, patient verbalized understanding. No futher questions or concerns at this time. Patient ambulated off unit with steady gait.       Fani Doan RN  05/31/25 [id number]

## 2025-05-31 NOTE — DISCHARGE INSTRUCTIONS
Blood work shows slightly worsening anemia.  Follow-up with your primary care provider regarding this.    Follow-up with orthopedic surgery regarding the pain behind your right knee to be evaluated for Baker's cyst.    Come back for new or worsening symptoms including but not limited to worsening chest pain, shortness of breath, coughing blood, swelling in the leg, worsening pain in the leg.

## 2025-05-31 NOTE — ED PROVIDER NOTES
Time reflects when diagnosis was documented in both MDM as applicable and the Disposition within this note       Time User Action Codes Description Comment    5/31/2025  9:13 AM Fabricio Shook [D64.9] Anemia     5/31/2025  9:53 AM Fabricio Shook [M79.604] Right leg pain     5/31/2025  9:53 AM Fabricio Shook [R07.9] Chest pain           ED Disposition       ED Disposition   Discharge    Condition   Stable    Date/Time   Sat May 31, 2025  9:53 AM    Comment   Miya Camarillo discharge to home/self care.                   Assessment & Plan       Medical Decision Making  Patient with a right popliteal pain for the last week.  No inciting incident.  She has no signs of DVT on exam.  No swelling in the calf or pain on palpation of the calf.  She has hypertonicity of the right posterior thigh muscles laterally.    She also complains of a week of chest pain.  No alleviating or exacerbating factors.  Notes that she has a chronic history of chest pain consistent with this.    On exam there is no swelling in the right lower extremity.  Clear breath sounds.  Oxygen saturation is within normal limits.  No adventitious breath sounds.    Differential including but not limited to ACS, arrhythmia, lecture abnormality, pneumonia, pneumothorax, acute intrathoracic abnormality, PE, DVT.    D-dimer is within normal limits.  Troponin normal.  No reason for delta troponin as symptoms ongoing for 1 week.  Chest x-ray with probable atelectasis in the left lower lobe.  Do not believe this is consistent with pneumonia at this point.    Blood work with anemia slightly worse than previous.  Patient reports that she was previously on iron.  I recommend that she follow-up with her primary care provider soon as possible regarding the chest pain.    Recommend that she follows up with orthopedics regarding the popliteal pain to be assessed for possible Baker's cyst.    Return precautions given.    Problems Addressed:  Anemia:  self-limited or minor problem  Chest pain: acute illness or injury  Right leg pain: acute illness or injury    Amount and/or Complexity of Data Reviewed  Labs: ordered. Decision-making details documented in ED Course.  Radiology: ordered and independent interpretation performed.    Risk  Prescription drug management.        ED Course as of 05/31/25 1058   Sat May 31, 2025   0913 Hemoglobin(!): 10.8  Slightly below baseline     0923 Procedure Note: EKG  Date/Time: 05/31/25 9:23 AM   Interpreted by: Fabricio Shook  Indications / Diagnosis: CP  ECG reviewed by me, the ED Provider: yes   The EKG demonstrates:  Rhythm: normal sinus  Intervals: normal intervals  Axis: normal axis  QRS/Blocks: normal QRS  ST Changes: No acute ST Changes, no STD/VICTORINA.     0933 hs TnI 0hr: 3   0935 D-Dimer, Quant: 0.40       Medications   sodium chloride (PF) 0.9 % injection 3 mL (has no administration in time range)   ketorolac (TORADOL) injection 15 mg (15 mg Intravenous Given 5/31/25 0912)   acetaminophen (Ofirmev) injection 1,000 mg (0 mg Intravenous Stopped 5/31/25 0928)       ED Risk Strat Scores   HEART Risk Score      Flowsheet Row Most Recent Value   Heart Score Risk Calculator    History 0 Filed at: 05/31/2025 0953   ECG 0 Filed at: 05/31/2025 0953   Age 1 Filed at: 05/31/2025 0953   Risk Factors 1 Filed at: 05/31/2025 0953   Troponin 0 Filed at: 05/31/2025 0953   HEART Score 2 Filed at: 05/31/2025 0953          HEART Risk Score      Flowsheet Row Most Recent Value   Heart Score Risk Calculator    History 0 Filed at: 05/31/2025 0953   ECG 0 Filed at: 05/31/2025 0953   Age 1 Filed at: 05/31/2025 0953   Risk Factors 1 Filed at: 05/31/2025 0953   Troponin 0 Filed at: 05/31/2025 0953   HEART Score 2 Filed at: 05/31/2025 0953                      No data recorded        SBIRT 20yo+      Flowsheet Row Most Recent Value   Initial Alcohol Screen: US AUDIT-C     1. How often do you have a drink containing alcohol? 0 Filed at: 05/31/2025  0837   2. How many drinks containing alcohol do you have on a typical day you are drinking?  0 Filed at: 05/31/2025 0837   3a. Male UNDER 65: How often do you have five or more drinks on one occasion? 0 Filed at: 05/31/2025 0837   3b. FEMALE Any Age, or MALE 65+: How often do you have 4 or more drinks on one occassion? 0 Filed at: 05/31/2025 0837   Audit-C Score 0 Filed at: 05/31/2025 0837   SAMUEL: How many times in the past year have you...    Used an illegal drug or used a prescription medication for non-medical reasons? Never Filed at: 05/31/2025 0837              Shad' Criteria for DVT      Flowsheet Row Most Recent Value   Wells' Criteria for DVT    Active cancer Treatment or palliation within 6 months 0 Filed at: 05/31/2025 1057   Bedridden recently >3 days or major surgery within 12 weeks 0 Filed at: 05/31/2025 1057   Calf swelling >3 cm compared to the other leg 0 Filed at: 05/31/2025 1057   Entire leg swollen 0 Filed at: 05/31/2025 1057   Collateral (nonvaricose) superficial veins present 0 Filed at: 05/31/2025 1057   Localized tenderness along the deep venous system 0 Filed at: 05/31/2025 1057   Pitting edema, confined to symptomatic leg 0 Filed at: 05/31/2025 1057   Paralysis, paresis, or recent plaster immobilization of the lower extremity 0 Filed at: 05/31/2025 1057   Previously documented DVT 0 Filed at: 05/31/2025 1057   Alternative diagnosis to DVT as likely or more likely 2 Filed at: 05/31/2025 1057   Shad DVT Critera Score -2 Filed at: 05/31/2025 1057                      History of Present Illness       Chief Complaint   Patient presents with    Leg Pain     Pt states pain behind knee radiating up and down leg for a week, denies redness or heat from the area       Past Medical History[1]   Past Surgical History[2]   Family History[3]   Social History[4]   E-Cigarette/Vaping    E-Cigarette Use Never User       E-Cigarette/Vaping Substances    Nicotine No     THC No     CBD No     Flavoring No      Other No     Unknown No       I have reviewed and agree with the history as documented.     59-year-old female with 1 week of right leg pain.  Located in the popliteal recess.  Radiates up and down.  Worse with movement and palpation.  Can think of no inciting event or trauma.  Denies any swelling.    No recent travel or hospitalization, hemoptysis, estrogen supplementation.  No history of VTE.    Patient also describes chest pressure present for 1 week.  No alleviating exacerbating factors.  No history of ACS.  Has a family history of CAD.  Does not use tobacco.  No history of hypercholesterolemia.  Reports a history of diabetes/ HTN that went away after bariatric surgery.      Leg Pain      Review of Systems   Musculoskeletal:  Positive for myalgias.   All other systems reviewed and are negative.          Objective       ED Triage Vitals   Temperature Pulse Blood Pressure Respirations SpO2 Patient Position - Orthostatic VS   05/31/25 0834 05/31/25 0834 05/31/25 0836 05/31/25 0834 05/31/25 0834 05/31/25 0836   98.1 °F (36.7 °C) 91 134/67 16 99 % Sitting      Temp Source Heart Rate Source BP Location FiO2 (%) Pain Score    05/31/25 0834 05/31/25 0834 05/31/25 0836 -- 05/31/25 0912    Oral Monitor Right arm  7      Vitals      Date and Time Temp Pulse SpO2 Resp BP Pain Score FACES Pain Rating User   05/31/25 1000 -- 73 99 % 16 -- -- -- DU   05/31/25 0915 -- 70 98 % 16 -- 7 -- DU   05/31/25 0912 -- -- -- -- -- 7 -- DU   05/31/25 0836 -- -- -- -- 134/67 -- -- PE   05/31/25 0834 98.1 °F (36.7 °C) 91 99 % 16 -- -- -- DB            Physical Exam  Vitals and nursing note reviewed.   Constitutional:       General: She is not in acute distress.     Appearance: Normal appearance. She is not ill-appearing.   HENT:      Head: Normocephalic and atraumatic.      Right Ear: External ear normal.      Left Ear: External ear normal.      Nose: Nose normal.      Mouth/Throat:      Mouth: Mucous membranes are moist.     Eyes:       General:         Right eye: No discharge.         Left eye: No discharge.      Conjunctiva/sclera: Conjunctivae normal.       Cardiovascular:      Rate and Rhythm: Normal rate and regular rhythm.      Pulses: Normal pulses.      Heart sounds: No murmur heard.     Comments: Normal dorsalis pedis.  Pulmonary:      Effort: Pulmonary effort is normal.      Breath sounds: Normal breath sounds.   Abdominal:      General: Abdomen is flat. There is no distension.      Tenderness: There is no abdominal tenderness.     Musculoskeletal:         General: Tenderness present. Normal range of motion.      Cervical back: Normal range of motion.      Comments: Hypertonicity and pain on palpation of the right posterior lateral thigh.  No swelling of the right lower extremity.  Pain on palpation of the popliteal recess.     Skin:     General: Skin is warm.      Capillary Refill: Capillary refill takes less than 2 seconds.      Findings: No rash.     Neurological:      General: No focal deficit present.      Mental Status: She is alert. Mental status is at baseline.      Comments: Normal sensation and strength in the right lower extremity.   Psychiatric:         Mood and Affect: Mood normal.         Behavior: Behavior normal.         Results Reviewed       Procedure Component Value Units Date/Time    D-dimer, quantitative [[id number]]  (Normal) Collected: 05/31/25 0859    Lab Status: Final result Specimen: Blood from Arm, Left Updated: 05/31/25 0935     D-Dimer, Quant 0.40 ug/ml FEU     Narrative:      In the evaluation for possible pulmonary embolism, in the appropriate (Well's Score of 4 or less) patient, the age adjusted d-dimer cutoff for this patient can be calculated as:    Age x 0.01 (in ug/mL) for Age-adjusted D-dimer exclusion threshold for a patient over 50 years.    HS Troponin 0hr (reflex protocol) [[id number]]  (Normal) Collected: 05/31/25 0859    Lab Status: Final result Specimen: Blood from Arm, Left Updated: 05/31/25 0929      hs TnI 0hr 3 ng/L     HS Troponin I 2hr [[id number]]     Lab Status: No result Specimen: Blood     Basic metabolic panel [[id number]] Collected: 05/31/25 0859    Lab Status: Final result Specimen: Blood from Arm, Left Updated: 05/31/25 0923     Sodium 137 mmol/L      Potassium 4.2 mmol/L      Chloride 105 mmol/L      CO2 25 mmol/L      ANION GAP 7 mmol/L      BUN 15 mg/dL      Creatinine 0.84 mg/dL      Glucose 96 mg/dL      Calcium 8.8 mg/dL      eGFR 76 ml/min/1.73sq m     Narrative:      National Kid[employer] guidelines for Chronic Kidney Disease (CKD):     Stage 1 with normal or high GFR (GFR > 90 mL/min/1.73 square meters)    Stage 2 Mild CKD (GFR = 60-89 mL/min/1.73 square meters)    Stage 3A Moderate CKD (GFR = 45-59 mL/min/1.73 square meters)    Stage 3B Moderate CKD (GFR = 30-44 mL/min/1.73 square meters)    Stage 4 Severe CKD (GFR = 15-29 mL/min/1.73 square meters)    Stage 5 End Stage CKD (GFR <15 mL/min/1.73 square meters)  Note: GFR calculation is accurate only with a steady state creatinine    CK [[id number]]  (Normal) Collected: 05/31/25 0859    Lab Status: Final result Specimen: Blood from Arm, Left Updated: 05/31/25 0923     Total CK 98 U/L     CBC and differential [[id number]]  (Abnormal) Collected: 05/31/25 0859    Lab Status: Final result Specimen: Blood from Arm, Left Updated: 05/31/25 0907     WBC 5.95 Thousand/uL      RBC 4.79 Million/uL      Hemoglobin 10.8 g/dL      Hematocrit 36.3 %      MCV 76 fL      MCH 22.5 pg      MCHC 29.8 g/dL      RDW 17.4 %      MPV 9.6 fL      Platelets 304 Thousands/uL      nRBC 0 /100 WBCs      Segmented % 64 %      Immature Grans % 0 %      Lymphocytes % 21 %      Monocytes % 8 %      Eosinophils Relative 6 %      Basophils Relative 1 %      Absolute Neutrophils 3.81 Thousands/µL      Absolute Immature Grans 0.02 Thousand/uL      Absolute Lymphocytes 1.27 Thousands/µL      Absolute Monocytes 0.46 Thousand/µL      Eosinophils Absolute 0.33  "Thousand/µL      Basophils Absolute 0.06 Thousands/µL             X-ray chest 2 views   ED Interpretation by Fabricio Shook DO (05/31 0942)   Left lower lobe atelectasis.      XR knee 4+ vw right injury   ED Interpretation by Fabricio Shook DO (05/31 0942)   No acute orthopedic finding.          Procedures    ED Medication and Procedure Management   Prior to Admission Medications   Prescriptions Last Dose Informant Patient Reported? Taking?   B-D 3CC LUER-ARACELIS SYR 25GX1/2\" 25G X 1-1/2\" 3 ML MISC More than a month  No No   Sig: USE FOR TORADOL INTRAMUSCULAR INJECTIONS   FLUoxetine (PROzac) 40 MG capsule 5/31/2025 Morning Self Yes Yes   FeroSul 325 (65 Fe) MG tablet Not Taking  Yes No   Patient not taking: Reported on 5/31/2025   Syringe/Needle, Disp, (SYRINGE 3CC/82ZB3-1/4\") 27G X 1-1/4\" 3 ML MISC Not Taking  No No   Sig: Use for Toradol intramuscular injections.   Patient not taking: Reported on 5/31/2025   albuterol (2.5 mg/3 mL) 0.083 % nebulizer solution More than a month  No No   Sig: Take 3 mL (2.5 mg total) by nebulization every 6 (six) hours as needed for wheezing or shortness of breath   albuterol (PROVENTIL HFA,VENTOLIN HFA) 90 mcg/act inhaler More than a month  Yes No   budesonide-formoterol (SYMBICORT) 160-4.5 mcg/act inhaler Not Taking Self Yes No   Sig: Inhale 2 puffs 2 (two) times a day Morning and evening   Patient not taking: Reported on 5/31/2025   clonazePAM (KlonoPIN) 0.5 mg tablet 5/31/2025 Morning Self Yes Yes   Sig: Take 0.5 mg by mouth in the morning.   ipratropium (ATROVENT) 0.02 % nebulizer solution More than a month  No No   Sig: Take 2.5 mL (0.5 mg total) by nebulization 3 (three) times a day   lamoTRIgine (LaMICtal) 25 mg tablet Not Taking  Yes No   Sig: Take 50 mg by mouth 2 (two) times a day   Patient not taking: Reported on 5/31/2025   levalbuterol (XOPENEX) 1.25 mg/3 mL nebulizer solution Not Taking  No No   Sig: Take 3 mL (1.25 mg total) by nebulization 3 (three) times a day " "  Patient not taking: Reported on 5/31/2025   pantoprazole (PROTONIX) 40 mg tablet Not Taking  No No   Sig: Take 1 tablet (40 mg total) by mouth 2 (two) times a day   Patient not taking: Reported on 5/31/2025      Facility-Administered Medications: None     Discharge Medication List as of 5/31/2025  9:55 AM        START taking these medications    Details   methocarbamol (ROBAXIN) 500 mg tablet Take 1 tablet (500 mg total) by mouth 3 (three) times a day as needed for muscle spasms, Starting Sat 5/31/2025, Normal      naproxen (Naprosyn) 500 mg tablet Take 1 tablet (500 mg total) by mouth 2 (two) times a day with meals, Starting Sat 5/31/2025, Normal           CONTINUE these medications which have NOT CHANGED    Details   clonazePAM (KlonoPIN) 0.5 mg tablet Take 0.5 mg by mouth in the morning., Historical Med      FLUoxetine (PROzac) 40 MG capsule Historical Med      albuterol (2.5 mg/3 mL) 0.083 % nebulizer solution Take 3 mL (2.5 mg total) by nebulization every 6 (six) hours as needed for wheezing or shortness of breath, Starting Wed 1/29/2025, Normal      albuterol (PROVENTIL HFA,VENTOLIN HFA) 90 mcg/act inhaler Starting Wed 5/17/2023, Historical Med      B-D 3CC LUER-ARACELIS SYR 25GX1/2\" 25G X 1-1/2\" 3 ML MISC USE FOR TORADOL INTRAMUSCULAR INJECTIONS, Normal      budesonide-formoterol (SYMBICORT) 160-4.5 mcg/act inhaler Inhale 2 puffs 2 (two) times a day Morning and evening, Starting Sun 11/7/2021, Historical Med      FeroSul 325 (65 Fe) MG tablet Starting Wed 3/1/2023, Historical Med      ipratropium (ATROVENT) 0.02 % nebulizer solution Take 2.5 mL (0.5 mg total) by nebulization 3 (three) times a day, Starting Wed 12/13/2023, Normal      lamoTRIgine (LaMICtal) 25 mg tablet Take 50 mg by mouth 2 (two) times a day, Starting Fri 2/17/2023, Historical Med      levalbuterol (XOPENEX) 1.25 mg/3 mL nebulizer solution Take 3 mL (1.25 mg total) by nebulization 3 (three) times a day, Starting Wed 12/13/2023, Normal    " "  pantoprazole (PROTONIX) 40 mg tablet Take 1 tablet (40 mg total) by mouth 2 (two) times a day, Starting Fri 2/17/2023, Normal      Syringe/Needle, Disp, (SYRINGE 3CC/76XV1-1/4\") 27G X 1-1/4\" 3 ML MISC Use for Toradol intramuscular injections., Normal           No discharge procedures on file.  ED SEPSIS DOCUMENTATION   Time reflects when diagnosis was documented in both MDM as applicable and the Disposition within this note       Time User Action Codes Description Comment    5/31/2025  9:13 AM Fabricio Shook [D64.9] Anemia     5/31/2025  9:53 AM Fabricio Shook [M79.604] Right leg pain     5/31/2025  9:53 AM Fabricio Shook [R07.9] Chest pain                    [1]   Past Medical History:  Diagnosis Date    Anxiety     Asthma     Depression     Hypertension     Migraine     Rapid heart rate    [2]   Past Surgical History:  Procedure Laterality Date    BARIATRIC SURGERY      CHOLECYSTECTOMY      GASTRIC BYPASS  2018    also had repair of perforated bowel afterwards    HERNIA REPAIR      HYSTERECTOMY      LUNG SURGERY      OK REPAIR FIRST ABDOMINAL WALL HERNIA N/A 01/28/2022    Procedure: OPEN INCISIONAL HERNIA REPAIR WITH MESH;  Surgeon: Bill Baxter MD;  Location: [facility];  Service: General    UPPER GASTROINTESTINAL ENDOSCOPY     [3]   Family History  Problem Relation Name Age of Onset    Cancer Mother          liver    Diabetes Father      Lung cancer Father      No Known Problems Sister      No Known Problems Sister      No Known Problems Sister      No Known Problems Daughter      No Known Problems Maternal Grandmother      No Known Problems Maternal Grandfather      No Known Problems Paternal Grandmother      No Known Problems Paternal Grandfather      No Known Problems Son      No Known Problems Maternal Aunt      No Known Problems Maternal Aunt      No Known Problems Paternal Aunt     [4]   Social History  Tobacco Use    Smoking status: Former     Current packs/day: 0.00     Types: " Cigarettes     Quit date:      Years since quittin.4    Smokeless tobacco: Never   Vaping Use    Vaping status: Never Used   Substance Use Topics    Alcohol use: Yes     Comment: Occ socially    Drug use: Not Currently     Types: Cocaine     Comment: last cocain use         Fabricio Shook DO  25 1056

## 2025-06-02 LAB — TSH SERPL DL<=0.05 MIU/L-ACNC: 2.54 UIU/ML (ref 0.45–4.5)

## 2025-06-04 LAB
ATRIAL RATE: 70 BPM
P AXIS: 65 DEGREES
PR INTERVAL: 150 MS
QRS AXIS: 33 DEGREES
QRSD INTERVAL: 84 MS
QT INTERVAL: 414 MS
QTC INTERVAL: 447 MS
T WAVE AXIS: 66 DEGREES
VENTRICULAR RATE: 70 BPM

## 2025-06-04 PROCEDURE — 93010 ELECTROCARDIOGRAM REPORT: CPT | Performed by: INTERNAL MEDICINE

## 2025-06-18 ENCOUNTER — HOSPITAL ENCOUNTER (OUTPATIENT)
Dept: MAMMOGRAPHY | Facility: CLINIC | Age: 60
Discharge: HOME | End: 2025-06-18
Attending: FAMILY MEDICINE
Payer: COMMERCIAL

## 2025-06-18 ENCOUNTER — HOSPITAL ENCOUNTER (OUTPATIENT)
Dept: ULTRASOUND IMAGING | Facility: CLINIC | Age: 60
Discharge: HOME | End: 2025-06-18
Attending: FAMILY MEDICINE
Payer: COMMERCIAL

## 2025-06-18 VITALS — HEIGHT: 63 IN | BODY MASS INDEX: 30.12 KG/M2 | WEIGHT: 170 LBS

## 2025-06-18 DIAGNOSIS — R92.8 ABNORMAL FINDING ON BREAST IMAGING: ICD-10-CM

## 2025-06-18 PROCEDURE — G0279 TOMOSYNTHESIS, MAMMO: HCPCS

## 2025-06-18 PROCEDURE — 77066 DX MAMMO INCL CAD BI: CPT

## 2025-06-18 PROCEDURE — 76642 ULTRASOUND BREAST LIMITED: CPT

## 2025-07-10 ENCOUNTER — HOSPITAL ENCOUNTER (OUTPATIENT)
Dept: RADIOLOGY | Facility: HOSPITAL | Age: 60
End: 2025-07-10
Payer: COMMERCIAL

## 2025-07-10 DIAGNOSIS — R91.1 COIN LESION: ICD-10-CM

## 2025-07-10 PROCEDURE — 71046 X-RAY EXAM CHEST 2 VIEWS: CPT

## 2025-08-01 ENCOUNTER — HOSPITAL ENCOUNTER (EMERGENCY)
Facility: HOSPITAL | Age: 60
Discharge: HOME | End: 2025-08-01
Attending: EMERGENCY MEDICINE | Admitting: EMERGENCY MEDICINE
Payer: COMMERCIAL

## 2025-08-01 ENCOUNTER — APPOINTMENT (EMERGENCY)
Dept: RADIOLOGY | Facility: HOSPITAL | Age: 60
End: 2025-08-01
Payer: COMMERCIAL

## 2025-08-01 ENCOUNTER — APPOINTMENT (EMERGENCY)
Dept: VASCULAR ULTRASOUND | Facility: HOSPITAL | Age: 60
End: 2025-08-01
Payer: COMMERCIAL

## 2025-08-01 VITALS
DIASTOLIC BLOOD PRESSURE: 69 MMHG | RESPIRATION RATE: 18 BRPM | HEART RATE: 95 BPM | SYSTOLIC BLOOD PRESSURE: 155 MMHG | OXYGEN SATURATION: 96 % | TEMPERATURE: 98.6 F

## 2025-08-01 DIAGNOSIS — M71.21 BAKER CYST, RIGHT: Primary | ICD-10-CM

## 2025-08-01 LAB
ALBUMIN SERPL BCG-MCNC: 4 G/DL (ref 3.5–5)
ALP SERPL-CCNC: 91 U/L (ref 34–104)
ALT SERPL W P-5'-P-CCNC: 16 U/L (ref 7–52)
ANION GAP SERPL CALCULATED.3IONS-SCNC: 9 MMOL/L (ref 4–13)
AST SERPL W P-5'-P-CCNC: 12 U/L (ref 13–39)
BASOPHILS # BLD AUTO: 0.04 THOUSANDS/ÂΜL (ref 0–0.1)
BASOPHILS NFR BLD AUTO: 1 % (ref 0–1)
BILIRUB SERPL-MCNC: 0.38 MG/DL (ref 0.2–1)
BUN SERPL-MCNC: 15 MG/DL (ref 5–25)
CALCIUM SERPL-MCNC: 8.7 MG/DL (ref 8.4–10.2)
CHLORIDE SERPL-SCNC: 105 MMOL/L (ref 96–108)
CO2 SERPL-SCNC: 25 MMOL/L (ref 21–32)
CREAT SERPL-MCNC: 0.99 MG/DL (ref 0.6–1.3)
CRP SERPL QL: 3.3 MG/L
EOSINOPHIL # BLD AUTO: 0.2 THOUSAND/ÂΜL (ref 0–0.61)
EOSINOPHIL NFR BLD AUTO: 3 % (ref 0–6)
ERYTHROCYTE [DISTWIDTH] IN BLOOD BY AUTOMATED COUNT: 17.1 % (ref 11.6–15.1)
ERYTHROCYTE [SEDIMENTATION RATE] IN BLOOD: 7 MM/HOUR (ref 0–30)
FLUAV AG UPPER RESP QL IA.RAPID: NEGATIVE
FLUBV AG UPPER RESP QL IA.RAPID: NEGATIVE
GFR SERPL CREATININE-BSD FRML MDRD: 63 ML/MIN/1.73SQ M
GLUCOSE SERPL-MCNC: 137 MG/DL (ref 65–140)
HCT VFR BLD AUTO: 36.6 % (ref 34.8–46.1)
HGB BLD-MCNC: 11.1 G/DL (ref 11.5–15.4)
IMM GRANULOCYTES # BLD AUTO: 0.06 THOUSAND/UL (ref 0–0.2)
IMM GRANULOCYTES NFR BLD AUTO: 1 % (ref 0–2)
LACTATE SERPL-SCNC: 1.7 MMOL/L (ref 0.5–2)
LYMPHOCYTES # BLD AUTO: 1.43 THOUSANDS/ÂΜL (ref 0.6–4.47)
LYMPHOCYTES NFR BLD AUTO: 18 % (ref 14–44)
MCH RBC QN AUTO: 22.8 PG (ref 26.8–34.3)
MCHC RBC AUTO-ENTMCNC: 30.3 G/DL (ref 31.4–37.4)
MCV RBC AUTO: 75 FL (ref 82–98)
MONOCYTES # BLD AUTO: 0.6 THOUSAND/ÂΜL (ref 0.17–1.22)
MONOCYTES NFR BLD AUTO: 8 % (ref 4–12)
NEUTROPHILS # BLD AUTO: 5.44 THOUSANDS/ÂΜL (ref 1.85–7.62)
NEUTS SEG NFR BLD AUTO: 69 % (ref 43–75)
NRBC BLD AUTO-RTO: 0 /100 WBCS
PLATELET # BLD AUTO: 340 THOUSANDS/UL (ref 149–390)
PMV BLD AUTO: 9.3 FL (ref 8.9–12.7)
POTASSIUM SERPL-SCNC: 3.6 MMOL/L (ref 3.5–5.3)
PROCALCITONIN SERPL-MCNC: <0.05 NG/ML
PROT SERPL-MCNC: 6.4 G/DL (ref 6.4–8.4)
RBC # BLD AUTO: 4.87 MILLION/UL (ref 3.81–5.12)
SARS-COV+SARS-COV-2 AG RESP QL IA.RAPID: NEGATIVE
SODIUM SERPL-SCNC: 139 MMOL/L (ref 135–147)
WBC # BLD AUTO: 7.77 THOUSAND/UL (ref 4.31–10.16)

## 2025-08-01 PROCEDURE — 87811 SARS-COV-2 COVID19 W/OPTIC: CPT | Performed by: PHYSICIAN ASSISTANT

## 2025-08-01 PROCEDURE — 86140 C-REACTIVE PROTEIN: CPT | Performed by: PHYSICIAN ASSISTANT

## 2025-08-01 PROCEDURE — 93971 EXTREMITY STUDY: CPT

## 2025-08-01 PROCEDURE — 85025 COMPLETE CBC W/AUTO DIFF WBC: CPT | Performed by: PHYSICIAN ASSISTANT

## 2025-08-01 PROCEDURE — 87804 INFLUENZA ASSAY W/OPTIC: CPT | Performed by: PHYSICIAN ASSISTANT

## 2025-08-01 PROCEDURE — 93971 EXTREMITY STUDY: CPT | Performed by: SURGERY

## 2025-08-01 PROCEDURE — 36415 COLL VENOUS BLD VENIPUNCTURE: CPT | Performed by: PHYSICIAN ASSISTANT

## 2025-08-01 PROCEDURE — 80053 COMPREHEN METABOLIC PANEL: CPT | Performed by: PHYSICIAN ASSISTANT

## 2025-08-01 PROCEDURE — 96374 THER/PROPH/DIAG INJ IV PUSH: CPT

## 2025-08-01 PROCEDURE — 83605 ASSAY OF LACTIC ACID: CPT | Performed by: PHYSICIAN ASSISTANT

## 2025-08-01 PROCEDURE — 84145 PROCALCITONIN (PCT): CPT | Performed by: PHYSICIAN ASSISTANT

## 2025-08-01 PROCEDURE — 99283 EMERGENCY DEPT VISIT LOW MDM: CPT

## 2025-08-01 PROCEDURE — 96375 TX/PRO/DX INJ NEW DRUG ADDON: CPT

## 2025-08-01 PROCEDURE — 85652 RBC SED RATE AUTOMATED: CPT | Performed by: PHYSICIAN ASSISTANT

## 2025-08-01 PROCEDURE — 73564 X-RAY EXAM KNEE 4 OR MORE: CPT

## 2025-08-01 PROCEDURE — 99285 EMERGENCY DEPT VISIT HI MDM: CPT | Performed by: PHYSICIAN ASSISTANT

## 2025-08-01 RX ORDER — MELOXICAM 15 MG/1
15 TABLET ORAL DAILY
Qty: 10 TABLET | Refills: 0 | Status: SHIPPED | OUTPATIENT
Start: 2025-08-01

## 2025-08-01 RX ORDER — ACETAMINOPHEN 10 MG/ML
1000 INJECTION, SOLUTION INTRAVENOUS ONCE
Status: COMPLETED | OUTPATIENT
Start: 2025-08-01 | End: 2025-08-01

## 2025-08-01 RX ORDER — KETOROLAC TROMETHAMINE 30 MG/ML
15 INJECTION, SOLUTION INTRAMUSCULAR; INTRAVENOUS ONCE
Status: COMPLETED | OUTPATIENT
Start: 2025-08-01 | End: 2025-08-01

## 2025-08-01 RX ADMIN — ACETAMINOPHEN 1000 MG: 10 INJECTION INTRAVENOUS at 15:41

## 2025-08-01 RX ADMIN — KETOROLAC TROMETHAMINE 15 MG: 30 INJECTION, SOLUTION INTRAMUSCULAR at 16:30

## 2025-08-08 ENCOUNTER — APPOINTMENT (OUTPATIENT)
Dept: RADIOLOGY | Facility: AMBULARY SURGERY CENTER | Age: 60
End: 2025-08-08
Attending: ORTHOPAEDIC SURGERY
Payer: COMMERCIAL

## 2025-08-08 ENCOUNTER — OFFICE VISIT (OUTPATIENT)
Dept: OBGYN CLINIC | Facility: CLINIC | Age: 60
End: 2025-08-08
Payer: COMMERCIAL

## 2025-08-08 VITALS — WEIGHT: 205 LBS | BODY MASS INDEX: 36.32 KG/M2 | HEIGHT: 63 IN

## 2025-08-08 DIAGNOSIS — M25.561 CHRONIC PAIN OF RIGHT KNEE: Primary | ICD-10-CM

## 2025-08-08 DIAGNOSIS — M17.11 PRIMARY OSTEOARTHRITIS OF RIGHT KNEE: ICD-10-CM

## 2025-08-08 DIAGNOSIS — M71.21 BAKER CYST, RIGHT: ICD-10-CM

## 2025-08-08 DIAGNOSIS — G89.29 CHRONIC PAIN OF RIGHT KNEE: Primary | ICD-10-CM

## 2025-08-08 PROCEDURE — 99204 OFFICE O/P NEW MOD 45 MIN: CPT | Performed by: ORTHOPAEDIC SURGERY

## 2025-08-08 PROCEDURE — 20610 DRAIN/INJ JOINT/BURSA W/O US: CPT | Performed by: ORTHOPAEDIC SURGERY

## 2025-08-08 RX ORDER — TRIAMCINOLONE ACETONIDE 40 MG/ML
80 INJECTION, SUSPENSION INTRA-ARTICULAR; INTRAMUSCULAR
Status: COMPLETED | OUTPATIENT
Start: 2025-08-08 | End: 2025-08-08

## 2025-08-08 RX ORDER — BUPIVACAINE HYDROCHLORIDE 2.5 MG/ML
2 INJECTION, SOLUTION INFILTRATION; PERINEURAL
Status: COMPLETED | OUTPATIENT
Start: 2025-08-08 | End: 2025-08-08

## 2025-08-08 RX ADMIN — BUPIVACAINE HYDROCHLORIDE 2 ML: 2.5 INJECTION, SOLUTION INFILTRATION; PERINEURAL at 09:15

## 2025-08-08 RX ADMIN — TRIAMCINOLONE ACETONIDE 80 MG: 40 INJECTION, SUSPENSION INTRA-ARTICULAR; INTRAMUSCULAR at 09:15

## (undated) DEVICE — VIAL DECANTER

## (undated) DEVICE — GLOVE PI ULTRA TOUCH SZ.7.5

## (undated) DEVICE — SUT VICRYL 2-0 SH 27 IN UNDYED J417H

## (undated) DEVICE — SPONGE LAP 18 X 18 IN STRL RFD

## (undated) DEVICE — PLUMEPEN PRO 10FT

## (undated) DEVICE — ADHESIVE SKIN HIGH VISCOSITY EXOFIN 1ML

## (undated) DEVICE — SUT VICRYL 0 18 IN J906G

## (undated) DEVICE — TOWEL SURG XR DETECT GREEN STRL RFD

## (undated) DEVICE — GLOVE PI ULTRA TOUCH SZ.8.0

## (undated) DEVICE — SUT ETHIBOND 1 CT-1 30 IN X425H

## (undated) DEVICE — INTENDED FOR TISSUE SEPARATION, AND OTHER PROCEDURES THAT REQUIRE A SHARP SURGICAL BLADE TO PUNCTURE OR CUT.: Brand: BARD-PARKER SAFETY BLADES SIZE 15, STERILE

## (undated) DEVICE — GLOVE INDICATOR PI UNDERGLOVE SZ 8 BLUE

## (undated) DEVICE — SUT MONOCRYL 4-0 PS-2 18 IN Y496G

## (undated) DEVICE — NEEDLE 21 G X 1 1/2 SAFETY

## (undated) DEVICE — CHLORAPREP HI-LITE 26ML ORANGE

## (undated) DEVICE — BETHLEHEM UNIVERSAL MINOR GEN: Brand: CARDINAL HEALTH